# Patient Record
Sex: MALE | Race: WHITE | NOT HISPANIC OR LATINO | URBAN - METROPOLITAN AREA
[De-identification: names, ages, dates, MRNs, and addresses within clinical notes are randomized per-mention and may not be internally consistent; named-entity substitution may affect disease eponyms.]

---

## 2018-11-12 ENCOUNTER — OUTPATIENT (OUTPATIENT)
Dept: OUTPATIENT SERVICES | Facility: HOSPITAL | Age: 58
LOS: 1 days | Discharge: HOME | End: 2018-11-12

## 2018-11-12 DIAGNOSIS — I20.9 ANGINA PECTORIS, UNSPECIFIED: ICD-10-CM

## 2023-08-29 ENCOUNTER — EMERGENCY (EMERGENCY)
Facility: HOSPITAL | Age: 63
LOS: 0 days | Discharge: HOME | End: 2023-08-29
Attending: EMERGENCY MEDICINE
Payer: COMMERCIAL

## 2023-08-29 VITALS
RESPIRATION RATE: 20 BRPM | SYSTOLIC BLOOD PRESSURE: 120 MMHG | OXYGEN SATURATION: 99 % | TEMPERATURE: 99 F | HEART RATE: 79 BPM | DIASTOLIC BLOOD PRESSURE: 62 MMHG

## 2023-08-29 VITALS
TEMPERATURE: 98 F | RESPIRATION RATE: 20 BRPM | SYSTOLIC BLOOD PRESSURE: 124 MMHG | HEART RATE: 78 BPM | OXYGEN SATURATION: 99 % | DIASTOLIC BLOOD PRESSURE: 70 MMHG

## 2023-08-29 DIAGNOSIS — F17.210 NICOTINE DEPENDENCE, CIGARETTES, UNCOMPLICATED: ICD-10-CM

## 2023-08-29 DIAGNOSIS — R55 SYNCOPE AND COLLAPSE: ICD-10-CM

## 2023-08-29 DIAGNOSIS — I45.10 UNSPECIFIED RIGHT BUNDLE-BRANCH BLOCK: ICD-10-CM

## 2023-08-29 DIAGNOSIS — J44.9 CHRONIC OBSTRUCTIVE PULMONARY DISEASE, UNSPECIFIED: ICD-10-CM

## 2023-08-29 LAB
ALBUMIN SERPL ELPH-MCNC: 4.2 G/DL — SIGNIFICANT CHANGE UP (ref 3.5–5.2)
ALP SERPL-CCNC: 107 U/L — SIGNIFICANT CHANGE UP (ref 30–115)
ALT FLD-CCNC: 14 U/L — SIGNIFICANT CHANGE UP (ref 0–41)
ANION GAP SERPL CALC-SCNC: 14 MMOL/L — SIGNIFICANT CHANGE UP (ref 7–14)
APTT BLD: 33 SEC — SIGNIFICANT CHANGE UP (ref 27–39.2)
AST SERPL-CCNC: 29 U/L — SIGNIFICANT CHANGE UP (ref 0–41)
BASOPHILS # BLD AUTO: 0.04 K/UL — SIGNIFICANT CHANGE UP (ref 0–0.2)
BASOPHILS NFR BLD AUTO: 0.3 % — SIGNIFICANT CHANGE UP (ref 0–1)
BILIRUB SERPL-MCNC: 0.3 MG/DL — SIGNIFICANT CHANGE UP (ref 0.2–1.2)
BUN SERPL-MCNC: 22 MG/DL — HIGH (ref 10–20)
CALCIUM SERPL-MCNC: 9.8 MG/DL — SIGNIFICANT CHANGE UP (ref 8.4–10.5)
CHLORIDE SERPL-SCNC: 102 MMOL/L — SIGNIFICANT CHANGE UP (ref 98–110)
CO2 SERPL-SCNC: 22 MMOL/L — SIGNIFICANT CHANGE UP (ref 17–32)
CREAT SERPL-MCNC: 0.9 MG/DL — SIGNIFICANT CHANGE UP (ref 0.7–1.5)
EGFR: 96 ML/MIN/1.73M2 — SIGNIFICANT CHANGE UP
EOSINOPHIL # BLD AUTO: 0.07 K/UL — SIGNIFICANT CHANGE UP (ref 0–0.7)
EOSINOPHIL NFR BLD AUTO: 0.6 % — SIGNIFICANT CHANGE UP (ref 0–8)
GLUCOSE SERPL-MCNC: 89 MG/DL — SIGNIFICANT CHANGE UP (ref 70–99)
HCT VFR BLD CALC: 26.7 % — LOW (ref 42–52)
HGB BLD-MCNC: 8.4 G/DL — LOW (ref 14–18)
IMM GRANULOCYTES NFR BLD AUTO: 0.7 % — HIGH (ref 0.1–0.3)
INR BLD: 1.03 RATIO — SIGNIFICANT CHANGE UP (ref 0.65–1.3)
LYMPHOCYTES # BLD AUTO: 1 K/UL — LOW (ref 1.2–3.4)
LYMPHOCYTES # BLD AUTO: 8.1 % — LOW (ref 20.5–51.1)
MAGNESIUM SERPL-MCNC: 2 MG/DL — SIGNIFICANT CHANGE UP (ref 1.8–2.4)
MCHC RBC-ENTMCNC: 28.1 PG — SIGNIFICANT CHANGE UP (ref 27–31)
MCHC RBC-ENTMCNC: 31.5 G/DL — LOW (ref 32–37)
MCV RBC AUTO: 89.3 FL — SIGNIFICANT CHANGE UP (ref 80–94)
MONOCYTES # BLD AUTO: 0.58 K/UL — SIGNIFICANT CHANGE UP (ref 0.1–0.6)
MONOCYTES NFR BLD AUTO: 4.7 % — SIGNIFICANT CHANGE UP (ref 1.7–9.3)
NEUTROPHILS # BLD AUTO: 10.57 K/UL — HIGH (ref 1.4–6.5)
NEUTROPHILS NFR BLD AUTO: 85.6 % — HIGH (ref 42.2–75.2)
NRBC # BLD: 0 /100 WBCS — SIGNIFICANT CHANGE UP (ref 0–0)
PHOSPHATE SERPL-MCNC: 3 MG/DL — SIGNIFICANT CHANGE UP (ref 2.1–4.9)
PLATELET # BLD AUTO: 445 K/UL — HIGH (ref 130–400)
PMV BLD: 9.8 FL — SIGNIFICANT CHANGE UP (ref 7.4–10.4)
POTASSIUM SERPL-MCNC: 4.7 MMOL/L — SIGNIFICANT CHANGE UP (ref 3.5–5)
POTASSIUM SERPL-SCNC: 4.7 MMOL/L — SIGNIFICANT CHANGE UP (ref 3.5–5)
PROT SERPL-MCNC: 7.4 G/DL — SIGNIFICANT CHANGE UP (ref 6–8)
PROTHROM AB SERPL-ACNC: 11.7 SEC — SIGNIFICANT CHANGE UP (ref 9.95–12.87)
RBC # BLD: 2.99 M/UL — LOW (ref 4.7–6.1)
RBC # FLD: 14.9 % — HIGH (ref 11.5–14.5)
SODIUM SERPL-SCNC: 138 MMOL/L — SIGNIFICANT CHANGE UP (ref 135–146)
WBC # BLD: 12.35 K/UL — HIGH (ref 4.8–10.8)
WBC # FLD AUTO: 12.35 K/UL — HIGH (ref 4.8–10.8)

## 2023-08-29 PROCEDURE — 80053 COMPREHEN METABOLIC PANEL: CPT

## 2023-08-29 PROCEDURE — 93308 TTE F-UP OR LMTD: CPT

## 2023-08-29 PROCEDURE — 99285 EMERGENCY DEPT VISIT HI MDM: CPT | Mod: 25

## 2023-08-29 PROCEDURE — 93010 ELECTROCARDIOGRAM REPORT: CPT

## 2023-08-29 PROCEDURE — 36415 COLL VENOUS BLD VENIPUNCTURE: CPT

## 2023-08-29 PROCEDURE — 71046 X-RAY EXAM CHEST 2 VIEWS: CPT

## 2023-08-29 PROCEDURE — 99285 EMERGENCY DEPT VISIT HI MDM: CPT

## 2023-08-29 PROCEDURE — 93005 ELECTROCARDIOGRAM TRACING: CPT

## 2023-08-29 PROCEDURE — 85610 PROTHROMBIN TIME: CPT

## 2023-08-29 PROCEDURE — 71046 X-RAY EXAM CHEST 2 VIEWS: CPT | Mod: 26

## 2023-08-29 PROCEDURE — 85730 THROMBOPLASTIN TIME PARTIAL: CPT

## 2023-08-29 PROCEDURE — 93308 TTE F-UP OR LMTD: CPT | Mod: 26

## 2023-08-29 PROCEDURE — 83735 ASSAY OF MAGNESIUM: CPT

## 2023-08-29 PROCEDURE — 85025 COMPLETE CBC W/AUTO DIFF WBC: CPT

## 2023-08-29 PROCEDURE — 84100 ASSAY OF PHOSPHORUS: CPT

## 2023-08-29 RX ORDER — SODIUM CHLORIDE 9 MG/ML
1000 INJECTION INTRAMUSCULAR; INTRAVENOUS; SUBCUTANEOUS ONCE
Refills: 0 | Status: COMPLETED | OUTPATIENT
Start: 2023-08-29 | End: 2023-08-29

## 2023-08-29 RX ADMIN — SODIUM CHLORIDE 2000 MILLILITER(S): 9 INJECTION INTRAMUSCULAR; INTRAVENOUS; SUBCUTANEOUS at 15:08

## 2023-08-29 NOTE — ED PROVIDER NOTE - PATIENT PORTAL LINK FT
You can access the FollowMyHealth Patient Portal offered by [facility] by registering at the following website: [email or web address]. By joining [employer]’s FollowMyHealth portal, you will also be able to view your health information using other applications (apps) compatible with our system.

## 2023-08-29 NOTE — ED ADULT NURSE NOTE - NSFALLUNIVINTERV_ED_ALL_ED
Bed/Stretcher in lowest position, wheels locked, appropriate side rails in place/Call bell, personal items and telephone in reach/Instruct patient to call for assistance before getting out of bed/chair/stretcher/Non-slip footwear applied when patient is off stretcher/[place] to call system/Physically safe environment - no spills, clutter or unnecessary equipment/Purposeful proactive rounding/Room/bathroom lighting operational, light cord in reach

## 2023-08-29 NOTE — ED PROVIDER NOTE - NSFOLLOWUPINSTRUCTIONS_ED_ALL_ED_FT
Please follow-up with PCP in 1 to 3 days. Return precautions explained in full to patient/family.    Syncope    Syncope is when you temporarily lose consciousness, also called fainting or passing out. It is caused by a sudden decrease in blood flow to the brain. Even though most causes of syncope are not dangerous, syncope can possibly be a sign of a serious medical problem. Signs that you may be about to faint include feeling dizzy, lightheaded, nausea, visual changes, or cold/clammy skin. Do not drive, operate heavy machinery, or play sports until your health care provider says it is okay.    SEEK IMMEDIATE MEDICAL CARE IF YOU HAVE ANY OF THE FOLLOWING SYMPTOMS: severe headache, pain in your chest/abdomen/back, bleeding from your mouth or rectum, palpitations, shortness of breath, pain with breathing, seizure, confusion, or trouble walking. never used

## 2023-08-29 NOTE — ED PROVIDER NOTE - CLINICAL SUMMARY MEDICAL DECISION MAKING FREE TEXT BOX
Labs and imaging reassuring.  VSS.  Patient prefers to follow up as an outpatient.  Strict return instructions discussed.

## 2023-08-29 NOTE — ED PROVIDER NOTE - PHYSICAL EXAMINATION
CONSTITUTIONAL: NAD  SKIN: Warm dry  HEAD: NCAT  EYES: NL inspection, normal conjunctivae  ENT: MMM  NECK: Supple; non tender.  CARD: RRR  RESP: CTAB  ABD: S/NT no R/G  EXT: no pedal edema  NEURO: Grossly unremarkable  PSYCH: Cooperative, appropriate.

## 2023-08-29 NOTE — ED PROVIDER NOTE - OBJECTIVE STATEMENT
63-year-old male past medical history of 80-pack-year smoking and recently diagnosed throat cancer which is impinging on his left IJ, chronic iron anemia, COPD coming in with complaints of syncope.  Patient reports that he was at his iron infusion clinic and was about to receive an infusion when which he syncopized for couple minutes.  Describes some prodromal symptoms of cold nausea.  Follows with Dr. العلي at [facility] as ENT, and Dr. Aviles at his hematologist.

## 2023-08-31 ENCOUNTER — EMERGENCY (EMERGENCY)
Facility: HOSPITAL | Age: 63
LOS: 0 days | Discharge: HOME | End: 2023-08-31
Attending: STUDENT IN AN ORGANIZED HEALTH CARE EDUCATION/TRAINING PROGRAM
Payer: COMMERCIAL

## 2023-08-31 VITALS
DIASTOLIC BLOOD PRESSURE: 83 MMHG | RESPIRATION RATE: 16 BRPM | HEART RATE: 99 BPM | WEIGHT: 151.02 LBS | TEMPERATURE: 100 F | SYSTOLIC BLOOD PRESSURE: 183 MMHG | OXYGEN SATURATION: 99 %

## 2023-08-31 VITALS
DIASTOLIC BLOOD PRESSURE: 70 MMHG | RESPIRATION RATE: 18 BRPM | SYSTOLIC BLOOD PRESSURE: 147 MMHG | TEMPERATURE: 100 F | OXYGEN SATURATION: 100 % | HEART RATE: 82 BPM

## 2023-08-31 DIAGNOSIS — Z86.2 PERSONAL HISTORY OF DISEASES OF THE BLOOD AND BLOOD-FORMING ORGANS AND CERTAIN DISORDERS INVOLVING THE IMMUNE MECHANISM: ICD-10-CM

## 2023-08-31 DIAGNOSIS — R07.2 PRECORDIAL PAIN: ICD-10-CM

## 2023-08-31 DIAGNOSIS — Z85.850 PERSONAL HISTORY OF MALIGNANT NEOPLASM OF THYROID: ICD-10-CM

## 2023-08-31 DIAGNOSIS — F17.210 NICOTINE DEPENDENCE, CIGARETTES, UNCOMPLICATED: ICD-10-CM

## 2023-08-31 DIAGNOSIS — C14.0 MALIGNANT NEOPLASM OF PHARYNX, UNSPECIFIED: ICD-10-CM

## 2023-08-31 DIAGNOSIS — I10 ESSENTIAL (PRIMARY) HYPERTENSION: ICD-10-CM

## 2023-08-31 DIAGNOSIS — J44.9 CHRONIC OBSTRUCTIVE PULMONARY DISEASE, UNSPECIFIED: ICD-10-CM

## 2023-08-31 LAB
ALBUMIN SERPL ELPH-MCNC: 4.1 G/DL — SIGNIFICANT CHANGE UP (ref 3.5–5.2)
ALP SERPL-CCNC: 94 U/L — SIGNIFICANT CHANGE UP (ref 30–115)
ALT FLD-CCNC: 13 U/L — SIGNIFICANT CHANGE UP (ref 0–41)
ANION GAP SERPL CALC-SCNC: 13 MMOL/L — SIGNIFICANT CHANGE UP (ref 7–14)
AST SERPL-CCNC: 17 U/L — SIGNIFICANT CHANGE UP (ref 0–41)
BASOPHILS # BLD AUTO: 0.04 K/UL — SIGNIFICANT CHANGE UP (ref 0–0.2)
BASOPHILS NFR BLD AUTO: 0.3 % — SIGNIFICANT CHANGE UP (ref 0–1)
BILIRUB SERPL-MCNC: 0.2 MG/DL — SIGNIFICANT CHANGE UP (ref 0.2–1.2)
BUN SERPL-MCNC: 20 MG/DL — SIGNIFICANT CHANGE UP (ref 10–20)
CALCIUM SERPL-MCNC: 9.5 MG/DL — SIGNIFICANT CHANGE UP (ref 8.4–10.4)
CHLORIDE SERPL-SCNC: 102 MMOL/L — SIGNIFICANT CHANGE UP (ref 98–110)
CO2 SERPL-SCNC: 23 MMOL/L — SIGNIFICANT CHANGE UP (ref 17–32)
CREAT SERPL-MCNC: 1 MG/DL — SIGNIFICANT CHANGE UP (ref 0.7–1.5)
EGFR: 85 ML/MIN/1.73M2 — SIGNIFICANT CHANGE UP
EOSINOPHIL # BLD AUTO: 0.06 K/UL — SIGNIFICANT CHANGE UP (ref 0–0.7)
EOSINOPHIL NFR BLD AUTO: 0.5 % — SIGNIFICANT CHANGE UP (ref 0–8)
GLUCOSE SERPL-MCNC: 89 MG/DL — SIGNIFICANT CHANGE UP (ref 70–99)
HCT VFR BLD CALC: 25.4 % — LOW (ref 42–52)
HGB BLD-MCNC: 8 G/DL — LOW (ref 14–18)
IMM GRANULOCYTES NFR BLD AUTO: 0.7 % — HIGH (ref 0.1–0.3)
LIDOCAIN IGE QN: 34 U/L — SIGNIFICANT CHANGE UP (ref 7–60)
LYMPHOCYTES # BLD AUTO: 1.35 K/UL — SIGNIFICANT CHANGE UP (ref 1.2–3.4)
LYMPHOCYTES # BLD AUTO: 11.8 % — LOW (ref 20.5–51.1)
MCHC RBC-ENTMCNC: 28.5 PG — SIGNIFICANT CHANGE UP (ref 27–31)
MCHC RBC-ENTMCNC: 31.5 G/DL — LOW (ref 32–37)
MCV RBC AUTO: 90.4 FL — SIGNIFICANT CHANGE UP (ref 80–94)
MONOCYTES # BLD AUTO: 0.91 K/UL — HIGH (ref 0.1–0.6)
MONOCYTES NFR BLD AUTO: 7.9 % — SIGNIFICANT CHANGE UP (ref 1.7–9.3)
NEUTROPHILS # BLD AUTO: 9.03 K/UL — HIGH (ref 1.4–6.5)
NEUTROPHILS NFR BLD AUTO: 78.8 % — HIGH (ref 42.2–75.2)
NRBC # BLD: 0 /100 WBCS — SIGNIFICANT CHANGE UP (ref 0–0)
NT-PROBNP SERPL-SCNC: 702 PG/ML — HIGH (ref 0–300)
PLATELET # BLD AUTO: 450 K/UL — HIGH (ref 130–400)
PMV BLD: 9.8 FL — SIGNIFICANT CHANGE UP (ref 7.4–10.4)
POTASSIUM SERPL-MCNC: 3.9 MMOL/L — SIGNIFICANT CHANGE UP (ref 3.5–5)
POTASSIUM SERPL-SCNC: 3.9 MMOL/L — SIGNIFICANT CHANGE UP (ref 3.5–5)
PROT SERPL-MCNC: 7.2 G/DL — SIGNIFICANT CHANGE UP (ref 6–8)
RBC # BLD: 2.81 M/UL — LOW (ref 4.7–6.1)
RBC # FLD: 14.9 % — HIGH (ref 11.5–14.5)
SODIUM SERPL-SCNC: 138 MMOL/L — SIGNIFICANT CHANGE UP (ref 135–146)
TROPONIN T SERPL-MCNC: <0.01 NG/ML — SIGNIFICANT CHANGE UP
WBC # BLD: 11.47 K/UL — HIGH (ref 4.8–10.8)
WBC # FLD AUTO: 11.47 K/UL — HIGH (ref 4.8–10.8)

## 2023-08-31 PROCEDURE — 71275 CT ANGIOGRAPHY CHEST: CPT | Mod: 26,MA

## 2023-08-31 PROCEDURE — 93010 ELECTROCARDIOGRAM REPORT: CPT

## 2023-08-31 PROCEDURE — 83880 ASSAY OF NATRIURETIC PEPTIDE: CPT

## 2023-08-31 PROCEDURE — 99285 EMERGENCY DEPT VISIT HI MDM: CPT | Mod: 25

## 2023-08-31 PROCEDURE — 71045 X-RAY EXAM CHEST 1 VIEW: CPT

## 2023-08-31 PROCEDURE — 71275 CT ANGIOGRAPHY CHEST: CPT | Mod: MA

## 2023-08-31 PROCEDURE — 36415 COLL VENOUS BLD VENIPUNCTURE: CPT

## 2023-08-31 PROCEDURE — 83690 ASSAY OF LIPASE: CPT

## 2023-08-31 PROCEDURE — 80053 COMPREHEN METABOLIC PANEL: CPT

## 2023-08-31 PROCEDURE — 99285 EMERGENCY DEPT VISIT HI MDM: CPT

## 2023-08-31 PROCEDURE — 71045 X-RAY EXAM CHEST 1 VIEW: CPT | Mod: 26

## 2023-08-31 PROCEDURE — 85025 COMPLETE CBC W/AUTO DIFF WBC: CPT

## 2023-08-31 PROCEDURE — 84484 ASSAY OF TROPONIN QUANT: CPT

## 2023-08-31 PROCEDURE — 96374 THER/PROPH/DIAG INJ IV PUSH: CPT | Mod: XU

## 2023-08-31 PROCEDURE — 93005 ELECTROCARDIOGRAM TRACING: CPT

## 2023-08-31 RX ORDER — KETOROLAC TROMETHAMINE 30 MG/ML
15 SYRINGE (ML) INJECTION ONCE
Refills: 0 | Status: DISCONTINUED | OUTPATIENT
Start: 2023-08-31 | End: 2023-08-31

## 2023-08-31 RX ADMIN — Medication 15 MILLIGRAM(S): at 16:00

## 2023-08-31 NOTE — ED ADULT NURSE NOTE - NSFALLUNIVINTERV_ED_ALL_ED
Assistance with ambulation/Monitor for mental status changes and reorient to person, place, and time, as needed/Reinforce activity limits and safety measures with patient and family/Use of alarms - bed, stretcher, chair and/or video monitoring/Bed/Stretcher in lowest position, wheels locked, appropriate side rails in place/Call bell, personal items and telephone in reach/Instruct patient to call for assistance before getting out of bed/chair/stretcher/Non-slip footwear applied when patient is off stretcher/[place] to call system/Physically safe environment - no spills, clutter or unnecessary equipment/Purposeful proactive rounding/Room/bathroom lighting operational, light cord in reach

## 2023-08-31 NOTE — ED PROVIDER NOTE - PHYSICAL EXAMINATION
Initial vital signs reviewed.  General: NAD, nontoxic appearing.  HENT: AT/NC.  Eyes: non-injected conjunctivae b/l.  Neck: supple.  CV: RRR, no murmurs.  Pulm: nonlabored work of breathing, CTAB. No chest wall or rib tenderness to palpation.  Abd: soft, nondistended, nontender.  MSK: no joint deformity. No pedal edema b/l.  Skin: warm, dry, well-perfused.  Neuro: A&Ox4.  Psych: appropriate mood and affect. Initial vital signs reviewed.  General: NAD, nontoxic appearing.  HENT: AT/NC.  Eyes: non-injected conjunctivae b/l.  Neck: supple.  CV: RRR, no murmurs. 2+ radial and DP pulses b/l.  Pulm: nonlabored work of breathing, CTAB. No chest wall or rib tenderness to palpation.  Abd: soft, nondistended, nontender.  MSK: no joint deformity. No pedal edema b/l.  Skin: warm, dry, well-perfused.  Neuro: A&Ox4.  Psych: appropriate mood and affect.

## 2023-08-31 NOTE — ED PROVIDER NOTE - OBJECTIVE STATEMENT
64 yo male with PMHx of 80-pack-year smoking and recently diagnosed throat cancer impinging on his left IJ, chronic iron anemia, COPD, who was recently seen here 2 days ago for syncopal workup, returns after being referred by PCP for pleuritic chest pain and r/o PE. Pt explains since discharge 2 days ago, his pain has been increasing, sharp, and more prominent with shallower breaths. He locates his pain primarily to superior b/l chest and substernal. Denies any dyspnea, hemoptysis, leg swelling, fever, chills, dizziness, lightheadedness, N/V.  Smokes 2ppd. No recent travels, surgeries, or extended periods of immobilization.

## 2023-08-31 NOTE — ED PROVIDER NOTE - PATIENT PORTAL LINK FT
You can access the FollowMyHealth Patient Portal offered by [facility] by registering at the following website: [email or web address]. By joining [employer]’s FollowMyHealth portal, you will also be able to view your health information using other applications (apps) compatible with our system.

## 2023-08-31 NOTE — ED PROVIDER NOTE - NSFOLLOWUPINSTRUCTIONS_ED_ALL_ED_FT
Please follow up with your PCP in 3-4 days.    Chest Pain    You were seen and evaluated for chest pain. After a work up in the Emergency Department, it was determined that it is safe for you to be discharged with close follow up. Please monitor your symptoms and bring all of your results to follow up with your doctor. Please call for follow up with the heart doctor. As discussed, you may require further work up and testing for your chest pain.    WHAT YOU NEED TO KNOW:  Chest pain can be caused by a range of conditions, from not serious to life-threatening. Chest pain can be a symptom of a digestive problem, such as acid reflux or a stomach ulcer. An anxiety attack or a strong emotion, such as anger, can also cause chest pain. Infection, inflammation, or a fracture in the bones or cartilage in your chest can cause pain or discomfort. Sometimes chest pain or pressure is caused by poor blood flow to your heart (angina). Chest pain may also be caused by life-threatening conditions such as a heart attack or blood clot in your lungs.      DISCHARGE INSTRUCTIONS:  Call your local emergency number (911 in the [place]) or have someone call if:  You have any of the following signs of a heart attack:  Squeezing, pressure, or pain in your chest  You may also have any of the following:  Discomfort or pain in your back, neck, jaw, stomach, or arm  Shortness of breath  Nausea or vomiting  Lightheadedness or a sudden cold sweat  Return to the emergency department if:  You have chest discomfort that gets worse, even with medicine.  You cough or vomit blood.  Your bowel movements are black or bloody.  You cannot stop vomiting, or it hurts to swallow.  Call your doctor if:  You have questions or concerns about your condition or care.  Medicines:  Medicines may be given to treat the cause of your chest pain. Examples include pain medicine, anxiety medicine, or medicines to increase blood flow to your heart.  Do not take certain medicines without asking your healthcare provider first. These include NSAIDs, herbal or vitamin supplements, or hormones (estrogen or progestin).  Take your medicine as directed. Contact your healthcare provider if you think your medicine is not helping or if you have side effects. Tell him or her if you are allergic to any medicine. Keep a list of the medicines, vitamins, and herbs you take. Include the amounts, and when and why you take them. Bring the list or the pill bottles to follow-up visits. Carry your medicine list with you in case of an emergency.    Healthy living tips:  The following are general healthy guidelines. If the cause of your chest pain is known, your healthcare provider will give you specific guidelines to follow.    Do not smoke. Nicotine and other chemicals in cigarettes and cigars can cause lung and heart damage. Ask your healthcare provider for information if you currently smoke and need help to quit. E-cigarettes or smokeless tobacco still contain nicotine. Talk to your healthcare provider before you use these products.  Choose a variety of healthy foods as often as possible. Include fresh, frozen, or canned fruits and vegetables. Also include low-fat dairy products, fish, chicken (without skin), and lean meats. Your healthcare provider or a dietitian can help you create meal plans. You may need to avoid certain foods or drinks if your pain is caused by a digestion problem.  Healthy Foods  Lower your sodium (salt) intake. Limit foods that are high in sodium, such as canned foods, salty snacks, and cold cuts. If you add salt when you cook food, do not add more at the table. Choose low-sodium canned foods as much as possible.    Drink plenty of water every day. Water helps your body to control your temperature and blood pressure. Ask your healthcare provider how much water you should drink every day.  Ask about activity. Your healthcare provider will tell you which activities to limit or avoid. Ask when you can drive, return to work, and have sex. Ask about the best exercise plan for you.  Maintain a healthy weight. Ask your healthcare provider what a healthy weight is for you. Ask him or her to help you create a safe weight loss plan if you are overweight.    Follow up with your healthcare provider within 72 hours, or as directed:  You may need to return for more tests to find the cause of your chest pain. You may be referred to a specialist, such as a cardiologist or gastroenterologist. Write down your questions so you remember to ask them during your visits.

## 2023-08-31 NOTE — ED PROVIDER NOTE - CLINICAL SUMMARY MEDICAL DECISION MAKING FREE TEXT BOX
NSR rate 91, normal axis, no ST/T wave changes, similar to EKG 8/29/2023. 62 yo male, PMHx of throat cancer diagnosed 2 weeks ago, COPD, HTN, presenting with bilateral chest pain x2 days.   Labs and EKG were ordered and reviewed.  Imaging was ordered and reviewed by me.  Appropriate medications for patient's presenting complaints were ordered and effects were reassessed.  Patient has been clinically stable throughout ED visit. discussed all results with patient. Recommend outpatient follow up with oncologist. Patient agreeable to plan. Given strict return precautions.

## 2023-08-31 NOTE — ED PROVIDER NOTE - ATTENDING CONTRIBUTION TO CARE
62 yo male, PMHx of throat cancer diagnosed 2 weeks ago, COPD, HTN, presenting with bilateral chest pain x2 days, intermittent, worse with deep inspiration, no alleviating factors, no associated symptoms. Denies nausea, vomiting, abdominal pain, shortness of breath, leg swelling.

## 2024-05-24 ENCOUNTER — INPATIENT (INPATIENT)
Facility: HOSPITAL | Age: 64
LOS: 6 days | Discharge: HOME | DRG: 897 | End: 2024-05-31
Attending: STUDENT IN AN ORGANIZED HEALTH CARE EDUCATION/TRAINING PROGRAM | Admitting: FAMILY MEDICINE
Payer: COMMERCIAL

## 2024-05-24 VITALS
OXYGEN SATURATION: 99 % | HEART RATE: 73 BPM | SYSTOLIC BLOOD PRESSURE: 144 MMHG | WEIGHT: 134.92 LBS | TEMPERATURE: 98 F | HEIGHT: 68 IN | DIASTOLIC BLOOD PRESSURE: 76 MMHG | RESPIRATION RATE: 18 BRPM

## 2024-05-24 DIAGNOSIS — K29.70 GASTRITIS, UNSPECIFIED, WITHOUT BLEEDING: ICD-10-CM

## 2024-05-24 DIAGNOSIS — E87.1 HYPO-OSMOLALITY AND HYPONATREMIA: ICD-10-CM

## 2024-05-24 DIAGNOSIS — N17.9 ACUTE KIDNEY FAILURE, UNSPECIFIED: ICD-10-CM

## 2024-05-24 DIAGNOSIS — K76.0 FATTY (CHANGE OF) LIVER, NOT ELSEWHERE CLASSIFIED: ICD-10-CM

## 2024-05-24 DIAGNOSIS — D50.9 IRON DEFICIENCY ANEMIA, UNSPECIFIED: ICD-10-CM

## 2024-05-24 DIAGNOSIS — F10.939 ALCOHOL USE, UNSPECIFIED WITH WITHDRAWAL, UNSPECIFIED: ICD-10-CM

## 2024-05-24 DIAGNOSIS — E51.9 THIAMINE DEFICIENCY, UNSPECIFIED: ICD-10-CM

## 2024-05-24 DIAGNOSIS — E83.42 HYPOMAGNESEMIA: ICD-10-CM

## 2024-05-24 DIAGNOSIS — T40.2X5A ADVERSE EFFECT OF OTHER OPIOIDS, INITIAL ENCOUNTER: ICD-10-CM

## 2024-05-24 DIAGNOSIS — Z85.89 PERSONAL HISTORY OF MALIGNANT NEOPLASM OF OTHER ORGANS AND SYSTEMS: ICD-10-CM

## 2024-05-24 DIAGNOSIS — K57.30 DIVERTICULOSIS OF LARGE INTESTINE WITHOUT PERFORATION OR ABSCESS WITHOUT BLEEDING: ICD-10-CM

## 2024-05-24 DIAGNOSIS — E88.89 OTHER SPECIFIED METABOLIC DISORDERS: ICD-10-CM

## 2024-05-24 DIAGNOSIS — K92.1 MELENA: ICD-10-CM

## 2024-05-24 DIAGNOSIS — R74.01 ELEVATION OF LEVELS OF LIVER TRANSAMINASE LEVELS: ICD-10-CM

## 2024-05-24 DIAGNOSIS — K64.0 FIRST DEGREE HEMORRHOIDS: ICD-10-CM

## 2024-05-24 DIAGNOSIS — K59.03 DRUG INDUCED CONSTIPATION: ICD-10-CM

## 2024-05-24 DIAGNOSIS — J44.9 CHRONIC OBSTRUCTIVE PULMONARY DISEASE, UNSPECIFIED: ICD-10-CM

## 2024-05-24 DIAGNOSIS — R10.9 UNSPECIFIED ABDOMINAL PAIN: ICD-10-CM

## 2024-05-24 DIAGNOSIS — K55.20 ANGIODYSPLASIA OF COLON WITHOUT HEMORRHAGE: ICD-10-CM

## 2024-05-24 DIAGNOSIS — F10.929 ALCOHOL USE, UNSPECIFIED WITH INTOXICATION, UNSPECIFIED: ICD-10-CM

## 2024-05-24 DIAGNOSIS — F17.200 NICOTINE DEPENDENCE, UNSPECIFIED, UNCOMPLICATED: ICD-10-CM

## 2024-05-24 DIAGNOSIS — J38.4 EDEMA OF LARYNX: ICD-10-CM

## 2024-05-24 DIAGNOSIS — E87.29 OTHER ACIDOSIS: ICD-10-CM

## 2024-05-24 DIAGNOSIS — D52.9 FOLATE DEFICIENCY ANEMIA, UNSPECIFIED: ICD-10-CM

## 2024-05-24 PROBLEM — I10 ESSENTIAL (PRIMARY) HYPERTENSION: Chronic | Status: ACTIVE | Noted: 2023-08-31

## 2024-05-24 PROBLEM — C14.0 MALIGNANT NEOPLASM OF PHARYNX, UNSPECIFIED: Chronic | Status: ACTIVE | Noted: 2023-08-31

## 2024-05-24 LAB
ALBUMIN SERPL ELPH-MCNC: 4.6 G/DL — SIGNIFICANT CHANGE UP (ref 3.5–5.2)
ALP SERPL-CCNC: 115 U/L — SIGNIFICANT CHANGE UP (ref 30–115)
ALT FLD-CCNC: 21 U/L — SIGNIFICANT CHANGE UP (ref 0–41)
ANION GAP SERPL CALC-SCNC: 28 MMOL/L — HIGH (ref 7–14)
ANISOCYTOSIS BLD QL: SLIGHT — SIGNIFICANT CHANGE UP
AST SERPL-CCNC: 30 U/L — SIGNIFICANT CHANGE UP (ref 0–41)
BASE EXCESS BLDV CALC-SCNC: -7.3 MMOL/L — LOW (ref -2–3)
BASOPHILS # BLD AUTO: 0 K/UL — SIGNIFICANT CHANGE UP (ref 0–0.2)
BASOPHILS NFR BLD AUTO: 0 % — SIGNIFICANT CHANGE UP (ref 0–1)
BILIRUB SERPL-MCNC: 0.6 MG/DL — SIGNIFICANT CHANGE UP (ref 0.2–1.2)
BUN SERPL-MCNC: 49 MG/DL — HIGH (ref 10–20)
CA-I SERPL-SCNC: 1.24 MMOL/L — SIGNIFICANT CHANGE UP (ref 1.15–1.33)
CALCIUM SERPL-MCNC: 9.8 MG/DL — SIGNIFICANT CHANGE UP (ref 8.4–10.4)
CHLORIDE SERPL-SCNC: 88 MMOL/L — LOW (ref 98–110)
CO2 SERPL-SCNC: 16 MMOL/L — LOW (ref 17–32)
CREAT SERPL-MCNC: 1.5 MG/DL — SIGNIFICANT CHANGE UP (ref 0.7–1.5)
EGFR: 52 ML/MIN/1.73M2 — LOW
EOSINOPHIL # BLD AUTO: 0 K/UL — SIGNIFICANT CHANGE UP (ref 0–0.7)
EOSINOPHIL NFR BLD AUTO: 0 % — SIGNIFICANT CHANGE UP (ref 0–8)
GAS PNL BLDV: 130 MMOL/L — LOW (ref 136–145)
GAS PNL BLDV: SIGNIFICANT CHANGE UP
GAS PNL BLDV: SIGNIFICANT CHANGE UP
GLUCOSE SERPL-MCNC: 59 MG/DL — LOW (ref 70–99)
HCO3 BLDV-SCNC: 18 MMOL/L — LOW (ref 22–29)
HCT VFR BLD CALC: 28.4 % — LOW (ref 42–52)
HCT VFR BLDA CALC: 26 % — LOW (ref 39–51)
HGB BLD CALC-MCNC: 8.8 G/DL — LOW (ref 12.6–17.4)
HGB BLD-MCNC: 10.3 G/DL — LOW (ref 14–18)
HYPOCHROMIA BLD QL: SLIGHT — SIGNIFICANT CHANGE UP
LACTATE BLDV-MCNC: 3.1 MMOL/L — HIGH (ref 0.5–2)
LIDOCAIN IGE QN: 109 U/L — HIGH (ref 7–60)
LYMPHOCYTES # BLD AUTO: 0.17 K/UL — LOW (ref 1.2–3.4)
LYMPHOCYTES # BLD AUTO: 2.7 % — LOW (ref 20.5–51.1)
MACROCYTES BLD QL: SLIGHT — SIGNIFICANT CHANGE UP
MAGNESIUM SERPL-MCNC: 2.2 MG/DL — SIGNIFICANT CHANGE UP (ref 1.8–2.4)
MANUAL SMEAR VERIFICATION: SIGNIFICANT CHANGE UP
MCHC RBC-ENTMCNC: 36.3 G/DL — SIGNIFICANT CHANGE UP (ref 32–37)
MCHC RBC-ENTMCNC: 36.9 PG — HIGH (ref 27–31)
MCV RBC AUTO: 101.8 FL — HIGH (ref 80–94)
METAMYELOCYTES # FLD: 0.9 % — HIGH (ref 0–0)
MICROCYTES BLD QL: SLIGHT — SIGNIFICANT CHANGE UP
MONOCYTES # BLD AUTO: 0.22 K/UL — SIGNIFICANT CHANGE UP (ref 0.1–0.6)
MONOCYTES NFR BLD AUTO: 3.5 % — SIGNIFICANT CHANGE UP (ref 1.7–9.3)
NEUTROPHILS # BLD AUTO: 5.44 K/UL — SIGNIFICANT CHANGE UP (ref 1.4–6.5)
NEUTROPHILS NFR BLD AUTO: 87.6 % — HIGH (ref 42.2–75.2)
NEUTS BAND # BLD: 0.9 % — SIGNIFICANT CHANGE UP (ref 0–6)
OSMOLALITY SERPL: 327 MOS/KG — HIGH (ref 280–301)
OVALOCYTES BLD QL SMEAR: SLIGHT — SIGNIFICANT CHANGE UP
PCO2 BLDV: 34 MMHG — LOW (ref 42–55)
PH BLDV: 7.33 — SIGNIFICANT CHANGE UP (ref 7.32–7.43)
PLAT MORPH BLD: ABNORMAL
PLATELET # BLD AUTO: 212 K/UL — SIGNIFICANT CHANGE UP (ref 130–400)
PMV BLD: 9.3 FL — SIGNIFICANT CHANGE UP (ref 7.4–10.4)
PO2 BLDV: 43 MMHG — SIGNIFICANT CHANGE UP (ref 25–45)
POIKILOCYTOSIS BLD QL AUTO: SLIGHT — SIGNIFICANT CHANGE UP
POLYCHROMASIA BLD QL SMEAR: SLIGHT — SIGNIFICANT CHANGE UP
POTASSIUM BLDV-SCNC: 4.5 MMOL/L — SIGNIFICANT CHANGE UP (ref 3.5–5.1)
POTASSIUM SERPL-MCNC: 4.6 MMOL/L — SIGNIFICANT CHANGE UP (ref 3.5–5)
POTASSIUM SERPL-SCNC: 4.6 MMOL/L — SIGNIFICANT CHANGE UP (ref 3.5–5)
PROT SERPL-MCNC: 7 G/DL — SIGNIFICANT CHANGE UP (ref 6–8)
RBC # BLD: 2.79 M/UL — LOW (ref 4.7–6.1)
RBC # FLD: 14.3 % — SIGNIFICANT CHANGE UP (ref 11.5–14.5)
RBC BLD AUTO: ABNORMAL
SAO2 % BLDV: 69.7 % — SIGNIFICANT CHANGE UP (ref 67–88)
SODIUM SERPL-SCNC: 132 MMOL/L — LOW (ref 135–146)
STOMATOCYTES BLD QL SMEAR: SLIGHT — SIGNIFICANT CHANGE UP
VARIANT LYMPHS # BLD: 4.4 % — SIGNIFICANT CHANGE UP (ref 0–5)
WBC # BLD: 6.15 K/UL — SIGNIFICANT CHANGE UP (ref 4.8–10.8)
WBC # FLD AUTO: 6.15 K/UL — SIGNIFICANT CHANGE UP (ref 4.8–10.8)

## 2024-05-24 PROCEDURE — 82010 KETONE BODYS QUAN: CPT

## 2024-05-24 PROCEDURE — 86850 RBC ANTIBODY SCREEN: CPT

## 2024-05-24 PROCEDURE — 81003 URINALYSIS AUTO W/O SCOPE: CPT

## 2024-05-24 PROCEDURE — 80053 COMPREHEN METABOLIC PANEL: CPT

## 2024-05-24 PROCEDURE — 85610 PROTHROMBIN TIME: CPT

## 2024-05-24 PROCEDURE — 83550 IRON BINDING TEST: CPT

## 2024-05-24 PROCEDURE — 36430 TRANSFUSION BLD/BLD COMPNT: CPT

## 2024-05-24 PROCEDURE — 86901 BLOOD TYPING SEROLOGIC RH(D): CPT

## 2024-05-24 PROCEDURE — 74177 CT ABD & PELVIS W/CONTRAST: CPT | Mod: 26,MC

## 2024-05-24 PROCEDURE — 70491 CT SOFT TISSUE NECK W/DYE: CPT | Mod: MC

## 2024-05-24 PROCEDURE — 80074 ACUTE HEPATITIS PANEL: CPT

## 2024-05-24 PROCEDURE — 84540 ASSAY OF URINE/UREA-N: CPT

## 2024-05-24 PROCEDURE — 80307 DRUG TEST PRSMV CHEM ANLYZR: CPT

## 2024-05-24 PROCEDURE — 83735 ASSAY OF MAGNESIUM: CPT

## 2024-05-24 PROCEDURE — 36415 COLL VENOUS BLD VENIPUNCTURE: CPT

## 2024-05-24 PROCEDURE — 85027 COMPLETE CBC AUTOMATED: CPT

## 2024-05-24 PROCEDURE — P9040: CPT

## 2024-05-24 PROCEDURE — 84300 ASSAY OF URINE SODIUM: CPT

## 2024-05-24 PROCEDURE — 97116 GAIT TRAINING THERAPY: CPT | Mod: GP

## 2024-05-24 PROCEDURE — 85025 COMPLETE CBC W/AUTO DIFF WBC: CPT

## 2024-05-24 PROCEDURE — 99221 1ST HOSP IP/OBS SF/LOW 40: CPT | Mod: 25

## 2024-05-24 PROCEDURE — 83935 ASSAY OF URINE OSMOLALITY: CPT

## 2024-05-24 PROCEDURE — 84100 ASSAY OF PHOSPHORUS: CPT

## 2024-05-24 PROCEDURE — 88305 TISSUE EXAM BY PATHOLOGIST: CPT

## 2024-05-24 PROCEDURE — 85730 THROMBOPLASTIN TIME PARTIAL: CPT

## 2024-05-24 PROCEDURE — 97530 THERAPEUTIC ACTIVITIES: CPT | Mod: GP

## 2024-05-24 PROCEDURE — 82570 ASSAY OF URINE CREATININE: CPT

## 2024-05-24 PROCEDURE — 83540 ASSAY OF IRON: CPT

## 2024-05-24 PROCEDURE — 88312 SPECIAL STAINS GROUP 1: CPT

## 2024-05-24 PROCEDURE — 86900 BLOOD TYPING SEROLOGIC ABO: CPT

## 2024-05-24 PROCEDURE — 82746 ASSAY OF FOLIC ACID SERUM: CPT

## 2024-05-24 PROCEDURE — 71045 X-RAY EXAM CHEST 1 VIEW: CPT | Mod: 26

## 2024-05-24 PROCEDURE — 31575 DIAGNOSTIC LARYNGOSCOPY: CPT | Mod: 52

## 2024-05-24 PROCEDURE — 97162 PT EVAL MOD COMPLEX 30 MIN: CPT | Mod: GP

## 2024-05-24 PROCEDURE — 82607 VITAMIN B-12: CPT

## 2024-05-24 PROCEDURE — 99285 EMERGENCY DEPT VISIT HI MDM: CPT

## 2024-05-24 PROCEDURE — 86923 COMPATIBILITY TEST ELECTRIC: CPT

## 2024-05-24 PROCEDURE — 83605 ASSAY OF LACTIC ACID: CPT

## 2024-05-24 PROCEDURE — 82728 ASSAY OF FERRITIN: CPT

## 2024-05-24 PROCEDURE — C9113: CPT

## 2024-05-24 RX ORDER — ONDANSETRON 8 MG/1
4 TABLET, FILM COATED ORAL ONCE
Refills: 0 | Status: COMPLETED | OUTPATIENT
Start: 2024-05-24 | End: 2024-05-24

## 2024-05-24 RX ORDER — SODIUM CHLORIDE 9 MG/ML
1000 INJECTION, SOLUTION INTRAVENOUS
Refills: 0 | Status: DISCONTINUED | OUTPATIENT
Start: 2024-05-24 | End: 2024-05-28

## 2024-05-24 RX ORDER — THIAMINE MONONITRATE (VIT B1) 100 MG
100 TABLET ORAL DAILY
Refills: 0 | Status: DISCONTINUED | OUTPATIENT
Start: 2024-05-24 | End: 2024-05-31

## 2024-05-24 RX ORDER — SODIUM CHLORIDE 9 MG/ML
1000 INJECTION, SOLUTION INTRAVENOUS
Refills: 0 | Status: DISCONTINUED | OUTPATIENT
Start: 2024-05-24 | End: 2024-05-25

## 2024-05-24 RX ORDER — SODIUM CHLORIDE 9 MG/ML
1000 INJECTION, SOLUTION INTRAVENOUS ONCE
Refills: 0 | Status: COMPLETED | OUTPATIENT
Start: 2024-05-24 | End: 2024-05-24

## 2024-05-24 RX ORDER — THIAMINE MONONITRATE (VIT B1) 100 MG
500 TABLET ORAL ONCE
Refills: 0 | Status: COMPLETED | OUTPATIENT
Start: 2024-05-24 | End: 2024-05-24

## 2024-05-24 RX ORDER — FAMOTIDINE 10 MG/ML
20 INJECTION INTRAVENOUS ONCE
Refills: 0 | Status: COMPLETED | OUTPATIENT
Start: 2024-05-24 | End: 2024-05-24

## 2024-05-24 RX ORDER — FOLIC ACID 0.8 MG
1 TABLET ORAL DAILY
Refills: 0 | Status: DISCONTINUED | OUTPATIENT
Start: 2024-05-24 | End: 2024-05-31

## 2024-05-24 RX ADMIN — SODIUM CHLORIDE 1000 MILLILITER(S): 9 INJECTION, SOLUTION INTRAVENOUS at 17:52

## 2024-05-24 RX ADMIN — FAMOTIDINE 20 MILLIGRAM(S): 10 INJECTION INTRAVENOUS at 20:49

## 2024-05-24 RX ADMIN — SODIUM CHLORIDE 1000 MILLILITER(S): 9 INJECTION, SOLUTION INTRAVENOUS at 21:54

## 2024-05-24 RX ADMIN — ONDANSETRON 4 MILLIGRAM(S): 8 TABLET, FILM COATED ORAL at 17:51

## 2024-05-24 RX ADMIN — Medication 105 MILLIGRAM(S): at 17:52

## 2024-05-24 NOTE — ED PROVIDER NOTE - ATTENDING CONTRIBUTION TO CARE
64-year-old man with history of COPD, alcohol use disorder, throat CA in remission, in ER requesting alcohol detox.  Pt states he's been drinking heavily for the past 10 days after being sober for 8 yrs last drink was ~ 4 hours PTA.  + N.  denies V or D.  + mild upper abdominal pain.  + decreased PO intake. no urinary symptoms. no f/c.  no cp/sob. no HA/dizziness/syncope.  No hallucinations, no tactile disturbances.  No tremors.  PE - nad, nc/at, eomi, perrl, op - clear, mmm, neck supple, cta b/l, no w/r/r, rrr, abd- soft, nd, + mild upper abdominal tenderness,  no guarding/rebound, nabs, from x 4, no tremors noted, no LE swelling/tenderness, A&O x 3, cn 2-12 intact, no focal motor/sensory deficits    -ivf, check labs, ct abd, re-eval.

## 2024-05-24 NOTE — ED PROVIDER NOTE - CLINICAL SUMMARY MEDICAL DECISION MAKING FREE TEXT BOX
64-year-old man with PMHx as noted, in ER requesting alcohol detox, has been drinking heavily for the past 10 days.  + Mild upper abdominal pain. Labs reviewed: WBC 6, Hgb 10, CMP with BG 59, anion gap 28, bicarb 16.  Lipase 109.  Mag 2.2.  pH 7.33, lactate 3.1.  CT abdomen: No acute pathology, + hepatic steatosis.  Patient states he is only been drinking bourbon, denies any toxic alcohol ingestion.  Patient given IVF, thiamine.  Repeat FS 93.  Case discussed with ICU fellow, stable for floor admission at this time.

## 2024-05-24 NOTE — H&P ADULT - HISTORY OF PRESENT ILLNESS
64-year-old male with history of alcohol use disorder, throat cancer in remission, COPD not on home O2, smoker ( 80 pack years) presents with acute alcohol intoxication. Pt wanted to go to rehab today but was told to come to ED instead. He admits his last drink was 4 hours prior to ED arrival. He endorses feeling nauseas.  Patient admits he has not had anything to eat for the last 10 days.  Patient denies any trauma falls numbness tingling weakness vomiting. Denies history of alcohol withdrawal seizure.     In ED vitals were  T(F): 97.8  HR: 76  BP: 136/86  RR: 18  SpO2: 97%    Labs were significant for Hgb 10.3, .8, Na+ 132, FS 59, Cl 88, Bicab 16, AG 28, BUN 49, Cr 1.5 ( baseline 1.0), lipase 109, serum osm 327,     VBG: pH 7.33, lactate 3.1, pCO2 34,     CTAP: Hepatic steatosis     EKG: NSR     Pt received 2 L IVF, IV thiamine, zofran, famotidine in ED. Pt is being admitted to medicine for further management.  64-year-old male with history of alcohol use disorder, throat cancer in remission, COPD not on home O2, active smoker ( 80 pack years) presents with acute alcohol intoxication. Pt states he has been drinking whiskey around the clock ( 4-6 glasses/ day) for the past 2-3 weeks without drinking any water or having any food. He states he was weaned off the morphine ( which was prescibed to him for throat pain) 2-3 weeks ago and since then he has been drinking alcohol to relieve pain. He today realized he is reaching to the point of no return and  wanted to go to rehab but was told to come to ED instead. He admits his last drink was 1 PM on 5/24/24. He endorses feeling nauseous.  Patient denies any trauma falls numbness tingling weakness vomiting. Denies history of alcohol withdrawal seizure. Denies any other drug use.     In ED vitals were  T(F): 97.8  HR: 76  BP: 136/86  RR: 18  SpO2: 97%    Labs were significant for Hgb 10.3, .8, Na+ 132, FS 59, Cl 88, Bicab 16, AG 28, BUN 49, Cr 1.5 ( baseline 1.0), lipase 109, serum osm 327,     VBG: pH 7.33, lactate 3.1, pCO2 34,     CTAP: Hepatic steatosis     EKG: NSR     Pt received 2 L IVF, IV thiamine, zofran, famotidine in ED. Pt is being admitted to medicine for further management.

## 2024-05-24 NOTE — ED PROVIDER NOTE - OBJECTIVE STATEMENT
64-year-old male with history of alcohol use disorder, throat cancer in remission, COPD, smoker presents with alcohol use disorder nausea.  Patient admits has for the last 10 days without eating anything.  Admits to mild nausea.  Admits wanted to go to rehab today but was told to come to the emergency room instead.  Patient denies any trauma falls numbness tingling weakness vomiting.  Admits last drink was 4 hours prior to ED arrival  Denies history of alcohol withdrawal seizure

## 2024-05-24 NOTE — H&P ADULT - NSHPLABSRESULTS_GEN_ALL_CORE
10.3   6.15  )-----------( 212      ( 24 May 2024 18:00 )             28.4     05-24    132<L>  |  88<L>  |  49<H>  ----------------------------<  59<L>  4.6   |  16<L>  |  1.5    Ca    9.8      24 May 2024 18:00  Mg     2.2     05-24    TPro  7.0  /  Alb  4.6  /  TBili  0.6  /  DBili  x   /  AST  30  /  ALT  21  /  AlkPhos  115  05-24      LIVER FUNCTIONS - ( 24 May 2024 18:00 )  Alb: 4.6 g/dL / Pro: 7.0 g/dL / ALK PHOS: 115 U/L / ALT: 21 U/L / AST: 30 U/L / GGT: x           Urinalysis Basic - ( 24 May 2024 18:00 )    Color: x / Appearance: x / SG: x / pH: x  Gluc: 59 mg/dL / Ketone: x  / Bili: x / Urobili: x   Blood: x / Protein: x / Nitrite: x   Leuk Esterase: x / RBC: x / WBC x   Sq Epi: x / Non Sq Epi: x / Bacteria: x    RADIOLOGY & ADDITIONAL STUDIES:     < from: CT Abdomen and Pelvis w/ IV Cont (05.24.24 @ 19:54) >    No evidence of acute abdominal pathology.    Hepatic steatosis.    < end of copied text >

## 2024-05-24 NOTE — ED ADULT NURSE NOTE - NS_SISCREENINGSR_GEN_ALL_ED
[facility]    2024 1:25 PM    Assessment/Plan:    Patient presents today with therapeutic INR.    Assessment of patient findings and chart review: no significant findings     Recommendation for patient's warfarin regimen: Continue current maintenance dose    Recommend repeat INR in 1 week  _________________________________________________________________    Tim Richards (57 y.o.) is followed by the [facility].    Anticoagulation Summary  As of 2024      INR goal:  2.0-3.0   TTR:  70.4% (5.6 y)   INR used for dosin.3 (2024)   Warfarin maintenance plan:  7.5 mg (5 mg x 1.5) every Thu; 5 mg (5 mg x 1) all other days   Weekly warfarin total:  37.5 mg   Plan last modified:  Radha Eugene, PharmD (10/18/2023)   Next INR check:  2024   Target end date:      Indications    LVAD (left ventricular assist device) present (Resolved) [Z95.811]  Anticoagulation monitoring  INR range 2-3 (Resolved) [Z79.01]                 Anticoagulation Episode Summary       INR check location:  Clinic Lab    Preferred lab:      [facility] IN[facility] reminders [facility]:  [facility]    Comments:  LVAD/ [facility]/Lab [place] Results:[phone] [facility]# [id number] Phone: [phone] [phone]/dc1/ [facility] [phone],fax [phone]/ [place]:NO(h/o bleeds) see 3/12 encounter for meter process          Anticoagulation Care Providers       Provider Role Specialty Phone number    Antonio Hadley Jr., MD Responsible Cardiology [phone]                            
Negative

## 2024-05-24 NOTE — ED ADULT NURSE NOTE - CHIEF COMPLAINT QUOTE
Pt. presents to the ED for detox. Pt. was sober for 8 years and now drinking 1/2 bottle of [place] x 10 days. Pt. currently intoxated in triage; A&O x4. FS 72 by EMS.

## 2024-05-24 NOTE — ED PROVIDER NOTE - PHYSICAL EXAMINATION
CONSTITUTIONAL: NAD  SKIN: Warm dry  HEAD: NCAT  EYES: NL inspection  ENT: MMM  NECK: Supple; non tender.  CARD: RRR  RESP: CTAB  ABD: soft, +mild epigastric ttp, no R/G  EXT: no pedal edema  NEURO: Grossly unremarkable  PSYCH: Cooperative, appropriate.

## 2024-05-24 NOTE — H&P ADULT - ASSESSMENT
64-year-old male with history of alcohol use disorder, throat cancer in remission, COPD not on home O2, smoker ( 80 pack years) presents with acute alcohol intoxication. Pt wanted to go to rehab today but was told to come to ED instead. He admits his last drink was 4 hours prior to ED arrival. He endorses feeling nauseas.  Patient admits he has not had anything to eat for the last 10 days.  Patient denies any trauma falls numbness tingling weakness vomiting. Denies history of alcohol withdrawal seizure.     PLAN:     #Acute alcohol intoxication   #HAGMA likely elevated lactic acid   #HO alcohol use disorder   - last drink 4 hours ago   - c/w [facility] protocol  - CATCH team evaluation   - Fall aspiration  - Aspiration precautions     #Macrocytic anemia  #Suspected thiamine deficiency   #Suspected Folic acid deficiency    - likely 2/2 chronic alcohol use   - s/p IV thiamine 500 mg   - c/w thiamine  mg daily   - c/w Folic acid supplement  - c/w daily vitamins   - check Vitamin B12     #SERVANDO   #Hyponatremia   - likely prerenal   - check urine studies   - serum osm 327   - Renal US     #COPD not on home o2  #Not in exacerbation  #Active smoker ( 80 pack year smoking hx)  - c/w home inhalers     #DVT PPx: Lovenox  #GI Ppx: PPI  #Activity: IAT  #Diet: Regular  64-year-old male with history of alcohol use disorder, throat cancer in remission, COPD not on home O2, smoker ( 80 pack years) presents with acute alcohol intoxication. Pt wanted to go to rehab today but was told to come to ED instead. He admits his last drink was 4 hours prior to ED arrival. He endorses feeling nauseas.  Patient admits he has not had anything to eat for the last 10 days.  Patient denies any trauma falls numbness tingling weakness vomiting. Denies history of alcohol withdrawal seizure.     PLAN:     #Acute alcohol intoxication   #HAGMA likely elevated lactic acid   #HO alcohol use disorder   - last drink 4 hours ago   - c/w [facility] protocol  - CATCH team evaluation   - Zofran PRN   - Fall aspiration  - Aspiration precautions     #Macrocytic anemia  #Suspected thiamine deficiency   #Suspected Folic acid deficiency    - likely 2/2 chronic alcohol use   - s/p IV thiamine 500 mg   - c/w thiamine  mg daily   - c/w Folic acid supplement  - c/w daily vitamins   - check Vitamin B12     #SERVANDO   #Hyponatremia   - likely prerenal   - check urine studies   - serum osm 327   - Renal US     #COPD not on home o2  #Not in exacerbation  #Active smoker ( 80 pack year smoking hx)  - Not on any inhalers at home   - start albuterol PRN     #DVT PPx: Lovenox  #GI Ppx: PPI  #Activity: IAT  #Diet: Regular

## 2024-05-24 NOTE — ED ADULT NURSE NOTE - NSFALLUNIVINTERV_ED_ALL_ED
Bed/Stretcher in lowest position, wheels locked, appropriate side rails in place/Call bell, personal items and telephone in reach/Instruct patient to call for assistance before getting out of bed/chair/stretcher/Non-slip footwear applied when patient is off stretcher/[place] to call system/Physically safe environment - no spills, clutter or unnecessary equipment/Purposeful proactive rounding/Room/bathroom lighting operational, light cord in reach

## 2024-05-24 NOTE — SBIRT NOTE ADULT - NSSBIRTALCACTIVEREFTXDET_GEN_A_CORE
Patient provided with a referral to substance use treatment at [facility], [address] , [place] [address], [phone], [phone]

## 2024-05-24 NOTE — ED PROVIDER NOTE - SEVERE SEPSIS CRITERIA MET YN (MLM)
Writer attempted to contact patient to reschedule her appointment that was canceled due to writer's family emergency.    Sepsis Criteria were met:

## 2024-05-24 NOTE — ED PROVIDER NOTE - PROGRESS NOTE DETAILS
ss Patient to be admitted to an inpatient floor. Pt notified and agreeable to plan. Case discussed with and care endorsed to medical admitting resident.    Consulted ICU fellow regarding admission - stable for floor as per recommendation.  CIWA 0

## 2024-05-24 NOTE — SBIRT NOTE ADULT - NSSBIRTBRIEFINTDET_GEN_A_CORE
Screening results were reviewed with the patient. Patient was provided educational materials on low-risk guidelines and substance use and health. Motivation and goals were discussed.

## 2024-05-24 NOTE — ED ADULT TRIAGE NOTE - CHIEF COMPLAINT QUOTE
Pt. presents to the ED for detox. Pt. was sober for 8 years and now drinking 1/2 bottle of [place] x 10 days. Pt. currently intoxated in triage; A&O x4. FS 72 by EMS.

## 2024-05-24 NOTE — ED PROVIDER NOTE - CONSIDERATION OF ADMISSION OBSERVATION
Pt requires admission to hospital for abd pain, decreased po intake, elevated lipase, alcohol use disorder/impending withdrawal. Consideration of Admission/Observation

## 2024-05-24 NOTE — H&P ADULT - NSHPPHYSICALEXAM_GEN_ALL_CORE
T(C): 36.6 (05-24-24 @ 19:34), Max: 36.9 (05-24-24 @ 16:20)  HR: 76 (05-24-24 @ 19:34) (73 - 76)  BP: 136/86 (05-24-24 @ 19:34) (136/86 - 144/76)  RR: 18 (05-24-24 @ 19:34) (18 - 18)  SpO2: 97% (05-24-24 @ 19:34) (97% - 99%)< from: CT Abdomen and Pelvis w/ IV Cont (05.24.24 @ 19:54) >      IMPRESSION:    No evidence of acute abdominal pathology.    Hepatic steatosis.    --- End of Report ---        CONSTITUTIONAL: Well groomed, no apparent distress    EYES: PERRLA and symmetric, EOMI, No conjunctival or scleral injection, non-icteric    ENMT: Oral mucosa with moist membranes. No external nasal lesions; nasal mucosa not inflamed; no pharyngeal injection or exudates    NECK: Supple, symmetric and without tracheal deviation    RESPIRATORY: No respiratory distress, no use of accessory muscles; CTA b/l, no wheezes, rales or rhonchi    CARDIOVASCULAR: RRRR, +S1S2, no murmurs    GASTROINTESTINAL: Soft, non tender, non distended, no rebound, no guarding    MUSCULOSKELETAL: no digital clubbing or cyanosis; examination of the (head/neck, spine/ribs/pelvis, RUE, LUE, RLE, LLE) without misalignment    SKIN: No rashes or ulcers noted; no subcutaneous nodules or induration palpable    NEUROLOGIC: No neurological deficit noted     PSYCHIATRIC: Appropriate insight/judgment; A+O x 3, mood and affect appropriate, recent/remote memory intact CONSTITUTIONAL: Well groomed, no apparent distress    EYES: PERRLA and symmetric, EOMI, No conjunctival or scleral injection, non-icteric    ENMT: Oral mucosa with moist membranes. No external nasal lesions; nasal mucosa not inflamed; no pharyngeal injection or exudates    NECK: Supple, symmetric and without tracheal deviation    RESPIRATORY: No respiratory distress, no use of accessory muscles; CTA b/l, no wheezes, rales or rhonchi    CARDIOVASCULAR: RRRR, +S1S2, no murmurs    GASTROINTESTINAL: Soft, non-distended, mild diffuse tenderness present on palpation     MUSCULOSKELETAL: no digital clubbing or cyanosis; examination of the (head/neck, spine/ribs/pelvis, RUE, LUE, RLE, LLE) without misalignment    SKIN: No rashes or ulcers noted; no subcutaneous nodules or induration palpable    NEUROLOGIC: No neurological deficit noted     PSYCHIATRIC: Appropriate insight/judgment; A+O x 3, mood and affect appropriate, recent/remote memory intact

## 2024-05-24 NOTE — ED PROVIDER NOTE - CARE PLAN
1 Principal Discharge DX:	Alcoholic ketoacidosis  Secondary Diagnosis:	Abdominal pain  Secondary Diagnosis:	Severe alcohol use disorder

## 2024-05-25 LAB
ALBUMIN SERPL ELPH-MCNC: 3.9 G/DL — SIGNIFICANT CHANGE UP (ref 3.5–5.2)
ALBUMIN SERPL ELPH-MCNC: 4 G/DL — SIGNIFICANT CHANGE UP (ref 3.5–5.2)
ALP SERPL-CCNC: 88 U/L — SIGNIFICANT CHANGE UP (ref 30–115)
ALP SERPL-CCNC: 90 U/L — SIGNIFICANT CHANGE UP (ref 30–115)
ALT FLD-CCNC: 16 U/L — SIGNIFICANT CHANGE UP (ref 0–41)
ALT FLD-CCNC: 16 U/L — SIGNIFICANT CHANGE UP (ref 0–41)
ANION GAP SERPL CALC-SCNC: 15 MMOL/L — HIGH (ref 7–14)
ANION GAP SERPL CALC-SCNC: 18 MMOL/L — HIGH (ref 7–14)
AST SERPL-CCNC: 23 U/L — SIGNIFICANT CHANGE UP (ref 0–41)
AST SERPL-CCNC: 24 U/L — SIGNIFICANT CHANGE UP (ref 0–41)
BASOPHILS # BLD AUTO: 0.02 K/UL — SIGNIFICANT CHANGE UP (ref 0–0.2)
BASOPHILS NFR BLD AUTO: 0.4 % — SIGNIFICANT CHANGE UP (ref 0–1)
BILIRUB SERPL-MCNC: 0.6 MG/DL — SIGNIFICANT CHANGE UP (ref 0.2–1.2)
BILIRUB SERPL-MCNC: 0.8 MG/DL — SIGNIFICANT CHANGE UP (ref 0.2–1.2)
BUN SERPL-MCNC: 41 MG/DL — HIGH (ref 10–20)
BUN SERPL-MCNC: 44 MG/DL — HIGH (ref 10–20)
CALCIUM SERPL-MCNC: 9.1 MG/DL — SIGNIFICANT CHANGE UP (ref 8.4–10.5)
CALCIUM SERPL-MCNC: 9.3 MG/DL — SIGNIFICANT CHANGE UP (ref 8.4–10.4)
CHLORIDE SERPL-SCNC: 93 MMOL/L — LOW (ref 98–110)
CHLORIDE SERPL-SCNC: 94 MMOL/L — LOW (ref 98–110)
CO2 SERPL-SCNC: 22 MMOL/L — SIGNIFICANT CHANGE UP (ref 17–32)
CO2 SERPL-SCNC: 26 MMOL/L — SIGNIFICANT CHANGE UP (ref 17–32)
CREAT SERPL-MCNC: 1.4 MG/DL — SIGNIFICANT CHANGE UP (ref 0.7–1.5)
CREAT SERPL-MCNC: 1.5 MG/DL — SIGNIFICANT CHANGE UP (ref 0.7–1.5)
EGFR: 52 ML/MIN/1.73M2 — LOW
EGFR: 56 ML/MIN/1.73M2 — LOW
EOSINOPHIL # BLD AUTO: 0.02 K/UL — SIGNIFICANT CHANGE UP (ref 0–0.7)
EOSINOPHIL NFR BLD AUTO: 0.4 % — SIGNIFICANT CHANGE UP (ref 0–8)
ETHANOL SERPL-MCNC: <10 MG/DL — SIGNIFICANT CHANGE UP
FOLATE SERPL-MCNC: 3.4 NG/ML — LOW
GLUCOSE SERPL-MCNC: 106 MG/DL — HIGH (ref 70–99)
GLUCOSE SERPL-MCNC: 73 MG/DL — SIGNIFICANT CHANGE UP (ref 70–99)
HCT VFR BLD CALC: 21.5 % — LOW (ref 42–52)
HGB BLD-MCNC: 7.8 G/DL — LOW (ref 14–18)
IMM GRANULOCYTES NFR BLD AUTO: 2 % — HIGH (ref 0.1–0.3)
LACTATE SERPL-SCNC: 1.4 MMOL/L — SIGNIFICANT CHANGE UP (ref 0.7–2)
LACTATE SERPL-SCNC: 2 MMOL/L — SIGNIFICANT CHANGE UP (ref 0.7–2)
LYMPHOCYTES # BLD AUTO: 0.34 K/UL — LOW (ref 1.2–3.4)
LYMPHOCYTES # BLD AUTO: 6.2 % — LOW (ref 20.5–51.1)
MAGNESIUM SERPL-MCNC: 1.9 MG/DL — SIGNIFICANT CHANGE UP (ref 1.8–2.4)
MAGNESIUM SERPL-MCNC: 2.1 MG/DL — SIGNIFICANT CHANGE UP (ref 1.8–2.4)
MCHC RBC-ENTMCNC: 36.3 G/DL — SIGNIFICANT CHANGE UP (ref 32–37)
MCHC RBC-ENTMCNC: 37.7 PG — HIGH (ref 27–31)
MCV RBC AUTO: 103.9 FL — HIGH (ref 80–94)
MONOCYTES # BLD AUTO: 0.63 K/UL — HIGH (ref 0.1–0.6)
MONOCYTES NFR BLD AUTO: 11.5 % — HIGH (ref 1.7–9.3)
NEUTROPHILS # BLD AUTO: 4.37 K/UL — SIGNIFICANT CHANGE UP (ref 1.4–6.5)
NEUTROPHILS NFR BLD AUTO: 79.5 % — HIGH (ref 42.2–75.2)
NRBC # BLD: 0 /100 WBCS — SIGNIFICANT CHANGE UP (ref 0–0)
PHOSPHATE SERPL-MCNC: 2.7 MG/DL — SIGNIFICANT CHANGE UP (ref 2.1–4.9)
PHOSPHATE SERPL-MCNC: 3 MG/DL — SIGNIFICANT CHANGE UP (ref 2.1–4.9)
PLATELET # BLD AUTO: 156 K/UL — SIGNIFICANT CHANGE UP (ref 130–400)
PMV BLD: 9.7 FL — SIGNIFICANT CHANGE UP (ref 7.4–10.4)
POTASSIUM SERPL-MCNC: 4.7 MMOL/L — SIGNIFICANT CHANGE UP (ref 3.5–5)
POTASSIUM SERPL-MCNC: 4.7 MMOL/L — SIGNIFICANT CHANGE UP (ref 3.5–5)
POTASSIUM SERPL-SCNC: 4.7 MMOL/L — SIGNIFICANT CHANGE UP (ref 3.5–5)
POTASSIUM SERPL-SCNC: 4.7 MMOL/L — SIGNIFICANT CHANGE UP (ref 3.5–5)
PROT SERPL-MCNC: 5.6 G/DL — LOW (ref 6–8)
PROT SERPL-MCNC: 5.7 G/DL — LOW (ref 6–8)
RBC # BLD: 2.07 M/UL — LOW (ref 4.7–6.1)
RBC # FLD: 14.6 % — HIGH (ref 11.5–14.5)
SODIUM SERPL-SCNC: 133 MMOL/L — LOW (ref 135–146)
SODIUM SERPL-SCNC: 135 MMOL/L — SIGNIFICANT CHANGE UP (ref 135–146)
VIT B12 SERPL-MCNC: 747 PG/ML — SIGNIFICANT CHANGE UP (ref 232–1245)
VIT B12 SERPL-MCNC: 796 PG/ML — SIGNIFICANT CHANGE UP (ref 232–1245)
WBC # BLD: 5.49 K/UL — SIGNIFICANT CHANGE UP (ref 4.8–10.8)
WBC # FLD AUTO: 5.49 K/UL — SIGNIFICANT CHANGE UP (ref 4.8–10.8)

## 2024-05-25 PROCEDURE — 99233 SBSQ HOSP IP/OBS HIGH 50: CPT

## 2024-05-25 RX ORDER — PANTOPRAZOLE SODIUM 20 MG/1
40 TABLET, DELAYED RELEASE ORAL
Refills: 0 | Status: DISCONTINUED | OUTPATIENT
Start: 2024-05-25 | End: 2024-05-26

## 2024-05-25 RX ORDER — LANOLIN ALCOHOL/MO/W.PET/CERES
3 CREAM (GRAM) TOPICAL AT BEDTIME
Refills: 0 | Status: DISCONTINUED | OUTPATIENT
Start: 2024-05-25 | End: 2024-05-31

## 2024-05-25 RX ORDER — ONDANSETRON 8 MG/1
4 TABLET, FILM COATED ORAL EVERY 8 HOURS
Refills: 0 | Status: DISCONTINUED | OUTPATIENT
Start: 2024-05-25 | End: 2024-05-31

## 2024-05-25 RX ORDER — ACETAMINOPHEN 500 MG
650 TABLET ORAL EVERY 6 HOURS
Refills: 0 | Status: DISCONTINUED | OUTPATIENT
Start: 2024-05-25 | End: 2024-05-31

## 2024-05-25 RX ORDER — ENOXAPARIN SODIUM 100 MG/ML
40 INJECTION SUBCUTANEOUS EVERY 24 HOURS
Refills: 0 | Status: DISCONTINUED | OUTPATIENT
Start: 2024-05-25 | End: 2024-05-31

## 2024-05-25 RX ADMIN — SODIUM CHLORIDE 100 MILLILITER(S): 9 INJECTION, SOLUTION INTRAVENOUS at 14:25

## 2024-05-25 RX ADMIN — PANTOPRAZOLE SODIUM 40 MILLIGRAM(S): 20 TABLET, DELAYED RELEASE ORAL at 06:03

## 2024-05-25 RX ADMIN — ENOXAPARIN SODIUM 40 MILLIGRAM(S): 100 INJECTION SUBCUTANEOUS at 05:05

## 2024-05-25 RX ADMIN — Medication 100 MILLIGRAM(S): at 12:03

## 2024-05-25 RX ADMIN — Medication 1 MILLIGRAM(S): at 12:03

## 2024-05-25 RX ADMIN — Medication 2 MILLIGRAM(S): at 12:39

## 2024-05-25 RX ADMIN — Medication 1 TABLET(S): at 12:03

## 2024-05-25 RX ADMIN — SODIUM CHLORIDE 125 MILLILITER(S): 9 INJECTION, SOLUTION INTRAVENOUS at 00:00

## 2024-05-25 RX ADMIN — ONDANSETRON 4 MILLIGRAM(S): 8 TABLET, FILM COATED ORAL at 03:11

## 2024-05-25 RX ADMIN — SODIUM CHLORIDE 75 MILLILITER(S): 9 INJECTION, SOLUTION INTRAVENOUS at 05:05

## 2024-05-25 NOTE — PROGRESS NOTE ADULT - SUBJECTIVE AND OBJECTIVE BOX
KRAIG DENNIS  64y Male    CHIEF COMPLAINT:    Patient is a 64y old  Male who presents with a chief complaint of Alcohol intoxication (24 May 2024 23:56)      INTERVAL HPI/OVERNIGHT EVENTS:    Patient seen and examined.    ROS: All other systems are negative.    Vital Signs:    T(F): 98 (05-25-24 @ 05:37), Max: 98.4 (05-24-24 @ 16:20)  HR: 81 (05-25-24 @ 05:37) (73 - 87)  BP: 157/67 (05-25-24 @ 05:37) (136/86 - 181/61)  RR: 18 (05-25-24 @ 05:37) (18 - 18)  SpO2: 99% (05-25-24 @ 05:37) (95% - 99%)  I&O's Summary    Daily Height in cm: 172.72 (24 May 2024 16:20)    Daily   CAPILLARY BLOOD GLUCOSE      POCT Blood Glucose.: 93 mg/dL (24 May 2024 21:35)  POCT Blood Glucose.: 75 mg/dL (24 May 2024 16:26)      PHYSICAL EXAM:    GENERAL:  NAD  SKIN: No rashes or lesions  HENT: Atraumatic. Normocephalic. PERRL. Moist membranes.  NECK: Supple, No JVD. No lymphadenopathy.  PULMONARY: CTA B/L. No wheezing. No rales  CVS: Normal S1, S2. Rate and Rhythm are regular. No murmurs.  ABDOMEN/GI: Soft, Nontender, Nondistended; BS present  EXTREMITIES: Peripheral pulses intact. No edema B/L LE.  NEUROLOGIC:  No motor or sensory deficit.  PSYCH: Alert & oriented x 3    Consultant(s) Notes Reviewed:  [x ] YES  [ ] NO  Care Discussed with Consultants/Other Providers [ x] YES  [ ] NO    EKG reviewed  Telemetry reviewed    LABS:                        10.3   6.15  )-----------( 212      ( 24 May 2024 18:00 )             28.4     05-25    133<L>  |  93<L>  |  44<H>  ----------------------------<  73  4.7   |  22  |  1.4    Ca    9.1      25 May 2024 00:45  Phos  2.7     05-25  Mg     1.9     05-25    TPro  5.6<L>  /  Alb  4.0  /  TBili  0.6  /  DBili  x   /  AST  23  /  ALT  16  /  AlkPhos  90  05-25              RADIOLOGY & ADDITIONAL TESTS:    < from: CT Abdomen and Pelvis w/ IV Cont (05.24.24 @ 19:54) >    IMPRESSION:    No evidence of acute abdominal pathology.    Hepatic steatosis.      < end of copied text >  < from: Xray Chest 1 View- PORTABLE-Urgent (Xray Chest 1 View- PORTABLE-Urgent .) (05.24.24 @ 21:24) >  Impression:    No radiographic evidence of acute cardiopulmonary disease.. Hiatal hernia        < end of copied text >    Imaging or report Personally Reviewed:  [x ] YES  [ ] NO    Medications:  Standing  enoxaparin Injectable 40 milliGRAM(s) SubCutaneous every 24 hours  folic acid 1 milliGRAM(s) Oral daily  lactated ringers. 1000 milliLiter(s) IV Continuous <Continuous>  multivitamin 1 Tablet(s) Oral daily  pantoprazole    Tablet 40 milliGRAM(s) Oral before breakfast  thiamine 100 milliGRAM(s) Oral daily    PRN Meds  acetaminophen     Tablet .. 650 milliGRAM(s) Oral every 6 hours PRN  aluminum hydroxide/magnesium hydroxide/simethicone Suspension 30 milliLiter(s) Oral every 4 hours PRN  LORazepam   Injectable 2 milliGRAM(s) IV Push every 2 hours PRN  melatonin 3 milliGRAM(s) Oral at bedtime PRN  ondansetron Injectable 4 milliGRAM(s) IV Push every 8 hours PRN      Case discussed with resident    Care discussed with pt/family           KRAIG DENNIS  64y Male    CHIEF COMPLAINT:    Patient is a 64y old  Male who presents with a chief complaint of Alcohol intoxication (24 May 2024 23:56)      INTERVAL HPI/OVERNIGHT EVENTS:    Patient seen and examined. Pt states that he is shaking and having headache. No N/V. BP is running high    ROS: All other systems are negative.    Vital Signs:    T(F): 98 (05-25-24 @ 05:37), Max: 98.4 (05-24-24 @ 16:20)  HR: 81 (05-25-24 @ 05:37) (73 - 87)  BP: 157/67 (05-25-24 @ 05:37) (136/86 - 181/61)  RR: 18 (05-25-24 @ 05:37) (18 - 18)  SpO2: 99% (05-25-24 @ 05:37) (95% - 99%)  I&O's Summary    Daily Height in cm: 172.72 (24 May 2024 16:20)    Daily   CAPILLARY BLOOD GLUCOSE      POCT Blood Glucose.: 93 mg/dL (24 May 2024 21:35)  POCT Blood Glucose.: 75 mg/dL (24 May 2024 16:26)      PHYSICAL EXAM:    GENERAL:  NAD  SKIN: No rashes or lesions  HENT: Atraumatic. Normocephalic. PERRL. Moist membranes.  NECK: Supple, No JVD. No lymphadenopathy.  PULMONARY: CTA B/L. No wheezing. No rales  CVS: Normal S1, S2. Rate and Rhythm are regular. No murmurs.  ABDOMEN/GI: Soft, Nontender, Nondistended; BS present  EXTREMITIES: Peripheral pulses intact. No edema B/L LE.  NEUROLOGIC:  No motor or sensory deficit.  PSYCH: Alert & oriented x 3    Consultant(s) Notes Reviewed:  [x ] YES  [ ] NO  Care Discussed with Consultants/Other Providers [ x] YES  [ ] NO    EKG reviewed  Telemetry reviewed    LABS:                        10.3   6.15  )-----------( 212      ( 24 May 2024 18:00 )             28.4     05-25    133<L>  |  93<L>  |  44<H>  ----------------------------<  73  4.7   |  22  |  1.4    Ca    9.1      25 May 2024 00:45  Phos  2.7     05-25  Mg     1.9     05-25    TPro  5.6<L>  /  Alb  4.0  /  TBili  0.6  /  DBili  x   /  AST  23  /  ALT  16  /  AlkPhos  90  05-25              RADIOLOGY & ADDITIONAL TESTS:    < from: CT Abdomen and Pelvis w/ IV Cont (05.24.24 @ 19:54) >    IMPRESSION:    No evidence of acute abdominal pathology.    Hepatic steatosis.      < end of copied text >  < from: Xray Chest 1 View- PORTABLE-Urgent (Xray Chest 1 View- PORTABLE-Urgent .) (05.24.24 @ 21:24) >  Impression:    No radiographic evidence of acute cardiopulmonary disease.. Hiatal hernia        < end of copied text >    Imaging or report Personally Reviewed:  [x ] YES  [ ] NO    Medications:  Standing  enoxaparin Injectable 40 milliGRAM(s) SubCutaneous every 24 hours  folic acid 1 milliGRAM(s) Oral daily  lactated ringers. 1000 milliLiter(s) IV Continuous <Continuous>  multivitamin 1 Tablet(s) Oral daily  pantoprazole    Tablet 40 milliGRAM(s) Oral before breakfast  thiamine 100 milliGRAM(s) Oral daily    PRN Meds  acetaminophen     Tablet .. 650 milliGRAM(s) Oral every 6 hours PRN  aluminum hydroxide/magnesium hydroxide/simethicone Suspension 30 milliLiter(s) Oral every 4 hours PRN  LORazepam   Injectable 2 milliGRAM(s) IV Push every 2 hours PRN  melatonin 3 milliGRAM(s) Oral at bedtime PRN  ondansetron Injectable 4 milliGRAM(s) IV Push every 8 hours PRN      Case discussed with resident    Care discussed with pt/family

## 2024-05-25 NOTE — PATIENT PROFILE ADULT - FALL HARM RISK - RISK INTERVENTIONS

## 2024-05-25 NOTE — PROGRESS NOTE ADULT - ASSESSMENT
64-year-old male with history of alcohol use disorder, throat cancer in remission, COPD not on home O2, active smoker ( 80 pack years) presents with acute alcohol intoxication. Pt states he has been drinking whiskey around the clock ( 4-6 glasses/ day) for the past 2-3 weeks without drinking any water or having any food. He states he was weaned off the morphine ( which was prescibed to him for throat pain) 2-3 weeks ago and since then he has been drinking alcohol to relieve pain.    Alcohol intoxication  Alcohol use disorder  Starvation ketosis  COPD  Tobacco use  H/O throat Ca in remission              PLAN: 64-year-old male with history of alcohol use disorder, throat cancer in remission, COPD not on home O2, active smoker ( 80 pack years) presents with acute alcohol intoxication. Pt states he has been drinking whiskey around the clock ( 4-6 glasses/ day) for the past 2-3 weeks without drinking any water or having any food. He states he was weaned off the morphine ( which was prescibed to him for throat pain) 2-3 weeks ago and since then he has been drinking alcohol to relieve pain.    Alcohol intoxication  Alcohol use disorder  Starvation ketosis  SERVANDO  Hyponatremia  COPD  Tobacco use  H/O throat Ca in remission              PLAN: 64-year-old male with history of alcohol use disorder, throat cancer in remission, COPD not on home O2, active smoker ( 80 pack years) presents with acute alcohol intoxication. Pt states he has been drinking whiskey around the clock ( 4-6 glasses/ day) for the past 2-3 weeks without drinking any water or having any food. He states he was weaned off the morphine ( which was prescibed to him for throat pain) 2-3 weeks ago and since then he has been drinking alcohol to relieve pain.    Alcohol withdrawal   Alcohol use disorder  Starvation ketosis  SERVANDO  Hyponatremia, resolved  COPD  Tobacco use  H/O throat Ca in remission              PLAN:    ·	Tele reviewed. No events  ·	EKG on admission: NSR 90/min (Interpreted by me)  ·	Alcohol level is <10  ·	CIWA score is 7  ·	Ativan 2 mg ivp q 2h prn for withdrawal.   ·	Cont  cc/hr  ·	Check beta hydroxy butyrate.   ·	Cont MVI, Thiamine and Folic acid.   ·	Monitor electrolytes  ·	CATCH team consult  ·	SERVANDO, monitor renal function. Likely prerenal.   ·	PT eval    Progress Note Handoff    Pending (specify):  Consults__CATCH team_______, Tests________, Test Results_______, Other_Alcohol withdrawal. ________  Family discussion:  Disposition: Home___/SNF___/Other________/Unknown at this time________    Blake Franco MD  Spectra: [phone]

## 2024-05-26 LAB
ALBUMIN SERPL ELPH-MCNC: 3.6 G/DL — SIGNIFICANT CHANGE UP (ref 3.5–5.2)
ALP SERPL-CCNC: 82 U/L — SIGNIFICANT CHANGE UP (ref 30–115)
ALT FLD-CCNC: 18 U/L — SIGNIFICANT CHANGE UP (ref 0–41)
ANION GAP SERPL CALC-SCNC: 8 MMOL/L — SIGNIFICANT CHANGE UP (ref 7–14)
AST SERPL-CCNC: 30 U/L — SIGNIFICANT CHANGE UP (ref 0–41)
B-OH-BUTYR SERPL-SCNC: <0.2 MMOL/L — SIGNIFICANT CHANGE UP
B-OH-BUTYR SERPL-SCNC: <0.2 MMOL/L — SIGNIFICANT CHANGE UP
BASOPHILS # BLD AUTO: 0.04 K/UL — SIGNIFICANT CHANGE UP (ref 0–0.2)
BASOPHILS NFR BLD AUTO: 0.7 % — SIGNIFICANT CHANGE UP (ref 0–1)
BILIRUB SERPL-MCNC: 0.5 MG/DL — SIGNIFICANT CHANGE UP (ref 0.2–1.2)
BUN SERPL-MCNC: 29 MG/DL — HIGH (ref 10–20)
CALCIUM SERPL-MCNC: 9.2 MG/DL — SIGNIFICANT CHANGE UP (ref 8.4–10.4)
CHLORIDE SERPL-SCNC: 97 MMOL/L — LOW (ref 98–110)
CO2 SERPL-SCNC: 30 MMOL/L — SIGNIFICANT CHANGE UP (ref 17–32)
CREAT SERPL-MCNC: 1.4 MG/DL — SIGNIFICANT CHANGE UP (ref 0.7–1.5)
EGFR: 56 ML/MIN/1.73M2 — LOW
EOSINOPHIL # BLD AUTO: 0.04 K/UL — SIGNIFICANT CHANGE UP (ref 0–0.7)
EOSINOPHIL NFR BLD AUTO: 0.7 % — SIGNIFICANT CHANGE UP (ref 0–8)
GLUCOSE SERPL-MCNC: 109 MG/DL — HIGH (ref 70–99)
HCT VFR BLD CALC: 21.6 % — LOW (ref 42–52)
HGB BLD-MCNC: 7.6 G/DL — LOW (ref 14–18)
IMM GRANULOCYTES NFR BLD AUTO: 3.3 % — HIGH (ref 0.1–0.3)
IRON SATN MFR SERPL: 34 % — SIGNIFICANT CHANGE UP (ref 15–50)
IRON SATN MFR SERPL: 63 UG/DL — SIGNIFICANT CHANGE UP (ref 35–150)
LYMPHOCYTES # BLD AUTO: 0.4 K/UL — LOW (ref 1.2–3.4)
LYMPHOCYTES # BLD AUTO: 6.9 % — LOW (ref 20.5–51.1)
MAGNESIUM SERPL-MCNC: 1.5 MG/DL — LOW (ref 1.8–2.4)
MCHC RBC-ENTMCNC: 35.2 G/DL — SIGNIFICANT CHANGE UP (ref 32–37)
MCHC RBC-ENTMCNC: 37.1 PG — HIGH (ref 27–31)
MCV RBC AUTO: 105.4 FL — HIGH (ref 80–94)
MONOCYTES # BLD AUTO: 0.61 K/UL — HIGH (ref 0.1–0.6)
MONOCYTES NFR BLD AUTO: 10.5 % — HIGH (ref 1.7–9.3)
NEUTROPHILS # BLD AUTO: 4.53 K/UL — SIGNIFICANT CHANGE UP (ref 1.4–6.5)
NEUTROPHILS NFR BLD AUTO: 77.9 % — HIGH (ref 42.2–75.2)
NRBC # BLD: 0 /100 WBCS — SIGNIFICANT CHANGE UP (ref 0–0)
PLATELET # BLD AUTO: 142 K/UL — SIGNIFICANT CHANGE UP (ref 130–400)
PMV BLD: 10 FL — SIGNIFICANT CHANGE UP (ref 7.4–10.4)
POTASSIUM SERPL-MCNC: 4 MMOL/L — SIGNIFICANT CHANGE UP (ref 3.5–5)
POTASSIUM SERPL-SCNC: 4 MMOL/L — SIGNIFICANT CHANGE UP (ref 3.5–5)
PROT SERPL-MCNC: 5.3 G/DL — LOW (ref 6–8)
RBC # BLD: 2.05 M/UL — LOW (ref 4.7–6.1)
RBC # FLD: 14.5 % — SIGNIFICANT CHANGE UP (ref 11.5–14.5)
SODIUM SERPL-SCNC: 135 MMOL/L — SIGNIFICANT CHANGE UP (ref 135–146)
TIBC SERPL-MCNC: 185 UG/DL — LOW (ref 220–430)
UIBC SERPL-MCNC: 122 UG/DL — SIGNIFICANT CHANGE UP (ref 110–370)
WBC # BLD: 5.81 K/UL — SIGNIFICANT CHANGE UP (ref 4.8–10.8)
WBC # FLD AUTO: 5.81 K/UL — SIGNIFICANT CHANGE UP (ref 4.8–10.8)

## 2024-05-26 PROCEDURE — 99233 SBSQ HOSP IP/OBS HIGH 50: CPT

## 2024-05-26 PROCEDURE — 99223 1ST HOSP IP/OBS HIGH 75: CPT

## 2024-05-26 RX ORDER — BENZOCAINE 10 %
1 GEL (GRAM) MUCOUS MEMBRANE ONCE
Refills: 0 | Status: COMPLETED | OUTPATIENT
Start: 2024-05-26 | End: 2024-05-26

## 2024-05-26 RX ORDER — PANTOPRAZOLE SODIUM 20 MG/1
40 TABLET, DELAYED RELEASE ORAL
Refills: 0 | Status: DISCONTINUED | OUTPATIENT
Start: 2024-05-26 | End: 2024-05-30

## 2024-05-26 RX ORDER — NICOTINE POLACRILEX 2 MG
1 GUM BUCCAL DAILY
Refills: 0 | Status: DISCONTINUED | OUTPATIENT
Start: 2024-05-26 | End: 2024-05-31

## 2024-05-26 RX ORDER — MAGNESIUM SULFATE 500 MG/ML
2 VIAL (ML) INJECTION ONCE
Refills: 0 | Status: COMPLETED | OUTPATIENT
Start: 2024-05-26 | End: 2024-05-26

## 2024-05-26 RX ADMIN — SODIUM CHLORIDE 100 MILLILITER(S): 9 INJECTION, SOLUTION INTRAVENOUS at 21:03

## 2024-05-26 RX ADMIN — Medication 1 TABLET(S): at 11:14

## 2024-05-26 RX ADMIN — SODIUM CHLORIDE 100 MILLILITER(S): 9 INJECTION, SOLUTION INTRAVENOUS at 10:46

## 2024-05-26 RX ADMIN — Medication 1 PATCH: at 11:14

## 2024-05-26 RX ADMIN — Medication 25 GRAM(S): at 09:10

## 2024-05-26 RX ADMIN — Medication 650 MILLIGRAM(S): at 17:17

## 2024-05-26 RX ADMIN — PANTOPRAZOLE SODIUM 40 MILLIGRAM(S): 20 TABLET, DELAYED RELEASE ORAL at 17:15

## 2024-05-26 RX ADMIN — Medication 1 SPRAY(S): at 21:04

## 2024-05-26 RX ADMIN — Medication 1 MILLIGRAM(S): at 11:13

## 2024-05-26 RX ADMIN — Medication 100 MILLIGRAM(S): at 11:13

## 2024-05-26 RX ADMIN — PANTOPRAZOLE SODIUM 40 MILLIGRAM(S): 20 TABLET, DELAYED RELEASE ORAL at 09:10

## 2024-05-26 RX ADMIN — PANTOPRAZOLE SODIUM 40 MILLIGRAM(S): 20 TABLET, DELAYED RELEASE ORAL at 05:18

## 2024-05-26 RX ADMIN — ENOXAPARIN SODIUM 40 MILLIGRAM(S): 100 INJECTION SUBCUTANEOUS at 05:18

## 2024-05-26 RX ADMIN — SODIUM CHLORIDE 100 MILLILITER(S): 9 INJECTION, SOLUTION INTRAVENOUS at 00:42

## 2024-05-26 NOTE — CONSULT NOTE ADULT - ASSESSMENT
64-year-old male with history of heavy alcohol use disorder, throat cancer s/p chemo/RT (follows ), chronic PATSY on IV iron/blood transfusions COPD not on home O2, active smoker ( 80 pack years) presents with acute alcohol intoxication. Pt states he has been drinking whiskey around the clock ( 1bottle / day) for the past 2-3 weeks without drinking any water or having any food. He states he was weaned off the morphine/fentanyl for throat pain 2-3 weeks ago and since then he has been drinking alcohol to relieve pain. He admits his last drink was 1 PM on 5/24/24. He endorses feeling nauseous and has symptoms of withdrawal.  Patient denies any abdominal pain. Reports dark black stool this am but no blood.  He reports odynophagia since radiation but tolerating diet currently. Reports taking advil everyday. GI consulted for reported black stool, and chronic anemia.    #Chronic macrocytic anemia with PATSY  #Reported dark stool r/o upper GI source  #Chronic odynophagia with hx of throat cancer  - Hemodynamically stable & no active bleeding  - Baseline hemoglobin - 8  - Hemoglobin on admission - 10.3>7.8>7.6  - reviewed Iron studies, receives iron transfusions per patient , follows   - No AC  - NSAID+, used morphine/fentanyl    #Rec  - Recommend ENT  - Maintain active Type and screen  - Trend H&H BID  - Please place x2 18G IVs  - Please start IV fluids (SBP >90)   - Please start pantoprazole 40 IV BID  - Please give IV erythromycin 250mg 30 minutes before procedure  - will plan for EGD on  - Please correct electrolytes (Target Na 135-145, Mg 1.7-2.2, K 3.5-5)  - Please correct INR to <1.5  - Please target Hb  >7 or >9   - Please avoid any NSAIDs  - NPO after midnight for procedure  - If any unstable bleed, please call GI stat     64-year-old male with history of heavy alcohol use disorder, throat cancer s/p chemo/RT (follows ), chronic PATSY on IV iron/blood transfusions COPD not on home O2, active smoker ( 80 pack years) presents with acute alcohol intoxication. Pt states he has been drinking whiskey around the clock ( 1bottle / day) for the past 2-3 weeks without drinking any water or having any food. He states he was weaned off the morphine/fentanyl for throat pain 2-3 weeks ago and since then he has been drinking alcohol to relieve pain. He admits his last drink was 1 PM on 5/24/24. He endorses feeling nauseous and has symptoms of withdrawal.  Patient denies any abdominal pain. Reports dark black stool this am but no blood.  He reports odynophagia since radiation but tolerating diet currently. Reports taking advil everyday. GI consulted for reported black stool, and chronic anemia.    #Chronic macrocytic anemia with PATSY  #Reported dark stool r/o upper GI source, likely throat cancer related  #Chronic odynophagia with hx of throat cancer  - Hemodynamically stable & no active bleeding  - Baseline hemoglobin - 8  - Hemoglobin on admission - 10.3>7.8>7.6  - reviewed Iron studies, receives iron transfusions per patient , follows   - No AC  - NSAID+, used morphine/fentanyl    #Rec  - Recommend ENT, onc eval  - offered EGD/Colonoscopy once optimized, patient prefers   - Maintain active Type and screen  - Trend H&H BID  - Please place x2 18G IVs  - Please start pantoprazole 40 IV BID  - Please target Hb  >7  - Please avoid any NSAIDs  - If any unstable bleed, please call GI stat  - CIWA protocol, Mg,thiamine,folate supplementation  - alcohol/smoking cessation advised, CATCH consult

## 2024-05-26 NOTE — CONSULT NOTE ADULT - TIME BILLING
75 minutes spent on total encounter; more than 50% of the visit was spent counseling and / or coordinating care by the attending physician.  The necessity of the time spent during the encounter on this date of service was due to: Coordination of care. No

## 2024-05-26 NOTE — CONSULT NOTE ADULT - ATTENDING COMMENTS
Throat Ca, anemia, melena. Likely bleeding from his cancer. EGD and colonoscopy were offered after he comes out of withdrawals. PPI for now

## 2024-05-26 NOTE — PHYSICAL THERAPY INITIAL EVALUATION ADULT - GENERAL OBSERVATIONS, REHAB EVAL
11:09-11:49 40 min  pt received in bed in NAD, pt agreeable to PT, no c/o pain, RN disconnected IV for amb

## 2024-05-26 NOTE — PROGRESS NOTE ADULT - SUBJECTIVE AND OBJECTIVE BOX
KRAIG DENNIS  64y Male    CHIEF COMPLAINT:    Patient is a 64y old  Male who presents with a chief complaint of Alcohol intoxication (25 May 2024 07:09)      INTERVAL HPI/OVERNIGHT EVENTS:    Patient seen and examined.    ROS: All other systems are negative.    Vital Signs:    T(F): 98.4 (24 @ 05:18), Max: 98.7 (24 @ 12:20)  HR: 75 (24 @ 05:18) (75 - 85)  BP: 130/73 (24 @ 05:18) (130/73 - 169/73)  RR: 18 (24 @ 05:18) (18 - 18)  SpO2: --  I&O's Summary    25 May 2024 07:01  -  26 May 2024 07:00  --------------------------------------------------------  IN: 1650 mL / OUT: 1400 mL / NET: 250 mL      Daily     Daily Weight in k.6 (26 May 2024 05:18)  CAPILLARY BLOOD GLUCOSE          PHYSICAL EXAM:    GENERAL:  NAD  SKIN: No rashes or lesions  HENT: Atraumatic. Normocephalic. PERRL. Moist membranes.  NECK: Supple, No JVD. No lymphadenopathy.  PULMONARY: CTA B/L. No wheezing. No rales  CVS: Normal S1, S2. Rate and Rhythm are regular. No murmurs.  ABDOMEN/GI: Soft, Nontender, Nondistended; BS present  EXTREMITIES: Peripheral pulses intact. No edema B/L LE.  NEUROLOGIC:  No motor or sensory deficit.  PSYCH: Alert & oriented x 3    Consultant(s) Notes Reviewed:  [x ] YES  [ ] NO  Care Discussed with Consultants/Other Providers [ x] YES  [ ] NO    EKG reviewed  Telemetry reviewed    LABS:                        7.6    5.81  )-----------( 142      ( 26 May 2024 06:55 )             21.6         135  |  97<L>  |  29<H>  ----------------------------<  109<H>  4.0   |  30  |  1.4    Ca    9.2      26 May 2024 06:55  Phos  3.0     05-  Mg     1.5         TPro  5.3<L>  /  Alb  3.6  /  TBili  0.5  /  DBili  x   /  AST  30  /  ALT  18  /  AlkPhos  82                RADIOLOGY & ADDITIONAL TESTS:      Imaging or report Personally Reviewed:  [ ] YES  [ ] NO    Medications:  Standing  enoxaparin Injectable 40 milliGRAM(s) SubCutaneous every 24 hours  folic acid 1 milliGRAM(s) Oral daily  lactated ringers. 1000 milliLiter(s) IV Continuous <Continuous>  multivitamin 1 Tablet(s) Oral daily  pantoprazole    Tablet 40 milliGRAM(s) Oral before breakfast  thiamine 100 milliGRAM(s) Oral daily    PRN Meds  acetaminophen     Tablet .. 650 milliGRAM(s) Oral every 6 hours PRN  aluminum hydroxide/magnesium hydroxide/simethicone Suspension 30 milliLiter(s) Oral every 4 hours PRN  LORazepam   Injectable 2 milliGRAM(s) IV Push every 2 hours PRN  melatonin 3 milliGRAM(s) Oral at bedtime PRN  ondansetron Injectable 4 milliGRAM(s) IV Push every 8 hours PRN      Case discussed with resident    Care discussed with pt/family           KRAIG DENNIS  64y Male    CHIEF COMPLAINT:    Patient is a 64y old  Male who presents with a chief complaint of Alcohol intoxication (25 May 2024 07:09)      INTERVAL HPI/OVERNIGHT EVENTS:    Patient seen and examined. C/O feeling shaky. Also c/o having black colored stools. No N/V. No diarrhea.     ROS: All other systems are negative.    Vital Signs:    T(F): 98.4 (24 @ 05:18), Max: 98.7 (24 @ 12:20)  HR: 75 (24 @ 05:18) (75 - 85)  BP: 130/73 (24 @ 05:18) (130/73 - 169/73)  RR: 18 (24 @ 05:18) (18 - 18)  SpO2: --  I&O's Summary    25 May 2024 07:01  -  26 May 2024 07:00  --------------------------------------------------------  IN: 1650 mL / OUT: 1400 mL / NET: 250 mL      Daily     Daily Weight in k.6 (26 May 2024 05:18)  CAPILLARY BLOOD GLUCOSE          PHYSICAL EXAM:    GENERAL:  NAD  SKIN: No rashes or lesions  HENT: Atraumatic. Normocephalic. PERRL. Moist membranes.  NECK: Supple, No JVD. No lymphadenopathy.  PULMONARY: CTA B/L. No wheezing. No rales  CVS: Normal S1, S2. Rate and Rhythm are regular. No murmurs.  ABDOMEN/GI: Soft, Nontender, Nondistended; BS present  EXTREMITIES: Peripheral pulses intact. No edema B/L LE.  NEUROLOGIC:  No motor or sensory deficit.  PSYCH: Alert & oriented x 3    Consultant(s) Notes Reviewed:  [x ] YES  [ ] NO  Care Discussed with Consultants/Other Providers [ x] YES  [ ] NO    EKG reviewed  Telemetry reviewed    LABS:                        7.6    5.81  )-----------( 142      ( 26 May 2024 06:55 )             21.6   Hemoglobin: 7.6 g/dL ( @ 06:55)  Hemoglobin: 7.8 g/dL ( @ 07:04)  Hemoglobin: 10.3 g/dL ( @ 18:00)        135  |  97<L>  |  29<H>  ----------------------------<  109<H>  4.0   |  30  |  1.4    Ca    9.2      26 May 2024 06:55  Phos  3.0       Mg     1.5         TPro  5.3<L>  /  Alb  3.6  /  TBili  0.5  /  DBili  x   /  AST  30  /  ALT  18  /  AlkPhos  82                RADIOLOGY & ADDITIONAL TESTS:      Imaging or report Personally Reviewed:  [ ] YES  [ ] NO    Medications:  Standing  enoxaparin Injectable 40 milliGRAM(s) SubCutaneous every 24 hours  folic acid 1 milliGRAM(s) Oral daily  lactated ringers. 1000 milliLiter(s) IV Continuous <Continuous>  multivitamin 1 Tablet(s) Oral daily  pantoprazole    Tablet 40 milliGRAM(s) Oral before breakfast  thiamine 100 milliGRAM(s) Oral daily    PRN Meds  acetaminophen     Tablet .. 650 milliGRAM(s) Oral every 6 hours PRN  aluminum hydroxide/magnesium hydroxide/simethicone Suspension 30 milliLiter(s) Oral every 4 hours PRN  LORazepam   Injectable 2 milliGRAM(s) IV Push every 2 hours PRN  melatonin 3 milliGRAM(s) Oral at bedtime PRN  ondansetron Injectable 4 milliGRAM(s) IV Push every 8 hours PRN      Case discussed with resident    Care discussed with pt/family

## 2024-05-26 NOTE — CONSULT NOTE ADULT - SUBJECTIVE AND OBJECTIVE BOX
Gastroenterology Consultation:    Patient is a 64y old  Male who presents with a chief complaint of Alcohol intoxication (26 May 2024 08:17)        Admitted on: 05-24-24      HPI:  64-year-old male with history of alcohol use disorder, throat cancer in remission, COPD not on home O2, active smoker ( 80 pack years) presents with acute alcohol intoxication. Pt states he has been drinking whiskey around the clock ( 4-6 glasses/ day) for the past 2-3 weeks without drinking any water or having any food. He states he was weaned off the morphine ( which was prescibed to him for throat pain) 2-3 weeks ago and since then he has been drinking alcohol to relieve pain. He today realized he is reaching to the point of no return and  wanted to go to rehab but was told to come to ED instead. He admits his last drink was 1 PM on 5/24/24. He endorses feeling nauseous.  Patient denies any trauma falls numbness tingling weakness vomiting. Denies history of alcohol withdrawal seizure. Denies any other drug use.     In ED vitals were  T(F): 97.8  HR: 76  BP: 136/86  RR: 18  SpO2: 97%    Labs were significant for Hgb 10.3, .8, Na+ 132, FS 59, Cl 88, Bicab 16, AG 28, BUN 49, Cr 1.5 ( baseline 1.0), lipase 109, serum osm 327,     VBG: pH 7.33, lactate 3.1, pCO2 34,     CTAP: Hepatic steatosis     EKG: NSR     Pt received 2 L IVF, IV thiamine, zofran, famotidine in ED. Pt is being admitted to medicine for further management.  (24 May 2024 23:56)        Prior EGD:    Prior Colonoscopy:      PAST MEDICAL & SURGICAL HISTORY:  Throat cancer      HTN (hypertension)            FAMILY HISTORY:      Social History:  Tobacco:  Alcohol:  Drugs:    Home Medications:  omeprazole 40 mg oral delayed release capsule: 1 cap(s) orally once a day (25 May 2024 01:20)        MEDICATIONS  (STANDING):  enoxaparin Injectable 40 milliGRAM(s) SubCutaneous every 24 hours  folic acid 1 milliGRAM(s) Oral daily  lactated ringers. 1000 milliLiter(s) (100 mL/Hr) IV Continuous <Continuous>  multivitamin 1 Tablet(s) Oral daily  nicotine - 21 mG/24Hr(s) Patch 1 Patch Transdermal daily  pantoprazole  Injectable 40 milliGRAM(s) IV Push two times a day  thiamine 100 milliGRAM(s) Oral daily    MEDICATIONS  (PRN):  acetaminophen     Tablet .. 650 milliGRAM(s) Oral every 6 hours PRN Temp greater or equal to 38C (100.4F), Mild Pain (1 - 3)  aluminum hydroxide/magnesium hydroxide/simethicone Suspension 30 milliLiter(s) Oral every 4 hours PRN Dyspepsia  LORazepam   Injectable 2 milliGRAM(s) IV Push every 2 hours PRN CIWA-Ar score increase by 2 points and a total score of 7 or less  melatonin 3 milliGRAM(s) Oral at bedtime PRN Insomnia  ondansetron Injectable 4 milliGRAM(s) IV Push every 8 hours PRN Nausea and/or Vomiting      Allergies  No Known Allergies      Review of Systems:   Constitutional:  No Fever, No Chills  ENT/Mouth:  No Hearing Changes,  No Difficulty Swallowing  Eyes:  No Eye Pain, No Vision Changes  Cardiovascular:  No Chest Pain, No Palpitations  Respiratory:  No Cough, No Dyspnea  Gastrointestinal:  As described in HPI          Physical Examination:  T(C): 36.3 (05-26-24 @ 11:51), Max: 36.9 (05-26-24 @ 05:18)  HR: 84 (05-26-24 @ 11:51) (75 - 85)  BP: 165/67 (05-26-24 @ 11:51) (130/73 - 165/67)  RR: 18 (05-26-24 @ 11:51) (18 - 18)  SpO2: --      05-25-24 @ 07:01  -  05-26-24 @ 07:00  --------------------------------------------------------  IN: 1650 mL / OUT: 1400 mL / NET: 250 mL          GENERAL: AAOx3, no acute distress.  HEAD:  Atraumatic, Normocephalic  EYES: conjunctiva and sclera clear  CHEST/LUNG: Clear to auscultation bilaterally; No wheeze, rhonchi, or rales  HEART: Regular rate and rhythm; normal S1, S2, No murmurs.  ABDOMEN: Soft, nontender, nondistended; Bowel sounds present  SKIN: Intact, no jaundice        Data:                        7.6    5.81  )-----------( 142      ( 26 May 2024 06:55 )             21.6     Hgb Trend:  7.6  05-26-24 @ 06:55  7.8  05-25-24 @ 07:04  10.3  05-24-24 @ 18:00        05-26    135  |  97<L>  |  29<H>  ----------------------------<  109<H>  4.0   |  30  |  1.4    Ca    9.2      26 May 2024 06:55  Phos  3.0     05-25  Mg     1.5     05-26    TPro  5.3<L>  /  Alb  3.6  /  TBili  0.5  /  DBili  x   /  AST  30  /  ALT  18  /  AlkPhos  82  05-26    Liver panel trend:  TBili 0.5   /   AST 30   /   ALT 18   /   AlkP 82   /   Tptn 5.3   /   Alb 3.6    /   DBili --      05-26  TBili 0.8   /   AST 24   /   ALT 16   /   AlkP 88   /   Tptn 5.7   /   Alb 3.9    /   DBili --      05-25  TBili 0.6   /   AST 23   /   ALT 16   /   AlkP 90   /   Tptn 5.6   /   Alb 4.0    /   DBili --      05-25  TBili 0.6   /   AST 30   /   ALT 21   /   AlkP 115   /   Tptn 7.0   /   Alb 4.6    /   DBili --      05-24              Radiology:       Gastroenterology Consultation:    Patient is a 64y old  Male who presents with a chief complaint of Alcohol intoxication (26 May 2024 08:17)        Admitted on: 05-24-24      HPI:  64-year-old male with history of heavy alcohol use disorder, throat cancer s/p chemo/RT (follows ), chronic PATSY on IV iron/blood transfusions COPD not on home O2, active smoker ( 80 pack years) presents with acute alcohol intoxication. Pt states he has been drinking whiskey around the clock ( 1bottle / day) for the past 2-3 weeks without drinking any water or having any food. He states he was weaned off the morphine/fentanyl for throat pain 2-3 weeks ago and since then he has been drinking alcohol to relieve pain. He admits his last drink was 1 PM on 5/24/24. He endorses feeling nauseous and has symptoms of withdrawal.  Patient denies any abdominal pain. Reports dark black stool this am but no blood.  He reports odynophagia since radiation but tolerating diet currently. GI consulted for reported black stool, and chronic anemia.      Prior EGD: 5 years ago with     Prior Colonoscopy: 5 years ago with       PAST MEDICAL & SURGICAL HISTORY:  Throat cancer      HTN (hypertension)    FAMILY HISTORY:      Social History:  Tobacco:active smoker  Alcohol:active alcohol use  Drugs: denies    Home Medications:  omeprazole 40 mg oral delayed release capsule: 1 cap(s) orally once a day (25 May 2024 01:20)        MEDICATIONS  (STANDING):  enoxaparin Injectable 40 milliGRAM(s) SubCutaneous every 24 hours  folic acid 1 milliGRAM(s) Oral daily  lactated ringers. 1000 milliLiter(s) (100 mL/Hr) IV Continuous <Continuous>  multivitamin 1 Tablet(s) Oral daily  nicotine - 21 mG/24Hr(s) Patch 1 Patch Transdermal daily  pantoprazole  Injectable 40 milliGRAM(s) IV Push two times a day  thiamine 100 milliGRAM(s) Oral daily    MEDICATIONS  (PRN):  acetaminophen     Tablet .. 650 milliGRAM(s) Oral every 6 hours PRN Temp greater or equal to 38C (100.4F), Mild Pain (1 - 3)  aluminum hydroxide/magnesium hydroxide/simethicone Suspension 30 milliLiter(s) Oral every 4 hours PRN Dyspepsia  LORazepam   Injectable 2 milliGRAM(s) IV Push every 2 hours PRN CIWA-Ar score increase by 2 points and a total score of 7 or less  melatonin 3 milliGRAM(s) Oral at bedtime PRN Insomnia  ondansetron Injectable 4 milliGRAM(s) IV Push every 8 hours PRN Nausea and/or Vomiting      Allergies  No Known Allergies      Review of Systems:   Constitutional:  No Fever, No Chills  ENT/Mouth:  No Hearing Changes,  No Difficulty Swallowing  Eyes:  No Eye Pain, No Vision Changes  Cardiovascular:  No Chest Pain, No Palpitations  Respiratory:  No Cough, No Dyspnea  Gastrointestinal:  As described in HPI          Physical Examination:  T(C): 36.3 (05-26-24 @ 11:51), Max: 36.9 (05-26-24 @ 05:18)  HR: 84 (05-26-24 @ 11:51) (75 - 85)  BP: 165/67 (05-26-24 @ 11:51) (130/73 - 165/67)  RR: 18 (05-26-24 @ 11:51) (18 - 18)      05-25-24 @ 07:01  -  05-26-24 @ 07:00  --------------------------------------------------------  IN: 1650 mL / OUT: 1400 mL / NET: 250 mL          GENERAL: AAOx3, no acute distress.  HEAD:  Atraumatic, Normocephalic  EYES: conjunctiva and sclera clear  CHEST/LUNG: Clear to auscultation bilaterally; No wheeze, rhonchi, or rales  HEART: Regular rate and rhythm; normal S1, S2, No murmurs.  ABDOMEN: Soft, nontender, nondistended; Bowel sounds present  SKIN: Intact, no jaundice        Data:                        7.6    5.81  )-----------( 142      ( 26 May 2024 06:55 )             21.6     Hgb Trend:  7.6  05-26-24 @ 06:55  7.8  05-25-24 @ 07:04  10.3  05-24-24 @ 18:00        05-26    135  |  97<L>  |  29<H>  ----------------------------<  109<H>  4.0   |  30  |  1.4    Ca    9.2      26 May 2024 06:55  Phos  3.0     05-25  Mg     1.5     05-26    TPro  5.3<L>  /  Alb  3.6  /  TBili  0.5  /  DBili  x   /  AST  30  /  ALT  18  /  AlkPhos  82  05-26    Liver panel trend:  TBili 0.5   /   AST 30   /   ALT 18   /   AlkP 82   /   Tptn 5.3   /   Alb 3.6    /   DBili --      05-26  TBili 0.8   /   AST 24   /   ALT 16   /   AlkP 88   /   Tptn 5.7   /   Alb 3.9    /   DBili --      05-25  TBili 0.6   /   AST 23   /   ALT 16   /   AlkP 90   /   Tptn 5.6   /   Alb 4.0    /   DBili --      05-25  TBili 0.6   /   AST 30   /   ALT 21   /   AlkP 115   /   Tptn 7.0   /   Alb 4.6    /   DBili --      05-24

## 2024-05-26 NOTE — PROGRESS NOTE ADULT - ASSESSMENT
64-year-old male with history of alcohol use disorder, throat cancer in remission, COPD not on home O2, active smoker ( 80 pack years) presents with acute alcohol intoxication. Pt states he has been drinking whiskey around the clock ( 4-6 glasses/ day) for the past 2-3 weeks without drinking any water or having any food. He states he was weaned off the morphine ( which was prescibed to him for throat pain) 2-3 weeks ago and since then he has been drinking alcohol to relieve pain.    Alcohol withdrawal   Alcohol use disorder  Starvation ketosis  SERVANDO  Hyponatremia, resolved  COPD  Tobacco use  H/O throat Ca in remission              PLAN:    ·	Tele reviewed. No events  ·	EKG on admission: NSR 90/min (Interpreted by me)  ·	Alcohol level is <10  ·	CIWA score is 7  ·	Ativan 2 mg ivp q 2h prn for withdrawal.   ·	Cont  cc/hr  ·	Drop in Hb  ·	Check beta hydroxy butyrate. Iron studies.   ·	Cont MVI, Thiamine and Folic acid.   ·	Monitor electrolytes  ·	CATCH team consult  ·	SERVANDO, monitor renal function. Likely prerenal.   ·	PT eval    Progress Note Handoff    Pending (specify):  Consults__CATCH team_______, Tests________, Test Results_______, Other_Alcohol withdrawal. ________  Family discussion:  Disposition: Home___/SNF___/Other________/Unknown at this time________    Blake Franco MD  Spectra: [phone]         64-year-old male with history of alcohol use disorder, throat cancer in remission, COPD not on home O2, active smoker ( 80 pack years) presents with acute alcohol intoxication. Pt states he has been drinking whiskey around the clock ( 4-6 glasses/ day) for the past 2-3 weeks without drinking any water or having any food. He states he was weaned off the morphine ( which was prescibed to him for throat pain) 2-3 weeks ago and since then he has been drinking alcohol to relieve pain.    Alcohol withdrawal   Alcohol use disorder  Starvation ketosis  SERVANDO  Hyponatremia, resolved  COPD  Tobacco use  H/O throat Ca in remission  Anemia              PLAN:    ·	Tele reviewed. No events  ·	EKG on admission: NSR 90/min (Interpreted by me)  ·	Alcohol level is <10  ·	CIWA score is 8 today  ·	Ativan 2 mg ivp q 2h prn for withdrawal.   ·	Cont  cc/hr  ·	Drop in Hb and having melena  ·	Start him on Protonix IV 40 mg q 12h  ·	GI consult  ·	Iron studies. Stool guaiac.   ·	Check beta hydroxy butyrate.   ·	Cont MVI, Thiamine and Folic acid.   ·	Monitor electrolytes. Replete Mg  ·	CATCH team consult  ·	SERVANDO, monitor renal function. Likely prerenal.   ·	Tobacco use. Started him on Nicotine patch  ·	PT eval    Progress Note Handoff    Pending (specify):  Consults__CATCH team, GI_______, Tests________, Test Results_______, Other_Alcohol withdrawal. ________  Family discussion:  Disposition: Home___/SNF___/Other________/Unknown at this time________    Blake Franco MD  Spectra: [phone]

## 2024-05-26 NOTE — PHYSICAL THERAPY INITIAL EVALUATION ADULT - GAIT DISTANCE, PT EVAL
pt amb 60 ft x 1 and then c/o feeling lightheaded, pt returned to chair in room, BP was 163/70, RN informed

## 2024-05-27 LAB
ABO RH CONFIRMATION: SIGNIFICANT CHANGE UP
ALBUMIN SERPL ELPH-MCNC: 3.2 G/DL — LOW (ref 3.5–5.2)
ALP SERPL-CCNC: 70 U/L — SIGNIFICANT CHANGE UP (ref 30–115)
ALT FLD-CCNC: 38 U/L — SIGNIFICANT CHANGE UP (ref 0–41)
ANION GAP SERPL CALC-SCNC: 8 MMOL/L — SIGNIFICANT CHANGE UP (ref 7–14)
AST SERPL-CCNC: 60 U/L — HIGH (ref 0–41)
BASOPHILS # BLD AUTO: 0.01 K/UL — SIGNIFICANT CHANGE UP (ref 0–0.2)
BASOPHILS NFR BLD AUTO: 0.2 % — SIGNIFICANT CHANGE UP (ref 0–1)
BILIRUB SERPL-MCNC: 0.3 MG/DL — SIGNIFICANT CHANGE UP (ref 0.2–1.2)
BLD GP AB SCN SERPL QL: SIGNIFICANT CHANGE UP
BUN SERPL-MCNC: 21 MG/DL — HIGH (ref 10–20)
CALCIUM SERPL-MCNC: 8.9 MG/DL — SIGNIFICANT CHANGE UP (ref 8.4–10.5)
CHLORIDE SERPL-SCNC: 101 MMOL/L — SIGNIFICANT CHANGE UP (ref 98–110)
CO2 SERPL-SCNC: 30 MMOL/L — SIGNIFICANT CHANGE UP (ref 17–32)
CREAT SERPL-MCNC: 1.3 MG/DL — SIGNIFICANT CHANGE UP (ref 0.7–1.5)
EGFR: 61 ML/MIN/1.73M2 — SIGNIFICANT CHANGE UP
EOSINOPHIL # BLD AUTO: 0.03 K/UL — SIGNIFICANT CHANGE UP (ref 0–0.7)
EOSINOPHIL NFR BLD AUTO: 0.7 % — SIGNIFICANT CHANGE UP (ref 0–8)
FERRITIN SERPL-MCNC: 1846 NG/ML — HIGH (ref 30–400)
GLUCOSE SERPL-MCNC: 101 MG/DL — HIGH (ref 70–99)
HCT VFR BLD CALC: 18.4 % — LOW (ref 42–52)
HCT VFR BLD CALC: 22.8 % — LOW (ref 42–52)
HGB BLD-MCNC: 6.4 G/DL — CRITICAL LOW (ref 14–18)
HGB BLD-MCNC: 8 G/DL — LOW (ref 14–18)
IMM GRANULOCYTES NFR BLD AUTO: 3.9 % — HIGH (ref 0.1–0.3)
LYMPHOCYTES # BLD AUTO: 0.22 K/UL — LOW (ref 1.2–3.4)
LYMPHOCYTES # BLD AUTO: 5.1 % — LOW (ref 20.5–51.1)
MAGNESIUM SERPL-MCNC: 1.5 MG/DL — LOW (ref 1.8–2.4)
MCHC RBC-ENTMCNC: 34.8 G/DL — SIGNIFICANT CHANGE UP (ref 32–37)
MCHC RBC-ENTMCNC: 35.1 G/DL — SIGNIFICANT CHANGE UP (ref 32–37)
MCHC RBC-ENTMCNC: 35.9 PG — HIGH (ref 27–31)
MCHC RBC-ENTMCNC: 37.4 PG — HIGH (ref 27–31)
MCV RBC AUTO: 102.2 FL — HIGH (ref 80–94)
MCV RBC AUTO: 107.6 FL — HIGH (ref 80–94)
MONOCYTES # BLD AUTO: 0.44 K/UL — SIGNIFICANT CHANGE UP (ref 0.1–0.6)
MONOCYTES NFR BLD AUTO: 10.1 % — HIGH (ref 1.7–9.3)
NEUTROPHILS # BLD AUTO: 3.48 K/UL — SIGNIFICANT CHANGE UP (ref 1.4–6.5)
NEUTROPHILS NFR BLD AUTO: 80 % — HIGH (ref 42.2–75.2)
NRBC # BLD: 0 /100 WBCS — SIGNIFICANT CHANGE UP (ref 0–0)
NRBC # BLD: 0 /100 WBCS — SIGNIFICANT CHANGE UP (ref 0–0)
PLATELET # BLD AUTO: 109 K/UL — LOW (ref 130–400)
PLATELET # BLD AUTO: 110 K/UL — LOW (ref 130–400)
PMV BLD: 10.1 FL — SIGNIFICANT CHANGE UP (ref 7.4–10.4)
PMV BLD: 9.6 FL — SIGNIFICANT CHANGE UP (ref 7.4–10.4)
POTASSIUM SERPL-MCNC: 4 MMOL/L — SIGNIFICANT CHANGE UP (ref 3.5–5)
POTASSIUM SERPL-SCNC: 4 MMOL/L — SIGNIFICANT CHANGE UP (ref 3.5–5)
PROT SERPL-MCNC: 4.8 G/DL — LOW (ref 6–8)
RBC # BLD: 1.71 M/UL — LOW (ref 4.7–6.1)
RBC # BLD: 2.23 M/UL — LOW (ref 4.7–6.1)
RBC # FLD: 14.6 % — HIGH (ref 11.5–14.5)
RBC # FLD: 16.8 % — HIGH (ref 11.5–14.5)
SODIUM SERPL-SCNC: 139 MMOL/L — SIGNIFICANT CHANGE UP (ref 135–146)
WBC # BLD: 4.27 K/UL — LOW (ref 4.8–10.8)
WBC # BLD: 4.35 K/UL — LOW (ref 4.8–10.8)
WBC # FLD AUTO: 4.27 K/UL — LOW (ref 4.8–10.8)
WBC # FLD AUTO: 4.35 K/UL — LOW (ref 4.8–10.8)

## 2024-05-27 PROCEDURE — 99232 SBSQ HOSP IP/OBS MODERATE 35: CPT

## 2024-05-27 PROCEDURE — 99233 SBSQ HOSP IP/OBS HIGH 50: CPT

## 2024-05-27 RX ORDER — MAGNESIUM SULFATE 500 MG/ML
2 VIAL (ML) INJECTION
Refills: 0 | Status: COMPLETED | OUTPATIENT
Start: 2024-05-27 | End: 2024-05-27

## 2024-05-27 RX ORDER — MORPHINE SULFATE 50 MG/1
15 CAPSULE, EXTENDED RELEASE ORAL
Refills: 0 | Status: DISCONTINUED | OUTPATIENT
Start: 2024-05-27 | End: 2024-05-27

## 2024-05-27 RX ORDER — MAGNESIUM SULFATE 500 MG/ML
2 VIAL (ML) INJECTION
Refills: 0 | Status: DISCONTINUED | OUTPATIENT
Start: 2024-05-27 | End: 2024-05-27

## 2024-05-27 RX ORDER — BENZOCAINE 10 %
1 GEL (GRAM) MUCOUS MEMBRANE THREE TIMES A DAY
Refills: 0 | Status: DISCONTINUED | OUTPATIENT
Start: 2024-05-27 | End: 2024-05-31

## 2024-05-27 RX ADMIN — MORPHINE SULFATE 15 MILLIGRAM(S): 50 CAPSULE, EXTENDED RELEASE ORAL at 22:11

## 2024-05-27 RX ADMIN — SODIUM CHLORIDE 100 MILLILITER(S): 9 INJECTION, SOLUTION INTRAVENOUS at 10:27

## 2024-05-27 RX ADMIN — Medication 1 MILLIGRAM(S): at 12:07

## 2024-05-27 RX ADMIN — ONDANSETRON 4 MILLIGRAM(S): 8 TABLET, FILM COATED ORAL at 01:58

## 2024-05-27 RX ADMIN — PANTOPRAZOLE SODIUM 40 MILLIGRAM(S): 20 TABLET, DELAYED RELEASE ORAL at 05:37

## 2024-05-27 RX ADMIN — Medication 1 SPRAY(S): at 13:41

## 2024-05-27 RX ADMIN — Medication 2 MILLIGRAM(S): at 06:16

## 2024-05-27 RX ADMIN — Medication 100 MILLIGRAM(S): at 12:07

## 2024-05-27 RX ADMIN — Medication 1 PATCH: at 12:07

## 2024-05-27 RX ADMIN — Medication 1 PATCH: at 07:39

## 2024-05-27 RX ADMIN — PANTOPRAZOLE SODIUM 40 MILLIGRAM(S): 20 TABLET, DELAYED RELEASE ORAL at 17:09

## 2024-05-27 RX ADMIN — MORPHINE SULFATE 15 MILLIGRAM(S): 50 CAPSULE, EXTENDED RELEASE ORAL at 16:26

## 2024-05-27 RX ADMIN — ENOXAPARIN SODIUM 40 MILLIGRAM(S): 100 INJECTION SUBCUTANEOUS at 05:37

## 2024-05-27 RX ADMIN — Medication 25 GRAM(S): at 11:41

## 2024-05-27 RX ADMIN — Medication 25 GRAM(S): at 10:27

## 2024-05-27 RX ADMIN — Medication 1 TABLET(S): at 12:07

## 2024-05-27 RX ADMIN — Medication 650 MILLIGRAM(S): at 01:58

## 2024-05-27 RX ADMIN — Medication 1 PATCH: at 12:04

## 2024-05-27 RX ADMIN — MORPHINE SULFATE 15 MILLIGRAM(S): 50 CAPSULE, EXTENDED RELEASE ORAL at 16:59

## 2024-05-27 NOTE — PROGRESS NOTE ADULT - ASSESSMENT
64-year-old male with history of alcohol use disorder, throat cancer in remission, COPD not on home O2, active smoker ( 80 pack years) presents with acute alcohol intoxication. Pt states he has been drinking whiskey around the clock ( 4-6 glasses/ day) for the past 2-3 weeks without drinking any water or having any food. He states he was weaned off the morphine ( which was prescibed to him for throat pain) 2-3 weeks ago and since then he has been drinking alcohol to relieve pain.    Alcohol withdrawal   Alcohol use disorder  Starvation ketosis  SERVANDO  Hyponatremia, resolved  COPD  Tobacco use  H/O throat Ca   Anemia              PLAN:    ·	Tele reviewed. No events  ·	EKG on admission: NSR 90/min (Interpreted by me)  ·	Alcohol level is <10  ·	CIWA score is 6 today  ·	Ativan 2 mg ivp q 2h prn for withdrawal.   ·	Cont  cc/hr  ·	Drop in Hb and having melena. Hb is 6.4. Transfuse one unit of PRBC's  ·	Monitor H/H and keep Hb >7  ·	Cont Protonix IV 40 mg q 12h  ·	GI consult noted. Recommended ENT and Hem/Onc eval for throat cancer  ·	Stool guaiac. Serum iron is 63 and percent sat is 34  ·	Cont MVI, Thiamine and Folic acid.   ·	Monitor electrolytes. Replete Mg  ·	CATCH team consult  ·	SERVANDO, monitor renal function. Likely prerenal.   ·	Tobacco use. Started him on Nicotine patch  ·	PT eval    Progress Note Handoff    Pending (specify):  Consults__CATCH team, GI_______, Tests________, Test Results_______, Other_Alcohol withdrawal. ________  Family discussion:  Disposition: Home___/SNF___/Other________/Unknown at this time________    Blake Franco MD  Spectra: [phone]

## 2024-05-27 NOTE — PROGRESS NOTE ADULT - SUBJECTIVE AND OBJECTIVE BOX
SUBJECTIVE:    Patient is a 64y old Male who presents with a chief complaint of Alcohol intoxication (27 May 2024 10:29)    Currently admitted to medicine with the primary diagnosis of:    Today is hospital day 3d.     Overnight Events:     reports stool no longer as dark     PAST MEDICAL & SURGICAL HISTORY  Throat cancer    HTN (hypertension)        ALLERGIES:  No Known Allergies    MEDICATIONS:  STANDING MEDICATIONS  enoxaparin Injectable 40 milliGRAM(s) SubCutaneous every 24 hours  folic acid 1 milliGRAM(s) Oral daily  lactated ringers. 1000 milliLiter(s) IV Continuous <Continuous>  multivitamin 1 Tablet(s) Oral daily  nicotine - 21 mG/24Hr(s) Patch 1 Patch Transdermal daily  pantoprazole  Injectable 40 milliGRAM(s) IV Push two times a day  thiamine 100 milliGRAM(s) Oral daily    PRN MEDICATIONS  acetaminophen     Tablet .. 650 milliGRAM(s) Oral every 6 hours PRN  aluminum hydroxide/magnesium hydroxide/simethicone Suspension 30 milliLiter(s) Oral every 4 hours PRN  LORazepam   Injectable 2 milliGRAM(s) IV Push every 2 hours PRN  melatonin 3 milliGRAM(s) Oral at bedtime PRN  ondansetron Injectable 4 milliGRAM(s) IV Push every 8 hours PRN    VITALS:   ICU Vital Signs Last 24 Hrs  T(C): 36.8 (27 May 2024 05:14), Max: 37.6 (26 May 2024 19:49)  T(F): 98.3 (27 May 2024 05:14), Max: 99.6 (26 May 2024 19:49)  HR: 83 (27 May 2024 05:14) (83 - 85)  BP: 145/72 (27 May 2024 05:14) (128/70 - 145/72)  BP(mean): --  ABP: --  ABP(mean): --  RR: 18 (27 May 2024 08:29) (18 - 18)  SpO2: 99% (27 May 2024 08:29) (99% - 99%)    O2 Parameters below as of 27 May 2024 08:29  Patient On (Oxygen Delivery Method): room air            LABS:                        6.4    4.35  )-----------( 110      ( 27 May 2024 06:40 )             18.4     05-27    139  |  101  |  21<H>  ----------------------------<  101<H>  4.0   |  30  |  1.3    Ca    8.9      27 May 2024 06:40  Mg     1.5     05-27    TPro  4.8<L>  /  Alb  3.2<L>  /  TBili  0.3  /  DBili  x   /  AST  60<H>  /  ALT  38  /  AlkPhos  70  05-27      Urinalysis Basic - ( 27 May 2024 06:40 )    Color: x / Appearance: x / SG: x / pH: x  Gluc: 101 mg/dL / Ketone: x  / Bili: x / Urobili: x   Blood: x / Protein: x / Nitrite: x   Leuk Esterase: x / RBC: x / WBC x   Sq Epi: x / Non Sq Epi: x / Bacteria: x                  RADIOLOGY:  < from: Xray Chest 1 View- PORTABLE-Urgent (Xray Chest 1 View- PORTABLE-Urgent .) (05.24.24 @ 21:24) >  No radiographic evidence of acute cardiopulmonary disease.. Hiatal hernia    < end of copied text >    PHYSICAL EXAM:  GEN: eating breakfast   LUNGS: clear   HEART: s1 s2  ABD: nontender   EXT: no lower extremity edema   NEURO: ao 3

## 2024-05-27 NOTE — PROGRESS NOTE ADULT - ASSESSMENT
64-year-old male with history of alcohol use disorder, throat cancer in remission, COPD not on home O2, smoker ( 80 pack years) presents with acute alcohol intoxication. Pt wanted to go to rehab today but was told to come to ED instead. He admits his last drink was 4 hours prior to ED arrival. He endorses feeling nauseas.  Patient admits he has not had anything to eat for the last 10 days.  Patient denies any trauma falls numbness tingling weakness vomiting. Denies history of alcohol withdrawal seizure.     #H/O throat Ca in remission  #melena  #anemia  - order prbc  - active t/s  - protonix bid   - gi consulted  - consult ent and onc    #alcohol withdrawal   #alcohol use disorder   #starvation ketosis   - c/w [facility] protocol  - CATCH team evaluation   - Zofran PRN   Cont  cc/hr  beta hydroxy butyrate neg   Cont MVI, Thiamine and Folic acid.     #Macrocytic anemia  #Suspected thiamine deficiency   #Suspected Folic acid deficiency    - likely 2/2 chronic alcohol use   - s/p IV thiamine 500 mg   - c/w thiamine  mg daily   - c/w Folic acid supplement  - c/w daily vitamins   - check Vitamin B12     #SERVANDO   #Hyponatremia   - likely prerenal   - check urine studies   - serum osm 327     #COPD not on home o2  #Not in exacerbation  #Active smoker ( 80 pack year smoking hx)  - Not on any inhalers at home   - start albuterol PRN     #DVT PPx: Lovenox  #GI Ppx: PPI  #Activity: IAT  #Diet: Regular

## 2024-05-27 NOTE — PROGRESS NOTE ADULT - SUBJECTIVE AND OBJECTIVE BOX
Gastroenterology progress note:     Patient is a 64y old  Male who presents with a chief complaint of Alcohol intoxication (27 May 2024 10:29)       Admitted on: 05-24-24    We are following the patient for: anemia       Interval History:hb dropping        PAST MEDICAL & SURGICAL HISTORY:  Throat cancer      HTN (hypertension)          MEDICATIONS  (STANDING):  enoxaparin Injectable 40 milliGRAM(s) SubCutaneous every 24 hours  folic acid 1 milliGRAM(s) Oral daily  lactated ringers. 1000 milliLiter(s) (100 mL/Hr) IV Continuous <Continuous>  multivitamin 1 Tablet(s) Oral daily  nicotine - 21 mG/24Hr(s) Patch 1 Patch Transdermal daily  pantoprazole  Injectable 40 milliGRAM(s) IV Push two times a day  thiamine 100 milliGRAM(s) Oral daily    MEDICATIONS  (PRN):  acetaminophen     Tablet .. 650 milliGRAM(s) Oral every 6 hours PRN Temp greater or equal to 38C (100.4F), Mild Pain (1 - 3)  aluminum hydroxide/magnesium hydroxide/simethicone Suspension 30 milliLiter(s) Oral every 4 hours PRN Dyspepsia  LORazepam   Injectable 2 milliGRAM(s) IV Push every 2 hours PRN CIWA-Ar score increase by 2 points and a total score of 7 or less  melatonin 3 milliGRAM(s) Oral at bedtime PRN Insomnia  morphine  IR 15 milliGRAM(s) Oral two times a day PRN Severe Pain (7 - 10)  ondansetron Injectable 4 milliGRAM(s) IV Push every 8 hours PRN Nausea and/or Vomiting      Allergies  No Known Allergies      Review of Systems:   Cardiovascular:  No Chest Pain, No Palpitations  Respiratory:  No Cough, No Dyspnea  Gastrointestinal:  As described in HPI  Skin:  No Skin Lesions, No Jaundice  Neuro:  No Syncope, No Dizziness    Physical Examination:  T(C): 36.8 (05-27-24 @ 05:14), Max: 37.6 (05-26-24 @ 19:49)  HR: 83 (05-27-24 @ 05:14) (83 - 85)  BP: 145/72 (05-27-24 @ 05:14) (128/70 - 145/72)  RR: 18 (05-27-24 @ 08:29) (18 - 18)  SpO2: 99% (05-27-24 @ 08:29) (99% - 99%)      05-26-24 @ 07:01  -  05-27-24 @ 07:00  --------------------------------------------------------  IN: 1100 mL / OUT: 2200 mL / NET: -1100 mL        GENERAL: AAOx3, no acute distress.  HEAD:  Atraumatic, Normocephalic  EYES: conjunctiva and sclera clear  NECK: Supple, no JVD or thyromegaly  CHEST/LUNG: Clear to auscultation bilaterally; No wheeze, rhonchi, or rales  HEART: Regular rate and rhythm; normal S1, S2, No murmurs.  ABDOMEN: Soft,, nondistended; Bowel sounds present  NEUROLOGY: No asterixis or tremor.   SKIN:  no jaundice     Data:                        6.4    4.35  )-----------( 110      ( 27 May 2024 06:40 )             18.4     Hgb trend:  6.4  05-27-24 @ 06:40  7.6  05-26-24 @ 06:55  7.8  05-25-24 @ 07:04  10.3  05-24-24 @ 18:00        05-27    139  |  101  |  21<H>  ----------------------------<  101<H>  4.0   |  30  |  1.3    Ca    8.9      27 May 2024 06:40  Mg     1.5     05-27    TPro  4.8<L>  /  Alb  3.2<L>  /  TBili  0.3  /  DBili  x   /  AST  60<H>  /  ALT  38  /  AlkPhos  70  05-27    Liver panel trend:  TBili 0.3   /   AST 60   /   ALT 38   /   AlkP 70   /   Tptn 4.8   /   Alb 3.2    /   DBili --      05-27  TBili 0.5   /   AST 30   /   ALT 18   /   AlkP 82   /   Tptn 5.3   /   Alb 3.6    /   DBili --      05-26  TBili 0.8   /   AST 24   /   ALT 16   /   AlkP 88   /   Tptn 5.7   /   Alb 3.9    /   DBili --      05-25  TBili 0.6   /   AST 23   /   ALT 16   /   AlkP 90   /   Tptn 5.6   /   Alb 4.0    /   DBili -- 05-25  TBili 0.6   /   AST 30   /   ALT 21   /   AlkP 115   /   Tptn 7.0   /   Alb 4.6    /   DBili -- 05-24     Gastroenterology progress note:     Patient is a 64y old  Male who presents with a chief complaint of Alcohol intoxication (27 May 2024 10:29)       Admitted on: 05-24-24    We are following the patient for: anemia       Interval History:hb dropping        PAST MEDICAL & SURGICAL HISTORY:  Throat cancer      HTN (hypertension)          MEDICATIONS  (STANDING):  enoxaparin Injectable 40 milliGRAM(s) SubCutaneous every 24 hours  folic acid 1 milliGRAM(s) Oral daily  lactated ringers. 1000 milliLiter(s) (100 mL/Hr) IV Continuous <Continuous>  multivitamin 1 Tablet(s) Oral daily  nicotine - 21 mG/24Hr(s) Patch 1 Patch Transdermal daily  pantoprazole  Injectable 40 milliGRAM(s) IV Push two times a day  thiamine 100 milliGRAM(s) Oral daily    MEDICATIONS  (PRN):  acetaminophen     Tablet .. 650 milliGRAM(s) Oral every 6 hours PRN Temp greater or equal to 38C (100.4F), Mild Pain (1 - 3)  aluminum hydroxide/magnesium hydroxide/simethicone Suspension 30 milliLiter(s) Oral every 4 hours PRN Dyspepsia  LORazepam   Injectable 2 milliGRAM(s) IV Push every 2 hours PRN CIWA-Ar score increase by 2 points and a total score of 7 or less  melatonin 3 milliGRAM(s) Oral at bedtime PRN Insomnia  morphine  IR 15 milliGRAM(s) Oral two times a day PRN Severe Pain (7 - 10)  ondansetron Injectable 4 milliGRAM(s) IV Push every 8 hours PRN Nausea and/or Vomiting      Allergies  No Known Allergies      Review of Systems:   Cardiovascular:  No Chest Pain, No Palpitations  Respiratory:  No Cough, No Dyspnea  Gastrointestinal:  soft abdomen  Skin:  No Skin Lesions, No Jaundice  Neuro:  No Syncope, No Dizziness    Physical Examination:  T(C): 36.8 (05-27-24 @ 05:14), Max: 37.6 (05-26-24 @ 19:49)  HR: 83 (05-27-24 @ 05:14) (83 - 85)  BP: 145/72 (05-27-24 @ 05:14) (128/70 - 145/72)  RR: 18 (05-27-24 @ 08:29) (18 - 18)  SpO2: 99% (05-27-24 @ 08:29) (99% - 99%)      05-26-24 @ 07:01  -  05-27-24 @ 07:00  --------------------------------------------------------  IN: 1100 mL / OUT: 2200 mL / NET: -1100 mL        GENERAL: AAOx3, no acute distress.  HEAD:  Atraumatic, Normocephalic  EYES: conjunctiva and sclera clear  NECK: Supple, no JVD or thyromegaly  CHEST/LUNG: Clear to auscultation bilaterally; No wheeze, rhonchi, or rales  HEART: Regular rate and rhythm; normal S1, S2, No murmurs.  ABDOMEN: Soft,, nondistended; Bowel sounds present  NEUROLOGY: No asterixis or tremor.   SKIN:  no jaundice     Data:                        6.4    4.35  )-----------( 110      ( 27 May 2024 06:40 )             18.4     Hgb trend:  6.4  05-27-24 @ 06:40  7.6  05-26-24 @ 06:55  7.8  05-25-24 @ 07:04  10.3  05-24-24 @ 18:00        05-27    139  |  101  |  21<H>  ----------------------------<  101<H>  4.0   |  30  |  1.3    Ca    8.9      27 May 2024 06:40  Mg     1.5     05-27    TPro  4.8<L>  /  Alb  3.2<L>  /  TBili  0.3  /  DBili  x   /  AST  60<H>  /  ALT  38  /  AlkPhos  70  05-27    Liver panel trend:  TBili 0.3   /   AST 60   /   ALT 38   /   AlkP 70   /   Tptn 4.8   /   Alb 3.2    /   DBili --      05-27  TBili 0.5   /   AST 30   /   ALT 18   /   AlkP 82   /   Tptn 5.3   /   Alb 3.6    /   DBili --      05-26  TBili 0.8   /   AST 24   /   ALT 16   /   AlkP 88   /   Tptn 5.7   /   Alb 3.9    /   DBili --      05-25  TBili 0.6   /   AST 23   /   ALT 16   /   AlkP 90   /   Tptn 5.6   /   Alb 4.0    /   DBili -- 05-25  TBili 0.6   /   AST 30   /   ALT 21   /   AlkP 115   /   Tptn 7.0   /   Alb 4.6    /   DBili -- 05-24

## 2024-05-27 NOTE — CHART NOTE - NSCHARTNOTEFT_GEN_A_CORE
Consult received and chart reviewed; patient with history of throat cancer presenting with odynophagia. Discussed with primary team, palliative care will formally evaluate patient Tuesday AM, please call [phone] in the interim for any acute needs. Thank you.   ______________  Richie Grove MD  Palliative Medicine  [facility]   [facility] Pall[facility]  [phone]

## 2024-05-27 NOTE — CONSULT NOTE ADULT - NS ATTEND AMEND GEN_ALL_CORE FT
Patient seen and examined at bedside.    I reviewed and interpreted videoendoscopy images showing no source of bleeding. Post XRT changes.    Recommend CT neck with contrast

## 2024-05-27 NOTE — PROGRESS NOTE ADULT - ASSESSMENT
64-year-old male with history of heavy alcohol use disorder, throat cancer s/p chemo/RT (follows ), chronic PATSY on IV iron/blood transfusions COPD not on home O2, active smoker ( 80 pack years) presents with acute alcohol intoxication. Pt states he has been drinking whiskey around the clock ( 1bottle / day) for the past 2-3 weeks without drinking any water or having any food. He states he was weaned off the morphine/fentanyl for throat pain 2-3 weeks ago and since then he has been drinking alcohol to relieve pain. He admits his last drink was 1 PM on 5/24/24. He endorses feeling nauseous and has symptoms of withdrawal.  Patient denies any abdominal pain. Reports dark black stool this am but no blood.  He reports odynophagia since radiation but tolerating diet currently. Reports taking advil everyday. GI consulted for reported black stool, and chronic anemia.    #Alcohol withdrawal  #Acute on Chronic macrocytic anemia with PATSY  #Reported dark stool r/o upper GI source, likely throat cancer related  #Chronic odynophagia with hx of throat cancer  - Hemodynamically stable & no active bleeding  - Baseline hemoglobin - 8  - Hemoglobin on admission - 10.3>7.8>7.6>6.9  - reviewed Iron studies, receives iron transfusions per patient , follows   - No AC  - NSAID+, used morphine/fentanyl  - CIWA 6 today    #Rec  -  c/w pantoprazole 40 IV BID, transfuse prbc, trend cbc  - Recommend ENT, onc eval  - offered EGD/Colonoscopy once optimized, patient prefers  for anemia work up  - Please give bowel regimen while on opioids  - Maintain active Type and screen  - Trend H&H BID  - Please place x2 18G IVs  - Please target Hb  >7  - Please avoid any NSAIDs  - If any unstable bleed, please call GI stat  - CIWA protocol, Mg,thiamine,folate supplementation  - alcohol/smoking cessation advised, CATCH consult   64-year-old male with history of heavy alcohol use disorder, throat cancer s/p chemo/RT (follows ), chronic PATSY on IV iron/blood transfusions COPD not on home O2, active smoker ( 80 pack years) presents with acute alcohol intoxication. Pt states he has been drinking whiskey around the clock ( 1bottle / day) for the past 2-3 weeks without drinking any water or having any food. He states he was weaned off the morphine/fentanyl for throat pain 2-3 weeks ago and since then he has been drinking alcohol to relieve pain. He admits his last drink was 1 PM on 5/24/24. He endorses feeling nauseous and has symptoms of withdrawal.  Patient denies any abdominal pain. Reports dark black stool this am but no blood.  He reports odynophagia since radiation but tolerating diet currently. Reports taking advil everyday. GI consulted for reported black stool, and chronic anemia.    #Alcohol withdrawal  #Acute on Chronic macrocytic anemia with PATSY  #Reported dark stool r/o upper GI source, likely throat cancer related  #Chronic odynophagia with hx of throat cancer  - Hemodynamically stable & no active bleeding  - Baseline hemoglobin - 8  - Hemoglobin on admission - 10.3>7.8>7.6>6.9  - reviewed Iron studies, receives iron transfusions per patient , follows   - No AC  - NSAID+, used morphine/fentanyl  - CIWA 6 today    #Rec  -  c/w pantoprazole 40 IV BID, transfuse prbc, trend cbc  - Recommend ENT, onc eval  - offered EGD/Colonoscopy once optimized,but refusing as he would like to follow with /José Miguel  - Please give bowel regimen while on opioids  - Maintain active Type and screen  - Trend H&H BID  - Please place x2 18G IVs  - Please target Hb  >7  - Please avoid any NSAIDs  - If any unstable bleed, please call GI stat  - CIWA protocol, Mg,thiamine,folate supplementation  - alcohol/smoking cessation advised, CATCH consult  - recall us PRN

## 2024-05-27 NOTE — CONSULT NOTE ADULT - ASSESSMENT
Pt is a 63yo Male who presents with alcohol intoxication/withdrawal, melena - called to assess due to h/o throat cancer. Pt s/p CCRT for "throat" cancer November 2023 - new throat pain, no other new symptoms. No change in voice or wt loss. FFL at bedside shows + post radiation edema noted to the epiglottis and arytenoids b/l. + edema/fullness noted to the posterior cricoid space, no overt masses noted. No bleeding or old blood noted in larynx.    ·	consider trialing Decadron x 1 dose for pain given edema noted on FFL  ·	PPI  ·	palliative consult for pain mgmt longterm  ·	will discuss role for CT neck with contrast with attng  ·	discussed smoking cessation with patient  ·	GI following - melena not likely from a head & neck source.  ·	pt no longer wishes to follow with Dr Kauffman or Dr Daly - pt interested in establishing care here, can f/u with Dr Collado upon D/C home  ·	w/d with attng

## 2024-05-27 NOTE — CONSULT NOTE ADULT - SUBJECTIVE AND OBJECTIVE BOX
Pt is a 65yo Male who presents with alcohol intoxication/withdrawal, melena - called to assess due to h/o throat cancer. PT seen and examined at bedside, states he had "throat" cancer with enlarged lymph nodes on both sides last year - does not recall where in his throat. PT followed with Dr Kauffman (ENT) and Dr Aviles (onc) at the time. Pt completed full course of CCRT in November 2023. Pt went for a second opinion with Dr Daly (ENT, [facility]) - no longer follows with an ENT. Pt still follows with Dr Aviles, states he had a PET scan done early this year that was negative. Pt was on pain medication for post radiation symptoms, stopped taking medication and began drinking. Pt states prior to this, he has been able to take PO solid and liquid, no change in diet or weight loss. Pt states he has new pain in his throat and feels like he constantly has dripping/PND, throat clearing. Denies any acute change to his voice. No SOB.     PAST MEDICAL & SURGICAL HISTORY:  Throat cancer  HTN (hypertension)    MEDICATIONS  (STANDING):  enoxaparin Injectable 40 milliGRAM(s) SubCutaneous every 24 hours  folic acid 1 milliGRAM(s) Oral daily  lactated ringers. 1000 milliLiter(s) (100 mL/Hr) IV Continuous <Continuous>  multivitamin 1 Tablet(s) Oral daily  nicotine - 21 mG/24Hr(s) Patch 1 Patch Transdermal daily  pantoprazole  Injectable 40 milliGRAM(s) IV Push two times a day  thiamine 100 milliGRAM(s) Oral daily    MEDICATIONS  (PRN):  acetaminophen     Tablet .. 650 milliGRAM(s) Oral every 6 hours PRN Temp greater or equal to 38C (100.4F), Mild Pain (1 - 3)  aluminum hydroxide/magnesium hydroxide/simethicone Suspension 30 milliLiter(s) Oral every 4 hours PRN Dyspepsia  benzocaine 20% Spray 1 Spray(s) Topical three times a day PRN per patient request  LORazepam   Injectable 2 milliGRAM(s) IV Push every 2 hours PRN CIWA-Ar score increase by 2 points and a total score of 7 or less  melatonin 3 milliGRAM(s) Oral at bedtime PRN Insomnia  morphine  IR 15 milliGRAM(s) Oral two times a day PRN Severe Pain (7 - 10)  ondansetron Injectable 4 milliGRAM(s) IV Push every 8 hours PRN Nausea and/or Vomiting    Allergies    No Known Allergies      SOCIAL HISTORY:  + smoking, 80 pack yr hx  + ETOH abuse      REVIEW OF SYSTEMS   [x] A ten-point review of systems was otherwise negative except as noted.    Vital Signs Last 24 Hrs  T(C): 37.2 (27 May 2024 12:37), Max: 37.6 (26 May 2024 19:49)  T(F): 99 (27 May 2024 12:37), Max: 99.6 (26 May 2024 19:49)  HR: 85 (27 May 2024 12:37) (83 - 85)  BP: 144/55 (27 May 2024 12:37) (128/70 - 145/72)  RR: 18 (27 May 2024 12:37) (18 - 18)  SpO2: 99% (27 May 2024 12:37) (99% - 99%)    Parameters below as of 27 May 2024 12:37  Patient On (Oxygen Delivery Method): room air      GEN: NAD, awake and alert. No drooling or pooling of secretions. No stridor or stertor. +hoarseness.   SKIN: Good color, non diaphoretic.  HEENT: Oral mucosa pink and moist. No erythema or edema noted to buccal mucosa, tongue, FOM, uvula or posterior oropharynx. Uvula midline.   NECK: Trachea midline, Neck supple, no TTP to B/L lateral neck, no cervical LAD. post radiated neck skin changes, mild.   RESP: No dyspnea, non-labored breathing. No use of accessory muscles.   CARDIO: +S1/S2  ABDO: Soft, NT.  EXT: GONZALEZ x 4    Fiberoptic Laryngoscopy: No masses or lesions noted to NP/OP/HP. Laryngeal structures intact, + post radiation edema noted to the epiglottis and arytenoids b/l. + edema/fullness noted to the posterior cricoid space. TVC/FVC mobile and intact, no glottic gap noted. No bleeding or old blood noted in larynx.    LABS:                        6.4    4.35  )-----------( 110      ( 27 May 2024 06:40 )             18.4     05-27    139  |  101  |  21<H>  ----------------------------<  101<H>  4.0   |  30  |  1.3    Ca    8.9      27 May 2024 06:40  Mg     1.5     05-27    TPro  4.8<L>  /  Alb  3.2<L>  /  TBili  0.3  /  DBili  x   /  AST  60<H>  /  ALT  38  /  AlkPhos  70  05-27      RADIOLOGY & ADDITIONAL STUDIES:  No pertinent imaging.

## 2024-05-27 NOTE — PROGRESS NOTE ADULT - SUBJECTIVE AND OBJECTIVE BOX
KRAIG DENNIS  64y Male    CHIEF COMPLAINT:    Patient is a 64y old  Male who presents with a chief complaint of Alcohol intoxication (26 May 2024 14:53)      INTERVAL HPI/OVERNIGHT EVENTS:    Patient seen and examined. Still having melena and drop in Hb. Still shaking.     ROS: All other systems are negative.    Vital Signs:    T(F): 98.3 (24 @ 05:14), Max: 99.6 (24 @ 19:49)  HR: 83 (24 @ 05:14) (83 - 85)  BP: 145/72 (24 @ 05:14) (128/70 - 165/67)  RR: 18 (24 @ 08:29) (18 - 18)  SpO2: 99% (24 @ 08:29) (99% - 99%)  I&O's Summary    26 May 2024 07:01  -  27 May 2024 07:00  --------------------------------------------------------  IN: 1100 mL / OUT: 2200 mL / NET: -1100 mL      Daily     Daily Weight in k.9 (27 May 2024 05:14)  CAPILLARY BLOOD GLUCOSE          PHYSICAL EXAM:    GENERAL:  NAD  SKIN: No rashes or lesions  HENT: Atraumatic. Normocephalic. PERRL. Moist membranes.  NECK: Supple, No JVD. No lymphadenopathy.  PULMONARY: CTA B/L. No wheezing. No rales  CVS: Normal S1, S2. Rate and Rhythm are regular. No murmurs.  ABDOMEN/GI: Soft, Nontender, Nondistended; BS present  EXTREMITIES: Peripheral pulses intact. No edema B/L LE.  NEUROLOGIC:  No motor or sensory deficit.  PSYCH: Alert & oriented x 3    Consultant(s) Notes Reviewed:  [x ] YES  [ ] NO  Care Discussed with Consultants/Other Providers [ x] YES  [ ] NO    EKG reviewed  Telemetry reviewed    LABS:                        6.4    4.35  )-----------( 110      ( 27 May 2024 06:40 )             18.4   Hemoglobin: 6.4 g/dL ( @ 06:40)  Hemoglobin: 7.6 g/dL ( @ 06:55)  Hemoglobin: 7.8 g/dL ( @ 07:04)  Hemoglobin: 10.3 g/dL ( @ 18:00)        139  |  101  |  21<H>  ----------------------------<  101<H>  4.0   |  30  |  1.3    Ca    8.9      27 May 2024 06:40  Mg     1.5         TPro  4.8<L>  /  Alb  3.2<L>  /  TBili  0.3  /  DBili  x   /  AST  60<H>  /  ALT  38  /  AlkPhos  70                RADIOLOGY & ADDITIONAL TESTS:      Imaging or report Personally Reviewed:  [ ] YES  [ ] NO    Medications:  Standing  enoxaparin Injectable 40 milliGRAM(s) SubCutaneous every 24 hours  folic acid 1 milliGRAM(s) Oral daily  lactated ringers. 1000 milliLiter(s) IV Continuous <Continuous>  magnesium sulfate  IVPB 2 Gram(s) IV Intermittent every 2 hours  multivitamin 1 Tablet(s) Oral daily  nicotine - 21 mG/24Hr(s) Patch 1 Patch Transdermal daily  pantoprazole  Injectable 40 milliGRAM(s) IV Push two times a day  thiamine 100 milliGRAM(s) Oral daily    PRN Meds  acetaminophen     Tablet .. 650 milliGRAM(s) Oral every 6 hours PRN  aluminum hydroxide/magnesium hydroxide/simethicone Suspension 30 milliLiter(s) Oral every 4 hours PRN  LORazepam   Injectable 2 milliGRAM(s) IV Push every 2 hours PRN  melatonin 3 milliGRAM(s) Oral at bedtime PRN  ondansetron Injectable 4 milliGRAM(s) IV Push every 8 hours PRN      Case discussed with resident    Care discussed with pt/family

## 2024-05-28 DIAGNOSIS — C14.0 MALIGNANT NEOPLASM OF PHARYNX, UNSPECIFIED: ICD-10-CM

## 2024-05-28 DIAGNOSIS — F10.939 ALCOHOL USE, UNSPECIFIED WITH WITHDRAWAL, UNSPECIFIED: ICD-10-CM

## 2024-05-28 DIAGNOSIS — R07.0 PAIN IN THROAT: ICD-10-CM

## 2024-05-28 DIAGNOSIS — Z51.5 ENCOUNTER FOR PALLIATIVE CARE: ICD-10-CM

## 2024-05-28 LAB
ALBUMIN SERPL ELPH-MCNC: 3.4 G/DL — LOW (ref 3.5–5.2)
ALP SERPL-CCNC: 74 U/L — SIGNIFICANT CHANGE UP (ref 30–115)
ALT FLD-CCNC: 84 U/L — HIGH (ref 0–41)
ANION GAP SERPL CALC-SCNC: 8 MMOL/L — SIGNIFICANT CHANGE UP (ref 7–14)
APPEARANCE UR: CLEAR — SIGNIFICANT CHANGE UP
AST SERPL-CCNC: 109 U/L — HIGH (ref 0–41)
BASOPHILS # BLD AUTO: 0.03 K/UL — SIGNIFICANT CHANGE UP (ref 0–0.2)
BASOPHILS NFR BLD AUTO: 0.6 % — SIGNIFICANT CHANGE UP (ref 0–1)
BILIRUB SERPL-MCNC: 0.4 MG/DL — SIGNIFICANT CHANGE UP (ref 0.2–1.2)
BILIRUB UR-MCNC: NEGATIVE — SIGNIFICANT CHANGE UP
BUN SERPL-MCNC: 18 MG/DL — SIGNIFICANT CHANGE UP (ref 10–20)
CALCIUM SERPL-MCNC: 8.9 MG/DL — SIGNIFICANT CHANGE UP (ref 8.4–10.5)
CHLORIDE SERPL-SCNC: 100 MMOL/L — SIGNIFICANT CHANGE UP (ref 98–110)
CO2 SERPL-SCNC: 30 MMOL/L — SIGNIFICANT CHANGE UP (ref 17–32)
COLOR SPEC: YELLOW — SIGNIFICANT CHANGE UP
CREAT ?TM UR-MCNC: 51 MG/DL — SIGNIFICANT CHANGE UP
CREAT SERPL-MCNC: 1.4 MG/DL — SIGNIFICANT CHANGE UP (ref 0.7–1.5)
DIFF PNL FLD: NEGATIVE — SIGNIFICANT CHANGE UP
EGFR: 56 ML/MIN/1.73M2 — LOW
EOSINOPHIL # BLD AUTO: 0.07 K/UL — SIGNIFICANT CHANGE UP (ref 0–0.7)
EOSINOPHIL NFR BLD AUTO: 1.5 % — SIGNIFICANT CHANGE UP (ref 0–8)
GLUCOSE SERPL-MCNC: 97 MG/DL — SIGNIFICANT CHANGE UP (ref 70–99)
GLUCOSE UR QL: NEGATIVE MG/DL — SIGNIFICANT CHANGE UP
HCT VFR BLD CALC: 23.8 % — LOW (ref 42–52)
HGB BLD-MCNC: 8.3 G/DL — LOW (ref 14–18)
IMM GRANULOCYTES NFR BLD AUTO: 4.5 % — HIGH (ref 0.1–0.3)
KETONES UR-MCNC: NEGATIVE MG/DL — SIGNIFICANT CHANGE UP
LEUKOCYTE ESTERASE UR-ACNC: NEGATIVE — SIGNIFICANT CHANGE UP
LYMPHOCYTES # BLD AUTO: 0.34 K/UL — LOW (ref 1.2–3.4)
LYMPHOCYTES # BLD AUTO: 7.3 % — LOW (ref 20.5–51.1)
MAGNESIUM SERPL-MCNC: 3 MG/DL — HIGH (ref 1.8–2.4)
MCHC RBC-ENTMCNC: 34.9 G/DL — SIGNIFICANT CHANGE UP (ref 32–37)
MCHC RBC-ENTMCNC: 35.8 PG — HIGH (ref 27–31)
MCV RBC AUTO: 102.6 FL — HIGH (ref 80–94)
MONOCYTES # BLD AUTO: 0.62 K/UL — HIGH (ref 0.1–0.6)
MONOCYTES NFR BLD AUTO: 13.3 % — HIGH (ref 1.7–9.3)
NEUTROPHILS # BLD AUTO: 3.38 K/UL — SIGNIFICANT CHANGE UP (ref 1.4–6.5)
NEUTROPHILS NFR BLD AUTO: 72.8 % — SIGNIFICANT CHANGE UP (ref 42.2–75.2)
NITRITE UR-MCNC: NEGATIVE — SIGNIFICANT CHANGE UP
NRBC # BLD: 0 /100 WBCS — SIGNIFICANT CHANGE UP (ref 0–0)
OSMOLALITY UR: 337 MOS/KG — SIGNIFICANT CHANGE UP (ref 50–1200)
PH UR: 7 — SIGNIFICANT CHANGE UP (ref 5–8)
PLATELET # BLD AUTO: 120 K/UL — LOW (ref 130–400)
PMV BLD: 9.4 FL — SIGNIFICANT CHANGE UP (ref 7.4–10.4)
POTASSIUM SERPL-MCNC: 3.9 MMOL/L — SIGNIFICANT CHANGE UP (ref 3.5–5)
POTASSIUM SERPL-SCNC: 3.9 MMOL/L — SIGNIFICANT CHANGE UP (ref 3.5–5)
PROT SERPL-MCNC: 4.9 G/DL — LOW (ref 6–8)
PROT UR-MCNC: NEGATIVE MG/DL — SIGNIFICANT CHANGE UP
RBC # BLD: 2.32 M/UL — LOW (ref 4.7–6.1)
RBC # FLD: 17.7 % — HIGH (ref 11.5–14.5)
SODIUM SERPL-SCNC: 138 MMOL/L — SIGNIFICANT CHANGE UP (ref 135–146)
SODIUM UR-SCNC: 87 MMOL/L — SIGNIFICANT CHANGE UP
SP GR SPEC: 1.02 — SIGNIFICANT CHANGE UP (ref 1–1.03)
UROBILINOGEN FLD QL: 1 MG/DL — SIGNIFICANT CHANGE UP (ref 0.2–1)
WBC # BLD: 4.65 K/UL — LOW (ref 4.8–10.8)
WBC # FLD AUTO: 4.65 K/UL — LOW (ref 4.8–10.8)

## 2024-05-28 PROCEDURE — 99223 1ST HOSP IP/OBS HIGH 75: CPT

## 2024-05-28 PROCEDURE — 70491 CT SOFT TISSUE NECK W/DYE: CPT | Mod: 26

## 2024-05-28 PROCEDURE — 99233 SBSQ HOSP IP/OBS HIGH 50: CPT

## 2024-05-28 RX ORDER — ONDANSETRON 8 MG/1
4 TABLET, FILM COATED ORAL THREE TIMES A DAY
Refills: 0 | Status: DISCONTINUED | OUTPATIENT
Start: 2024-05-28 | End: 2024-05-28

## 2024-05-28 RX ORDER — MORPHINE SULFATE 50 MG/1
10 CAPSULE, EXTENDED RELEASE ORAL EVERY 4 HOURS
Refills: 0 | Status: DISCONTINUED | OUTPATIENT
Start: 2024-05-28 | End: 2024-05-31

## 2024-05-28 RX ORDER — SENNA PLUS 8.6 MG/1
2 TABLET ORAL AT BEDTIME
Refills: 0 | Status: DISCONTINUED | OUTPATIENT
Start: 2024-05-28 | End: 2024-05-31

## 2024-05-28 RX ORDER — POLYETHYLENE GLYCOL 3350 17 G/17G
17 POWDER, FOR SOLUTION ORAL DAILY
Refills: 0 | Status: DISCONTINUED | OUTPATIENT
Start: 2024-05-28 | End: 2024-05-29

## 2024-05-28 RX ORDER — ONDANSETRON 8 MG/1
8 TABLET, FILM COATED ORAL ONCE
Refills: 0 | Status: COMPLETED | OUTPATIENT
Start: 2024-05-28 | End: 2024-05-28

## 2024-05-28 RX ORDER — DEXAMETHASONE 0.5 MG/5ML
6 ELIXIR ORAL ONCE
Refills: 0 | Status: COMPLETED | OUTPATIENT
Start: 2024-05-28 | End: 2024-05-28

## 2024-05-28 RX ADMIN — MORPHINE SULFATE 10 MILLIGRAM(S): 50 CAPSULE, EXTENDED RELEASE ORAL at 22:53

## 2024-05-28 RX ADMIN — Medication 6 MILLIGRAM(S): at 09:45

## 2024-05-28 RX ADMIN — ONDANSETRON 8 MILLIGRAM(S): 8 TABLET, FILM COATED ORAL at 09:44

## 2024-05-28 RX ADMIN — SODIUM CHLORIDE 100 MILLILITER(S): 9 INJECTION, SOLUTION INTRAVENOUS at 05:29

## 2024-05-28 RX ADMIN — MORPHINE SULFATE 10 MILLIGRAM(S): 50 CAPSULE, EXTENDED RELEASE ORAL at 23:23

## 2024-05-28 RX ADMIN — Medication 1 PATCH: at 19:00

## 2024-05-28 RX ADMIN — Medication 1 MILLIGRAM(S): at 11:21

## 2024-05-28 RX ADMIN — Medication 1 PATCH: at 11:20

## 2024-05-28 RX ADMIN — PANTOPRAZOLE SODIUM 40 MILLIGRAM(S): 20 TABLET, DELAYED RELEASE ORAL at 17:17

## 2024-05-28 RX ADMIN — Medication 100 MILLIGRAM(S): at 11:20

## 2024-05-28 RX ADMIN — Medication 2 MILLIGRAM(S): at 02:51

## 2024-05-28 RX ADMIN — Medication 1 TABLET(S): at 11:21

## 2024-05-28 RX ADMIN — Medication 2 MILLIGRAM(S): at 09:44

## 2024-05-28 RX ADMIN — ENOXAPARIN SODIUM 40 MILLIGRAM(S): 100 INJECTION SUBCUTANEOUS at 05:15

## 2024-05-28 RX ADMIN — Medication 1 PATCH: at 11:24

## 2024-05-28 RX ADMIN — PANTOPRAZOLE SODIUM 40 MILLIGRAM(S): 20 TABLET, DELAYED RELEASE ORAL at 05:15

## 2024-05-28 NOTE — PROGRESS NOTE ADULT - ASSESSMENT
64-year-old male with history of alcohol use disorder, throat cancer in remission, COPD not on home O2, active smoker ( 80 pack years) presents with acute alcohol intoxication. Pt states he has been drinking whiskey around the clock ( 4-6 glasses/ day) for the past 2-3 weeks without drinking any water or having any food. He states he was weaned off the morphine ( which was prescibed to him for throat pain) 2-3 weeks ago and since then he has been drinking alcohol to relieve pain.    Alcohol withdrawal   Alcohol use disorder  Starvation ketosis  SERVANDO  Hyponatremia, resolved  COPD  Tobacco use  H/O throat Ca   Anemia              PLAN:    ·	Tele reviewed. No events  ·	EKG on admission: NSR 90/min (Interpreted by me)  ·	Alcohol level is <10  ·	CIWA score is 6 today  ·	Ativan 2 mg ivp q 2h prn for withdrawal.   ·	Cont  cc/hr  ·	Drop in Hb and having melena. Hb is 6.4. Transfuse one unit of PRBC's  ·	Monitor H/H and keep Hb >7  ·	Cont Protonix IV 40 mg q 12h  ·	GI consult noted. Recommended ENT and Hem/Onc eval for throat cancer  ·	ENT eval noted. FFL at bedside shows + post radiation edema noted to the epiglottis and arytenoids b/l. + edema/fullness noted to the posterior cricoid space, no overt masses noted. No bleeding or old blood noted in larynx  ·	Stool guaiac. Serum iron is 63 and percent sat is 34  ·	Cont MVI, Thiamine and Folic acid.   ·	Monitor electrolytes. Replete Mg  ·	CATCH team consult  ·	SERVANDO, monitor renal function. Likely prerenal.   ·	Tobacco use. Started him on Nicotine patch  ·	PT eval    Progress Note Handoff    Pending (specify):  Consults__CATCH team______, Tests________, Test Results_______, Other_Alcohol withdrawal. ________  Family discussion:  Disposition: Home___/SNF___/Other________/Unknown at this time________    Blake Franco MD  Spectra: [id number]         64-year-old male with history of alcohol use disorder, throat cancer in remission, COPD not on home O2, active smoker ( 80 pack years) presents with acute alcohol intoxication. Pt states he has been drinking whiskey around the clock ( 4-6 glasses/ day) for the past 2-3 weeks without drinking any water or having any food. He states he was weaned off the morphine ( which was prescibed to him for throat pain) 2-3 weeks ago and since then he has been drinking alcohol to relieve pain.    Alcohol withdrawal   Alcohol use disorder  Starvation ketosis  SERVANDO  Hyponatremia, resolved  COPD  Tobacco use  H/O throat Ca   Anemia              PLAN:    ·	Tele reviewed. No events  ·	EKG on admission: NSR 90/min (Interpreted by me)  ·	Alcohol level is <10  ·	CIWA score zero today  ·	Ativan 2 mg ivp q 2h prn for withdrawal.   ·	S/P one unit of PRBC's. H/H is stable  ·	Monitor H/H and keep Hb >7  ·	Cont Protonix IV 40 mg q 12h  ·	GI f/u. Pt agrees with in patient EGD/Colonoscopy  ·	ENT eval noted. FFL at bedside shows + post radiation edema noted to the epiglottis and arytenoids b/l. + edema/fullness noted to the posterior cricoid space, no overt masses noted. No bleeding or old blood noted in larynx  ·	Recommended CT neck with IV contrast for further eval  ·	Stool guaiac. Serum iron is 63 and percent sat is 34  ·	Cont MVI, Thiamine and Folic acid.   ·	Monitor electrolytes.   ·	CATCH team consult  ·	Labs noted. Cr is stable  ·	Tobacco use. Started him on Nicotine patch  ·	PT eval    Progress Note Handoff    Pending (specify):  Consults__CATCH team, GI f/u______, Tests________, Test Results_______, Other GI bleed ________  Family discussion:  Disposition: Home___/SNF___/Other________/Unknown at this time________    Blake Franco MD  Spectra: [phone]

## 2024-05-28 NOTE — PROGRESS NOTE ADULT - SUBJECTIVE AND OBJECTIVE BOX
Gastroenterology progress note:     Patient is a 64y old  Male who presents with a chief complaint of Alcohol intoxication (28 May 2024 16:04)       Admitted on: 05-24-24    We are following the patient for: anemia       Interval History: s/p ENT eval    PAST MEDICAL & SURGICAL HISTORY:  Throat cancer      HTN (hypertension)          MEDICATIONS  (STANDING):  enoxaparin Injectable 40 milliGRAM(s) SubCutaneous every 24 hours  folic acid 1 milliGRAM(s) Oral daily  multivitamin 1 Tablet(s) Oral daily  nicotine - 21 mG/24Hr(s) Patch 1 Patch Transdermal daily  pantoprazole  Injectable 40 milliGRAM(s) IV Push two times a day  polyethylene glycol 3350 17 Gram(s) Oral daily  senna 2 Tablet(s) Oral at bedtime  thiamine 100 milliGRAM(s) Oral daily    MEDICATIONS  (PRN):  acetaminophen     Tablet .. 650 milliGRAM(s) Oral every 6 hours PRN Temp greater or equal to 38C (100.4F), Mild Pain (1 - 3)  aluminum hydroxide/magnesium hydroxide/simethicone Suspension 30 milliLiter(s) Oral every 4 hours PRN Dyspepsia  benzocaine 20% Spray 1 Spray(s) Topical three times a day PRN per patient request  LORazepam   Injectable 2 milliGRAM(s) IV Push every 2 hours PRN CIWA-Ar score increase by 2 points and a total score of 7 or less  melatonin 3 milliGRAM(s) Oral at bedtime PRN Insomnia  morphine Concentrate 10 milliGRAM(s) Oral every 4 hours PRN Moderate Pain (4 - 6)  ondansetron Injectable 4 milliGRAM(s) IV Push every 8 hours PRN Nausea and/or Vomiting      Allergies  No Known Allergies      Review of Systems:   Cardiovascular:  No Chest Pain, No Palpitations  Respiratory:  No Cough, No Dyspnea  Gastrointestinal:  As described in HPI  Skin:  No Skin Lesions, No Jaundice  Neuro:  No Syncope, No Dizziness    Physical Examination:  T(C): 36.5 (05-28-24 @ 12:34), Max: 36.9 (05-27-24 @ 19:00)  HR: 75 (05-28-24 @ 12:34) (75 - 80)  BP: 134/76 (05-28-24 @ 12:34) (134/76 - 166/72)  RR: 18 (05-28-24 @ 12:34) (17 - 18)  SpO2: 99% (05-28-24 @ 04:45) (99% - 99%)      05-27-24 @ 07:01  -  05-28-24 @ 07:00  --------------------------------------------------------  IN: 2710 mL / OUT: 2050 mL / NET: 660 mL    05-28-24 @ 07:01  -  05-28-24 @ 17:42  --------------------------------------------------------  IN: 0 mL / OUT: 400 mL / NET: -400 mL        GENERAL: AAOx3, no acute distress.  HEAD:  Atraumatic, Normocephalic  EYES: conjunctiva and sclera clear  NECK: Supple, no JVD or thyromegaly  CHEST/LUNG: Clear to auscultation bilaterally; No wheeze, rhonchi, or rales  HEART: Regular rate and rhythm; normal S1, S2, No murmurs.  ABDOMEN: Soft, nontender, nondistended; Bowel sounds present  NEUROLOGY: No asterixis or tremor.   SKIN: Intact, no jaundice     Data:                        8.3    4.65  )-----------( 120      ( 28 May 2024 06:04 )             23.8     Hgb trend:  8.3  05-28-24 @ 06:04  8.0  05-27-24 @ 21:58  6.4  05-27-24 @ 06:40  7.6  05-26-24 @ 06:55      05-27-24 @ 07:01  -  05-28-24 @ 07:00  --------------------------------------------------------  IN: 310 mL      05-28    138  |  100  |  18  ----------------------------<  97  3.9   |  30  |  1.4    Ca    8.9      28 May 2024 06:04  Mg     3.0     05-28    TPro  4.9<L>  /  Alb  3.4<L>  /  TBili  0.4  /  DBili  x   /  AST  109<H>  /  ALT  84<H>  /  AlkPhos  74  05-28    Liver panel trend:  TBili 0.4   /      /   ALT 84   /   AlkP 74   /   Tptn 4.9   /   Alb 3.4    /   DBili --      05-28  TBili 0.3   /   AST 60   /   ALT 38   /   AlkP 70   /   Tptn 4.8   /   Alb 3.2    /   DBili --      05-27  TBili 0.5   /   AST 30   /   ALT 18   /   AlkP 82   /   Tptn 5.3   /   Alb 3.6    /   DBili --      05-26  TBili 0.8   /   AST 24   /   ALT 16   /   AlkP 88   /   Tptn 5.7   /   Alb 3.9    /   DBili --      05-25  TBili 0.6   /   AST 23   /   ALT 16   /   AlkP 90   /   Tptn 5.6   /   Alb 4.0    /   DBili --      05-25  TBili 0.6   /   AST 30   /   ALT 21   /   AlkP 115   /   Tptn 7.0   /   Alb 4.6    /   DBili --      05-24

## 2024-05-28 NOTE — PROGRESS NOTE ADULT - ASSESSMENT
64-year-old male with history of alcohol use disorder, throat cancer in remission, COPD not on home O2, smoker ( 80 pack years) presents with acute alcohol intoxication. Pt wanted to go to rehab today but was told to come to ED instead. He admits his last drink was 4 hours prior to ED arrival. He endorses feeling nauseas.  Patient admits he has not had anything to eat for the last 10 days.  Patient denies any trauma falls numbness tingling weakness vomiting. Denies history of alcohol withdrawal seizure.     #H/O throat Ca in remission  #melena  #anemia  - s/p 1 prbc 5/27  - active t/s  - protonix bid   - gi consulted, patient 5/28 changed his mind and now agreeable to in-patient egd / colonoscopy  - ENT consulted. Video FFL- post-radiation changed & edema. CT neck w/ contrast for further evaluation ordered  - onc consulted  - palliative consulted     #alcohol withdrawal   #alcohol use disorder   #starvation ketosis   - c/w CIWA protocol  - CATCH team evaluation   - Zofran PRN   - beta hydroxy butyrate neg   - Cont MVI, Thiamine and Folic acid.     #Macrocytic anemia  #Suspected thiamine deficiency   #Folic acid deficiency    - likely 2/2 chronic alcohol use   - s/p IV thiamine 500 mg   - c/w thiamine  mg daily   - c/w Folic acid supplement  - c/w daily vitamins   - Vitamin B12 wnl  - b9 low at 3.4    #SERVANDO   #Hyponatremia - resolved   - likely prerenal   - check urine studies   - serum osm 327    #transaminitis  - monitor for now  - consider ruq us    #COPD not on home o2  #Not in exacerbation  #Active smoker ( 80 pack year smoking hx)  - Not on any inhalers at home   - start albuterol PRN     #DVT PPx: Lovenox  #GI Ppx: PPI  #Activity: IAT  #Diet: Regular

## 2024-05-28 NOTE — PROGRESS NOTE ADULT - ASSESSMENT
Pt is a 64y Male with PMH including throat ca s/p CCRT p/w melena a/w melena & ETOH withdrawal - ENT following for throat pain.    PLAN:  -Pt seen with Dr. Olson  -Video FFL reviewed - post-radiation changed & edema  -Recommend CT neck w/ contrast for further evaluation  -Decadron x 1  -PPI BID  -Remainder of care per primary team

## 2024-05-28 NOTE — CONSULT NOTE ADULT - ASSESSMENT
64-year-old male with history of alcohol use disorder, throat cancer in remission, COPD not on home O2, active smoker ( 80 pack years) presents with acute alcohol intoxication. Pt states he has been drinking whiskey around the clock ( 4-6 glasses/ day) for the past 2-3 weeks without drinking any water or having any food. He states he was weaned off the morphine ( which was prescibed to him for throat pain) 2-3 weeks ago and since then he has been drinking alcohol to relieve pain. He today realized he is reaching to the point of no return and  wanted to go to rehab but was told to come to ED instead. He admits his last drink was 1 PM on 5/24/24. He endorses feeling nauseous.  Patient denies any trauma falls numbness tingling weakness vomiting. Denies history of alcohol withdrawal seizure. Denies any other drug use.     Patient seen by ENT, FFL showing post-radiation changes and edema. Patient states that morphine is helpful and most recent prescription prior to admission was for PO morphine solution 10mg    Plan:  - start roxanol solution 10mg q4h PRN for pain  - agree with senna and miralax  - agree with CT neck    Discussed with primary team.     Palliative care will continue to follow.   Please call [phone] with questions or concerns 24/7.   _____________  Richie Grove MD  Palliative Medicine  [facility]  [phone]

## 2024-05-28 NOTE — PROGRESS NOTE ADULT - SUBJECTIVE AND OBJECTIVE BOX
ENT DAILY PROGRESS NOTE    Pt is a 64y Male with PMH including throat ca s/p CCRT p/w melena a/w melena & ETOH withdrawal - ENT following for throat pain. Pt seen this AM with Dr. Olson, reports throat pain to be improved with pain regimen.     REVIEW OF SYSTEMS   [x] A ten-point review of systems was otherwise negative except as noted.    Allergies  No Known Allergies    MEDICATIONS:  acetaminophen     Tablet .. 650 milliGRAM(s) Oral every 6 hours PRN  aluminum hydroxide/magnesium hydroxide/simethicone Suspension 30 milliLiter(s) Oral every 4 hours PRN  benzocaine 20% Spray 1 Spray(s) Topical three times a day PRN  dexAMETHasone  Injectable 6 milliGRAM(s) IV Push once  enoxaparin Injectable 40 milliGRAM(s) SubCutaneous every 24 hours  folic acid 1 milliGRAM(s) Oral daily  lactated ringers. 1000 milliLiter(s) IV Continuous <Continuous>  LORazepam   Injectable 2 milliGRAM(s) IV Push every 2 hours PRN  melatonin 3 milliGRAM(s) Oral at bedtime PRN  morphine  IR 15 milliGRAM(s) Oral two times a day PRN  multivitamin 1 Tablet(s) Oral daily  nicotine - 21 mG/24Hr(s) Patch 1 Patch Transdermal daily  ondansetron   Disintegrating Tablet 8 milliGRAM(s) Oral once  ondansetron   Disintegrating Tablet 4 milliGRAM(s) Oral three times a day PRN  ondansetron Injectable 4 milliGRAM(s) IV Push every 8 hours PRN  pantoprazole  Injectable 40 milliGRAM(s) IV Push two times a day  thiamine 100 milliGRAM(s) Oral daily    Vital Signs Last 24 Hrs  T(C): 36.4 (28 May 2024 04:45), Max: 37.2 (27 May 2024 12:37)  T(F): 97.6 (28 May 2024 04:45), Max: 99 (27 May 2024 12:37)  HR: 80 (28 May 2024 04:45) (69 - 85)  BP: 136/72 (28 May 2024 04:45) (136/72 - 166/72)  BP(mean): 103 (27 May 2024 18:32) (103 - 103)  RR: 18 (28 May 2024 04:45) (17 - 18)  SpO2: 99% (28 May 2024 04:45) (99% - 99%)    Parameters below as of 27 May 2024 12:37  Patient On (Oxygen Delivery Method): room air    05-27 @ 07:01  -  05-28 @ 07:00  --------------------------------------------------------  IN:    IV PiggyBack: 100 mL    Lactated Ringers: 2300 mL    PRBCs (Packed Red Blood Cells): 310 mL  Total IN: 2710 mL    OUT:    Voided (mL): 2050 mL  Total OUT: 2050 mL    Total NET: 660 mL    PHYSICAL EXAM:  GEN: NAD, awake and alert. No drooling or pooling of secretions. No stridor or stertor. Good vocal quality, slight hoarseness.   SKIN: Non diaphoretic.  HEENT: Oral mucosa pink and moist. No erythema or edema noted to buccal mucosa, tongue, FOM, uvula or posterior oropharynx. Uvula midline  NECK: Trachea midline. Neck supple, no TTP to B/L lateral neck.  RESP: Non-labored breathing. No use of accessory muscles.  CARDIO: +S1/S2  ABDO: Soft, NT.  EXT: GONZALEZ x 4    LABS:  CBC-             8.3    4.65  )-----------( 120      ( 28 May 2024 06:04 )             23.8     BMP/CMP-28 May 2024 06:04  138    |  100    |  18     ----------------------------<  97     3.9     |  30     |  1.4      Ca    8.9        28 May 2024 06:04  Mg     3.0       28 May 2024 06:04    TPro  4.9    /  Alb  3.4    /  TBili  0.4    /  DBili  x      /  AST  109    /  ALT  84     /  AlkPhos  74     28 May 2024 06:04

## 2024-05-28 NOTE — PROGRESS NOTE ADULT - SUBJECTIVE AND OBJECTIVE BOX
KRAIG DENNIS  64y Male    CHIEF COMPLAINT:    Patient is a 64y old  Male who presents with a chief complaint of Alcohol intoxication (27 May 2024 14:52)      INTERVAL HPI/OVERNIGHT EVENTS:    Patient seen and examined.    ROS: All other systems are negative.    Vital Signs:    T(F): 97.6 (05-28-24 @ 04:45), Max: 99 (05-27-24 @ 12:37)  HR: 80 (05-28-24 @ 04:45) (69 - 85)  BP: 136/72 (05-28-24 @ 04:45) (136/72 - 166/72)  RR: 18 (05-28-24 @ 04:45) (17 - 18)  SpO2: 99% (05-28-24 @ 04:45) (99% - 99%)  I&O's Summary    27 May 2024 07:01  -  28 May 2024 07:00  --------------------------------------------------------  IN: 2710 mL / OUT: 2050 mL / NET: 660 mL      Daily     Daily   CAPILLARY BLOOD GLUCOSE          PHYSICAL EXAM:    GENERAL:  NAD  SKIN: No rashes or lesions  HENT: Atraumatic. Normocephalic. PERRL. Moist membranes.  NECK: Supple, No JVD. No lymphadenopathy.  PULMONARY: CTA B/L. No wheezing. No rales  CVS: Normal S1, S2. Rate and Rhythm are regular. No murmurs.  ABDOMEN/GI: Soft, Nontender, Nondistended; BS present  EXTREMITIES: Peripheral pulses intact. No edema B/L LE.  NEUROLOGIC:  No motor or sensory deficit.  PSYCH: Alert & oriented x 3    Consultant(s) Notes Reviewed:  [x ] YES  [ ] NO  Care Discussed with Consultants/Other Providers [ x] YES  [ ] NO    EKG reviewed  Telemetry reviewed    LABS:                        8.3    4.65  )-----------( 120      ( 28 May 2024 06:04 )             23.8     05-28    138  |  100  |  18  ----------------------------<  97  3.9   |  30  |  1.4    Ca    8.9      28 May 2024 06:04  Mg     3.0     05-28    TPro  4.9<L>  /  Alb  3.4<L>  /  TBili  0.4  /  DBili  x   /  AST  109<H>  /  ALT  84<H>  /  AlkPhos  74  05-28              RADIOLOGY & ADDITIONAL TESTS:      Imaging or report Personally Reviewed:  [ ] YES  [ ] NO    Medications:  Standing  enoxaparin Injectable 40 milliGRAM(s) SubCutaneous every 24 hours  folic acid 1 milliGRAM(s) Oral daily  lactated ringers. 1000 milliLiter(s) IV Continuous <Continuous>  multivitamin 1 Tablet(s) Oral daily  nicotine - 21 mG/24Hr(s) Patch 1 Patch Transdermal daily  pantoprazole  Injectable 40 milliGRAM(s) IV Push two times a day  thiamine 100 milliGRAM(s) Oral daily    PRN Meds  acetaminophen     Tablet .. 650 milliGRAM(s) Oral every 6 hours PRN  aluminum hydroxide/magnesium hydroxide/simethicone Suspension 30 milliLiter(s) Oral every 4 hours PRN  benzocaine 20% Spray 1 Spray(s) Topical three times a day PRN  LORazepam   Injectable 2 milliGRAM(s) IV Push every 2 hours PRN  melatonin 3 milliGRAM(s) Oral at bedtime PRN  morphine  IR 15 milliGRAM(s) Oral two times a day PRN  ondansetron Injectable 4 milliGRAM(s) IV Push every 8 hours PRN      Case discussed with resident    Care discussed with pt/family           KRAIG DENNIS  64y Male    CHIEF COMPLAINT:    Patient is a 64y old  Male who presents with a chief complaint of Alcohol intoxication (27 May 2024 14:52)      INTERVAL HPI/OVERNIGHT EVENTS:    Patient seen and examined. No withdrawal symptoms today. No melena today. Having nausea and vomited once.     ROS: All other systems are negative.    Vital Signs:    T(F): 97.6 (05-28-24 @ 04:45), Max: 99 (05-27-24 @ 12:37)  HR: 80 (05-28-24 @ 04:45) (69 - 85)  BP: 136/72 (05-28-24 @ 04:45) (136/72 - 166/72)  RR: 18 (05-28-24 @ 04:45) (17 - 18)  SpO2: 99% (05-28-24 @ 04:45) (99% - 99%)  I&O's Summary    27 May 2024 07:01  -  28 May 2024 07:00  --------------------------------------------------------  IN: 2710 mL / OUT: 2050 mL / NET: 660 mL      Daily     Daily   CAPILLARY BLOOD GLUCOSE          PHYSICAL EXAM:    GENERAL:  NAD  SKIN: No rashes or lesions  HENT: Atraumatic. Normocephalic. PERRL. Moist membranes.  NECK: Supple, No JVD. No lymphadenopathy.  PULMONARY: CTA B/L. No wheezing. No rales  CVS: Normal S1, S2. Rate and Rhythm are regular. No murmurs.  ABDOMEN/GI: Soft, Nontender, Nondistended; BS present  EXTREMITIES: Peripheral pulses intact. No edema B/L LE.  NEUROLOGIC:  No motor or sensory deficit.  PSYCH: Alert & oriented x 3    Consultant(s) Notes Reviewed:  [x ] YES  [ ] NO  Care Discussed with Consultants/Other Providers [ x] YES  [ ] NO    EKG reviewed  Telemetry reviewed    LABS:                        8.3    4.65  )-----------( 120      ( 28 May 2024 06:04 )             23.8   Hemoglobin: 8.3 g/dL (05-28 @ 06:04)  Hemoglobin: 8.0 g/dL (05-27 @ 21:58)  Hemoglobin: 6.4 g/dL (05-27 @ 06:40)  Hemoglobin: 7.6 g/dL (05-26 @ 06:55)  Hemoglobin: 7.8 g/dL (05-25 @ 07:04)  Hemoglobin: 10.3 g/dL (05-24 @ 18:00)    05-28    138  |  100  |  18  ----------------------------<  97   Creatinine Trend: 1.4<--, 1.3<--, 1.4<--, 1.5<--, 1.4<--, 1.5<--  3.9   |  30  |  1.4    Ca    8.9      28 May 2024 06:04  Mg     3.0     05-28    TPro  4.9<L>  /  Alb  3.4<L>  /  TBili  0.4  /  DBili  x   /  AST  109<H>  /  ALT  84<H>  /  AlkPhos  74  05-28              RADIOLOGY & ADDITIONAL TESTS:      Imaging or report Personally Reviewed:  [ ] YES  [ ] NO    Medications:  Standing  enoxaparin Injectable 40 milliGRAM(s) SubCutaneous every 24 hours  folic acid 1 milliGRAM(s) Oral daily  lactated ringers. 1000 milliLiter(s) IV Continuous <Continuous>  multivitamin 1 Tablet(s) Oral daily  nicotine - 21 mG/24Hr(s) Patch 1 Patch Transdermal daily  pantoprazole  Injectable 40 milliGRAM(s) IV Push two times a day  thiamine 100 milliGRAM(s) Oral daily    PRN Meds  acetaminophen     Tablet .. 650 milliGRAM(s) Oral every 6 hours PRN  aluminum hydroxide/magnesium hydroxide/simethicone Suspension 30 milliLiter(s) Oral every 4 hours PRN  benzocaine 20% Spray 1 Spray(s) Topical three times a day PRN  LORazepam   Injectable 2 milliGRAM(s) IV Push every 2 hours PRN  melatonin 3 milliGRAM(s) Oral at bedtime PRN  morphine  IR 15 milliGRAM(s) Oral two times a day PRN  ondansetron Injectable 4 milliGRAM(s) IV Push every 8 hours PRN      Case discussed with resident    Care discussed with pt/family

## 2024-05-28 NOTE — CONSULT NOTE ADULT - SUBJECTIVE AND OBJECTIVE BOX
HPI:  64-year-old male with history of alcohol use disorder, throat cancer in remission, COPD not on home O2, active smoker ( 80 pack years) presents with acute alcohol intoxication. Pt states he has been drinking whiskey around the clock ( 4-6 glasses/ day) for the past 2-3 weeks without drinking any water or having any food. He states he was weaned off the morphine ( which was prescibed to him for throat pain) 2-3 weeks ago and since then he has been drinking alcohol to relieve pain. He today realized he is reaching to the point of no return and  wanted to go to rehab but was told to come to ED instead. He admits his last drink was 1 PM on 5/24/24. He endorses feeling nauseous.  Patient denies any trauma falls numbness tingling weakness vomiting. Denies history of alcohol withdrawal seizure. Denies any other drug use.     In ED vitals were  T(F): 97.8  HR: 76  BP: 136/86  RR: 18  SpO2: 97%    Labs were significant for Hgb 10.3, .8, Na+ 132, FS 59, Cl 88, Bicab 16, AG 28, BUN 49, Cr 1.5 ( baseline 1.0), lipase 109, serum osm 327,     VBG: pH 7.33, lactate 3.1, pCO2 34,     CTAP: Hepatic steatosis     EKG: NSR     Pt received 2 L IVF, IV thiamine, zofran, famotidine in ED. Pt is being admitted to medicine for further management.  (24 May 2024 23:56)    Patient reports that he was seeing palliative care via telehealth prior to admission and used to be on fentanyl patch and morphine. After shared decision-making, patient and doctor decided to wean off opioids. Patient states that throat pain resumed a few days after his opioids were weaned off.     PERTINENT PM/SXH:   Throat cancer    HTN (hypertension)        FAMILY HISTORY:    ITEMS NOT CHECKED ARE NOT PRESENT    SOCIAL HISTORY:   Significant other/partner[x ]  Children[ ]  Jain/Spirituality:  Substance hx:  [ ]   Tobacco hx:  [ ]   Alcohol hx: [ ]   Living Situation: [ ]Home  [ ]Long term care  [ ]Rehab [ ]Other  Home Services: [ ] HHA [ ] Visting RN [ ] Hospice  Occupation:  Home Opioid hx:  [ ] Y [ ] N [ ] I-Stop Reference No: [id number]    A	N		05/09/2024	05/09/2024	curaleaf (1:1) 2.5mgthc and 2.5 mgcbd/0.5 ml tct unflavored	1	5	Hardoon, Humberto	[id number]	Cash	[employer] [facility]  A	N	O	04/08/2024	04/09/2024	morphine sulf 10 mg/5 ml soln	105ml	21	Hardoon, Humberto	[id number]	Insurance	[facility]  A	N	O	03/13/2024	03/14/2024	morphine sulf 10 mg/5 ml soln	200ml	20	Hardoon, Humberto	[id number]	Insurance	[facility]  A	N	O	03/13/2024	03/14/2024	fentanyl 25 mcg/hr patch	10	30	Hardoon, Humberto	[id number]	[facility]  A	N	O	02/13/2024	02/16/2024	fentanyl 25 mcg/hr patch	10	30	Hardoon, Humberto	[id number]	[facility]  A	N	O	02/13/2024	02/14/2024	morphine sulf 10 mg/5 ml soln	200ml	20	Hardoon, Humberto	[id number]	[facility]    ADVANCE DIRECTIVES:    [ ] Full Code [ ] DNR  MOLST  [ ]  Living Will  [ ]   DECISION MAKER(s):  [ ] Health Care Proxy(s)  [ ] Surrogate(s)  [ ] Guardian           Name(s): Phone Number(s):    BASELINE (I)ADL(s) (prior to admission):  [place]: [ ]Total  [ ] Moderate [ ]Dependent  Palliative Performance Status Version 2:         %    http://[facility].org/files/news/palliative_performance_scale_[email or web address].pdf    Allergies    No Known Allergies    Intolerances    MEDICATIONS  (STANDING):  enoxaparin Injectable 40 milliGRAM(s) SubCutaneous every 24 hours  folic acid 1 milliGRAM(s) Oral daily  multivitamin 1 Tablet(s) Oral daily  nicotine - 21 mG/24Hr(s) Patch 1 Patch Transdermal daily  pantoprazole  Injectable 40 milliGRAM(s) IV Push two times a day  polyethylene glycol 3350 17 Gram(s) Oral daily  senna 2 Tablet(s) Oral at bedtime  thiamine 100 milliGRAM(s) Oral daily    MEDICATIONS  (PRN):  acetaminophen     Tablet .. 650 milliGRAM(s) Oral every 6 hours PRN Temp greater or equal to 38C (100.4F), Mild Pain (1 - 3)  aluminum hydroxide/magnesium hydroxide/simethicone Suspension 30 milliLiter(s) Oral every 4 hours PRN Dyspepsia  benzocaine 20% Spray 1 Spray(s) Topical three times a day PRN per patient request  LORazepam   Injectable 2 milliGRAM(s) IV Push every 2 hours PRN CIWA-Ar score increase by 2 points and a total score of 7 or less  melatonin 3 milliGRAM(s) Oral at bedtime PRN Insomnia  ondansetron Injectable 4 milliGRAM(s) IV Push every 8 hours PRN Nausea and/or Vomiting      PRESENT SYMPTOMS: [ ]Unable to obtain due to poor mentation   Source if other than patient:  [ ]Family   [ ]Team     Pain: [x ]yes [ ]no  QOL impact -   Location - throat                   Aggravating factors - swallowing  Alleviating factors - pain meds  Quality -  Radiation - none  Timing - months  Severity (0-10 scale): 7  Minimal acceptable level (0-10 scale):     CPOT:    https://www.[facility].org/getattachment/[email or web address]/Critical-Care-Pain-Observation-Tool-(CPOT)    PAIN AD Score:   http://geriatrictoolkit.[place].[facility]/cog/painad.pdf     Dyspnea:                           [x ]None[ ]Mild [ ]Moderate [ ]Severe     Respiratory Distress Observation Scale (RDOS):   A score of 0 to 2 signifies little or no respiratory distress, 3 signifies mild distress, scores 4 to 6 indicate moderate distress, and scores greater than 7 signify severe distress  [email or web address]://[email or web address].ca/sites/default/files/[email or web address]-wcsiujlctab-ajpsidsw-bzbvxwtamsp-uivcc.pdf    Anxiety:                             [ ]None[x ]Mild [ ]Moderate [ ]Severe   Fatigue:                             [ ]None[ ]Mild [ ]Moderate [ ]Severe   Nausea:                             [ ]None[ ]Mild [ ]Moderate [ ]Severe   Loss of appetite:              [ ]None[ ]Mild [ ]Moderate [ ]Severe   Constipation:                    [ ]None[ ]Mild [ ]Moderate [ ]Severe    Other Symptoms:  [x ]All other review of systems negative     Palliative Performance Status Version 2:         %    http://[facility]pcrc.org/files/news/palliative_performance_scale_ppsv2.pdf  PHYSICAL EXAM:  Vital Signs Last 24 Hrs  T(C): 36.5 (28 May 2024 12:34), Max: 37.1 (27 May 2024 16:22)  T(F): 97.7 (28 May 2024 12:34), Max: 98.7 (27 May 2024 16:22)  HR: 75 (28 May 2024 12:34) (69 - 80)  BP: 134/76 (28 May 2024 12:34) (134/76 - 166/72)  BP(mean): 103 (27 May 2024 18:32) (103 - 103)  RR: 18 (28 May 2024 12:34) (17 - 18)  SpO2: 99% (28 May 2024 04:45) (99% - 99%)     I&O's Summary    27 May 2024 07:01  -  28 May 2024 07:00  --------------------------------------------------------  IN: 2710 mL / OUT: 2050 mL / NET: 660 mL    28 May 2024 07:01  -  28 May 2024 16:10  --------------------------------------------------------  IN: 0 mL / OUT: 400 mL / NET: -400 mL        GENERAL:  NAD   PULMONARY:  Non labored breathing  NEUROLOGIC: Grossly intact  BEHAVIORAL/PSYCH:  Calm.     CRITICAL CARE:  [ ] Shock Present  [ ]Septic [ ]Cardiogenic [ ]Neurologic [ ]Hypovolemic  [ ]  Vasopressors [ ]  Inotropes   [ ]Respiratory failure present [ ]Mechanical ventilation [ ]Non-invasive ventilatory support [ ]High flow  [ ]Acute  [ ]Chronic [ ]Hypoxic  [ ]Hypercarbic [ ]Other  [ ]Other organ failure     LABS: I have reviewed daily labs                        8.3    4.65  )-----------( 120      ( 28 May 2024 06:04 )             23.8   05-28    138  |  100  |  18  ----------------------------<  97  3.9   |  30  |  1.4    Ca    8.9      28 May 2024 06:04  Mg     3.0     05-28    TPro  4.9<L>  /  Alb  3.4<L>  /  TBili  0.4  /  DBili  x   /  AST  109<H>  /  ALT  84<H>  /  AlkPhos  74  05-28      RADIOLOGY & ADDITIONAL STUDIES: I have reviewed new imaging    PROTEIN CALORIE MALNUTRITION PRESENT: [ ]mild [ ]moderate [ ]severe [ ]underweight [ ]morbid obesity  https://www.andeal.org/vault/2440/web/files/ONC/Table_Clinical%20Characteristics%20to%20Document%20Malnutrition-White%20JV%20et%20al%202012.pdf    Height (cm): 172.7 (05-24-24 @ 16:20)  Weight (kg): 61.2 (05-24-24 @ 16:20), 68.5 (08-31-23 @ 14:37)  BMI (kg/m2): 20.5 (05-24-24 @ 16:20)    [ ]PPSV2 < or = to 30% [ ]significant weight loss  [ ]poor nutritional intake  [ ]anasarca      [ ]Artificial Nutrition      Palliative Care Spiritual/Emotional Screening Tool Question  Severity (0-4):                    OR                    [ x] Unable to determine. Will assess at later time if appropriate.  Score of 2 or greater indicates recommendation of Chaplaincy and/or SW referral  Chaplaincy Referral: [ ] Yes [ ] Refused [ ] Following     Caregiver [place]:  [ ] Yes [ ] No    OR    [x ] Unable to determine. Will assess at later time if appropriate.  Social Work Referral [ ]  Patient and Family Centered Care Referral [ ]    Anticipatory Grief Present: [ ] Yes [ ] No    OR     [ x] Unable to determine. Will assess at later time if appropriate.  Social Work Referral [ ]  Patient and Family Centered Care Referral [ ]    REFERRALS:   [ ]Chaplaincy  [ ]Hospice  [ ]Child Life  [ ]Social Work  [ ]Case management [ ]Holistic Therapy     Palliative care education provided to patient and/or family

## 2024-05-28 NOTE — PROGRESS NOTE ADULT - ASSESSMENT
64-year-old male with history of heavy alcohol use disorder, throat cancer s/p chemo/RT (follows ), chronic PATSY on IV iron/blood transfusions COPD not on home O2, active smoker ( 80 pack years) presents with acute alcohol intoxication. Pt states he has been drinking whiskey around the clock ( 1bottle / day) for the past 2-3 weeks without drinking any water or having any food. He states he was weaned off the morphine/fentanyl for throat pain 2-3 weeks ago and since then he has been drinking alcohol to relieve pain. He admits his last drink was 1 PM on 5/24/24. He endorses feeling nauseous and has symptoms of withdrawal.  Patient denies any abdominal pain. Reports dark black stool this am but no blood.  He reports odynophagia since radiation but tolerating diet currently. Reports taking advil everyday. GI consulted for reported black stool, and chronic anemia.    #Acute on Chronic macrocytic anemia with PATSY  #Alcohol withdrawal  #Reported dark stool r/o upper GI source  #Chronic odynophagia with hx of throat cancer  - Hemodynamically stable & no active bleeding  - Baseline hemoglobin - 8  - Hemoglobin on admission - 10.3>7.8>7.6>6.9>s/p prbc  - reviewed Iron studies, receives iron transfusions per patient , follows   - No AC  - NSAID+, uses morphine/fentanyl  - s/p EGD with  in 2021    #Rec  - recalled GI today as patient is agreeable, ENT recommended CT neck, will follow ENT recs for clearance for EGD with anesthesia  -  c/w pantoprazole 40 BID, transfuse prbc, trend cbc  - Please give bowel regimen while on opioids: Miralax TID, senna 2 tabs, patient might need two day prep given opioid induced constipation  - will plan for EGD/Colonoscopy on thurs vs fri based on ENT clearance and prep; please keep on clears from tomorrow  - Please target Hb  >7, transfuse prn  - Please avoid any NSAIDs  - Recommend onc eval  - Maintain active Type and screen  - Trend H&H BID  - Please place x2 18G IVs  - CIWA protocol, Mg,thiamine,folate supplementation  - alcohol/smoking cessation advised

## 2024-05-28 NOTE — PROGRESS NOTE ADULT - SUBJECTIVE AND OBJECTIVE BOX
SUBJECTIVE:    Patient is a 64y old Male who presents with a chief complaint of Alcohol intoxication (28 May 2024 09:14)    Currently admitted to medicine with the primary diagnosis of:    Today is hospital day 4d.     Overnight Events:     no overnight events   transfused yesterday with improvement in hg     PAST MEDICAL & SURGICAL HISTORY  Throat cancer    HTN (hypertension)        ALLERGIES:  No Known Allergies    MEDICATIONS:  STANDING MEDICATIONS  enoxaparin Injectable 40 milliGRAM(s) SubCutaneous every 24 hours  folic acid 1 milliGRAM(s) Oral daily  lactated ringers. 1000 milliLiter(s) IV Continuous <Continuous>  multivitamin 1 Tablet(s) Oral daily  nicotine - 21 mG/24Hr(s) Patch 1 Patch Transdermal daily  pantoprazole  Injectable 40 milliGRAM(s) IV Push two times a day  thiamine 100 milliGRAM(s) Oral daily    PRN MEDICATIONS  acetaminophen     Tablet .. 650 milliGRAM(s) Oral every 6 hours PRN  aluminum hydroxide/magnesium hydroxide/simethicone Suspension 30 milliLiter(s) Oral every 4 hours PRN  benzocaine 20% Spray 1 Spray(s) Topical three times a day PRN  LORazepam   Injectable 2 milliGRAM(s) IV Push every 2 hours PRN  melatonin 3 milliGRAM(s) Oral at bedtime PRN  morphine  IR 15 milliGRAM(s) Oral two times a day PRN  ondansetron   Disintegrating Tablet 4 milliGRAM(s) Oral three times a day PRN  ondansetron Injectable 4 milliGRAM(s) IV Push every 8 hours PRN    VITALS:   ICU Vital Signs Last 24 Hrs  T(C): 36.4 (28 May 2024 04:45), Max: 37.2 (27 May 2024 12:37)  T(F): 97.6 (28 May 2024 04:45), Max: 99 (27 May 2024 12:37)  HR: 80 (28 May 2024 04:45) (69 - 85)  BP: 136/72 (28 May 2024 04:45) (136/72 - 166/72)  BP(mean): 103 (27 May 2024 18:32) (103 - 103)  ABP: --  ABP(mean): --  RR: 18 (28 May 2024 04:45) (17 - 18)  SpO2: 99% (28 May 2024 04:45) (99% - 99%)    O2 Parameters below as of 27 May 2024 12:37  Patient On (Oxygen Delivery Method): room air            LABS:                        8.3    4.65  )-----------( 120      ( 28 May 2024 06:04 )             23.8     05-28    138  |  100  |  18  ----------------------------<  97  3.9   |  30  |  1.4    Ca    8.9      28 May 2024 06:04  Mg     3.0     05-28    TPro  4.9<L>  /  Alb  3.4<L>  /  TBili  0.4  /  DBili  x   /  AST  109<H>  /  ALT  84<H>  /  AlkPhos  74  05-28      Urinalysis Basic - ( 28 May 2024 06:04 )    Color: x / Appearance: x / SG: x / pH: x  Gluc: 97 mg/dL / Ketone: x  / Bili: x / Urobili: x   Blood: x / Protein: x / Nitrite: x   Leuk Esterase: x / RBC: x / WBC x   Sq Epi: x / Non Sq Epi: x / Bacteria: x                  RADIOLOGY:  no new rads rads   PHYSICAL EXAM:  GEN: no acute distress   LUNGS: satting well on ra  HEART: normal rate   ABD: nontender   EXT: no lower extremity edema   NEURO: ao 3

## 2024-05-29 LAB
ALBUMIN SERPL ELPH-MCNC: 3.2 G/DL — LOW (ref 3.5–5.2)
ALP SERPL-CCNC: 69 U/L — SIGNIFICANT CHANGE UP (ref 30–115)
ALT FLD-CCNC: 94 U/L — HIGH (ref 0–41)
ANION GAP SERPL CALC-SCNC: 10 MMOL/L — SIGNIFICANT CHANGE UP (ref 7–14)
AST SERPL-CCNC: 72 U/L — HIGH (ref 0–41)
BASOPHILS # BLD AUTO: 0.01 K/UL — SIGNIFICANT CHANGE UP (ref 0–0.2)
BASOPHILS NFR BLD AUTO: 0.2 % — SIGNIFICANT CHANGE UP (ref 0–1)
BILIRUB SERPL-MCNC: 0.3 MG/DL — SIGNIFICANT CHANGE UP (ref 0.2–1.2)
BUN SERPL-MCNC: 22 MG/DL — HIGH (ref 10–20)
CALCIUM SERPL-MCNC: 9 MG/DL — SIGNIFICANT CHANGE UP (ref 8.4–10.4)
CHLORIDE SERPL-SCNC: 99 MMOL/L — SIGNIFICANT CHANGE UP (ref 98–110)
CO2 SERPL-SCNC: 29 MMOL/L — SIGNIFICANT CHANGE UP (ref 17–32)
CREAT SERPL-MCNC: 1.7 MG/DL — HIGH (ref 0.7–1.5)
EGFR: 44 ML/MIN/1.73M2 — LOW
EOSINOPHIL # BLD AUTO: 0.04 K/UL — SIGNIFICANT CHANGE UP (ref 0–0.7)
EOSINOPHIL NFR BLD AUTO: 0.9 % — SIGNIFICANT CHANGE UP (ref 0–8)
GLUCOSE SERPL-MCNC: 155 MG/DL — HIGH (ref 70–99)
HCT VFR BLD CALC: 22.5 % — LOW (ref 42–52)
HGB BLD-MCNC: 7.7 G/DL — LOW (ref 14–18)
IMM GRANULOCYTES NFR BLD AUTO: 2.8 % — HIGH (ref 0.1–0.3)
LYMPHOCYTES # BLD AUTO: 0.31 K/UL — LOW (ref 1.2–3.4)
LYMPHOCYTES # BLD AUTO: 6.6 % — LOW (ref 20.5–51.1)
MAGNESIUM SERPL-MCNC: 1.7 MG/DL — LOW (ref 1.8–2.4)
MCHC RBC-ENTMCNC: 34.2 G/DL — SIGNIFICANT CHANGE UP (ref 32–37)
MCHC RBC-ENTMCNC: 35.5 PG — HIGH (ref 27–31)
MCV RBC AUTO: 103.7 FL — HIGH (ref 80–94)
MONOCYTES # BLD AUTO: 0.7 K/UL — HIGH (ref 0.1–0.6)
MONOCYTES NFR BLD AUTO: 15 % — HIGH (ref 1.7–9.3)
NEUTROPHILS # BLD AUTO: 3.48 K/UL — SIGNIFICANT CHANGE UP (ref 1.4–6.5)
NEUTROPHILS NFR BLD AUTO: 74.5 % — SIGNIFICANT CHANGE UP (ref 42.2–75.2)
NRBC # BLD: 0 /100 WBCS — SIGNIFICANT CHANGE UP (ref 0–0)
PLATELET # BLD AUTO: 136 K/UL — SIGNIFICANT CHANGE UP (ref 130–400)
PMV BLD: 9.5 FL — SIGNIFICANT CHANGE UP (ref 7.4–10.4)
POTASSIUM SERPL-MCNC: 4.2 MMOL/L — SIGNIFICANT CHANGE UP (ref 3.5–5)
POTASSIUM SERPL-SCNC: 4.2 MMOL/L — SIGNIFICANT CHANGE UP (ref 3.5–5)
PROT SERPL-MCNC: 5 G/DL — LOW (ref 6–8)
RBC # BLD: 2.17 M/UL — LOW (ref 4.7–6.1)
RBC # FLD: 17.1 % — HIGH (ref 11.5–14.5)
SODIUM SERPL-SCNC: 138 MMOL/L — SIGNIFICANT CHANGE UP (ref 135–146)
UUN UR-MCNC: 253 MG/DL — SIGNIFICANT CHANGE UP
WBC # BLD: 4.67 K/UL — LOW (ref 4.8–10.8)
WBC # FLD AUTO: 4.67 K/UL — LOW (ref 4.8–10.8)

## 2024-05-29 PROCEDURE — 99232 SBSQ HOSP IP/OBS MODERATE 35: CPT

## 2024-05-29 RX ORDER — MAGNESIUM SULFATE 500 MG/ML
2 VIAL (ML) INJECTION ONCE
Refills: 0 | Status: COMPLETED | OUTPATIENT
Start: 2024-05-29 | End: 2024-05-29

## 2024-05-29 RX ORDER — SOD SULF/SODIUM/NAHCO3/KCL/PEG
4000 SOLUTION, RECONSTITUTED, ORAL ORAL ONCE
Refills: 0 | Status: COMPLETED | OUTPATIENT
Start: 2024-05-29 | End: 2024-05-29

## 2024-05-29 RX ORDER — POLYETHYLENE GLYCOL 3350 17 G/17G
17 POWDER, FOR SOLUTION ORAL
Refills: 0 | Status: DISCONTINUED | OUTPATIENT
Start: 2024-05-29 | End: 2024-05-31

## 2024-05-29 RX ORDER — SODIUM CHLORIDE 9 MG/ML
500 INJECTION, SOLUTION INTRAVENOUS ONCE
Refills: 0 | Status: COMPLETED | OUTPATIENT
Start: 2024-05-29 | End: 2024-05-29

## 2024-05-29 RX ADMIN — Medication 2 MILLIGRAM(S): at 10:11

## 2024-05-29 RX ADMIN — Medication 5 MILLIGRAM(S): at 21:32

## 2024-05-29 RX ADMIN — Medication 1 MILLIGRAM(S): at 11:39

## 2024-05-29 RX ADMIN — PANTOPRAZOLE SODIUM 40 MILLIGRAM(S): 20 TABLET, DELAYED RELEASE ORAL at 05:01

## 2024-05-29 RX ADMIN — PANTOPRAZOLE SODIUM 40 MILLIGRAM(S): 20 TABLET, DELAYED RELEASE ORAL at 17:30

## 2024-05-29 RX ADMIN — Medication 2 MILLIGRAM(S): at 05:01

## 2024-05-29 RX ADMIN — MORPHINE SULFATE 10 MILLIGRAM(S): 50 CAPSULE, EXTENDED RELEASE ORAL at 11:38

## 2024-05-29 RX ADMIN — Medication 4000 MILLILITER(S): at 10:09

## 2024-05-29 RX ADMIN — Medication 2 MILLIGRAM(S): at 20:05

## 2024-05-29 RX ADMIN — Medication 25 GRAM(S): at 10:31

## 2024-05-29 RX ADMIN — SENNA PLUS 2 TABLET(S): 8.6 TABLET ORAL at 21:32

## 2024-05-29 RX ADMIN — MORPHINE SULFATE 10 MILLIGRAM(S): 50 CAPSULE, EXTENDED RELEASE ORAL at 22:02

## 2024-05-29 RX ADMIN — SODIUM CHLORIDE 666.67 MILLILITER(S): 9 INJECTION, SOLUTION INTRAVENOUS at 10:31

## 2024-05-29 RX ADMIN — Medication 1 PATCH: at 11:39

## 2024-05-29 RX ADMIN — ENOXAPARIN SODIUM 40 MILLIGRAM(S): 100 INJECTION SUBCUTANEOUS at 05:01

## 2024-05-29 RX ADMIN — Medication 1 PATCH: at 19:00

## 2024-05-29 RX ADMIN — POLYETHYLENE GLYCOL 3350 17 GRAM(S): 17 POWDER, FOR SOLUTION ORAL at 11:39

## 2024-05-29 RX ADMIN — Medication 1 TABLET(S): at 11:39

## 2024-05-29 RX ADMIN — MORPHINE SULFATE 10 MILLIGRAM(S): 50 CAPSULE, EXTENDED RELEASE ORAL at 21:32

## 2024-05-29 RX ADMIN — Medication 100 MILLIGRAM(S): at 11:39

## 2024-05-29 NOTE — PROGRESS NOTE ADULT - SUBJECTIVE AND OBJECTIVE BOX
KRAIG DENNIS  64y Male    CHIEF COMPLAINT:    Patient is a 64y old  Male who presents with a chief complaint of Alcohol intoxication (28 May 2024 17:41)      INTERVAL HPI/OVERNIGHT EVENTS:    Patient seen and examined.    ROS: All other systems are negative.    Vital Signs:    T(F): 98.3 (24 @ 04:20), Max: 98.3 (24 @ 04:20)  HR: 80 (24 @ 04:20) (73 - 80)  BP: 137/66 (24 @ 04:20) (132/70 - 137/66)  RR: 18 (24 @ 04:20) (18 - 18)  SpO2: --  I&O's Summary    28 May 2024 07:01  -  29 May 2024 07:00  --------------------------------------------------------  IN: 352 mL / OUT: 850 mL / NET: -498 mL      Daily     Daily Weight in k.7 (29 May 2024 04:20)  CAPILLARY BLOOD GLUCOSE          PHYSICAL EXAM:    GENERAL:  NAD  SKIN: No rashes or lesions  HENT: Atraumatic. Normocephalic. PERRL. Moist membranes.  NECK: Supple, No JVD. No lymphadenopathy.  PULMONARY: CTA B/L. No wheezing. No rales  CVS: Normal S1, S2. Rate and Rhythm are regular. No murmurs.  ABDOMEN/GI: Soft, Nontender, Nondistended; BS present  EXTREMITIES: Peripheral pulses intact. No edema B/L LE.  NEUROLOGIC:  No motor or sensory deficit.  PSYCH: Alert & oriented x 3    Consultant(s) Notes Reviewed:  [x ] YES  [ ] NO  Care Discussed with Consultants/Other Providers [ x] YES  [ ] NO    EKG reviewed  Telemetry reviewed    LABS:                        8.3    4.65  )-----------( 120      ( 28 May 2024 06:04 )             23.8         138  |  100  |  18  ----------------------------<  97  3.9   |  30  |  1.4    Ca    8.9      28 May 2024 06:04  Mg     3.0         TPro  4.9<L>  /  Alb  3.4<L>  /  TBili  0.4  /  DBili  x   /  AST  109<H>  /  ALT  84<H>  /  AlkPhos  74                RADIOLOGY & ADDITIONAL TESTS:    < from: CT Neck Soft Tissue w/ IV Cont (24 @ 16:29) >    IMPRESSION:  No acute pathology in the neck.    Posttreatment related changes noted along the upper aerodigestive tract   without evidence for nodular or masslike enhancement to suggest neoplasm.    < end of copied text >    Imaging or report Personally Reviewed:  [x ] YES  [ ] NO    Medications:  Standing  enoxaparin Injectable 40 milliGRAM(s) SubCutaneous every 24 hours  folic acid 1 milliGRAM(s) Oral daily  multivitamin 1 Tablet(s) Oral daily  nicotine - 21 mG/24Hr(s) Patch 1 Patch Transdermal daily  pantoprazole  Injectable 40 milliGRAM(s) IV Push two times a day  polyethylene glycol 3350 17 Gram(s) Oral daily  senna 2 Tablet(s) Oral at bedtime  thiamine 100 milliGRAM(s) Oral daily    PRN Meds  acetaminophen     Tablet .. 650 milliGRAM(s) Oral every 6 hours PRN  aluminum hydroxide/magnesium hydroxide/simethicone Suspension 30 milliLiter(s) Oral every 4 hours PRN  benzocaine 20% Spray 1 Spray(s) Topical three times a day PRN  LORazepam   Injectable 2 milliGRAM(s) IV Push every 2 hours PRN  melatonin 3 milliGRAM(s) Oral at bedtime PRN  morphine Concentrate 10 milliGRAM(s) Oral every 4 hours PRN  ondansetron Injectable 4 milliGRAM(s) IV Push every 8 hours PRN      Case discussed with resident    Care discussed with pt/family           KRAIG DENNIS  64y Male    CHIEF COMPLAINT:    Patient is a 64y old  Male who presents with a chief complaint of Alcohol intoxication (28 May 2024 17:41)      INTERVAL HPI/OVERNIGHT EVENTS:    Patient seen and examined. No more melena. No more withdrawal symptoms. Feels better.     ROS: All other systems are negative.    Vital Signs:    T(F): 98.3 (24 @ 04:20), Max: 98.3 (24 @ 04:20)  HR: 80 (24 @ 04:20) (73 - 80)  BP: 137/66 (24 @ 04:20) (132/70 - 137/66)  RR: 18 (24 @ 04:20) (18 - 18)  SpO2: --  I&O's Summary    28 May 2024 07:01  -  29 May 2024 07:00  --------------------------------------------------------  IN: 352 mL / OUT: 850 mL / NET: -498 mL      Daily     Daily Weight in k.7 (29 May 2024 04:20)  CAPILLARY BLOOD GLUCOSE          PHYSICAL EXAM:    GENERAL:  NAD  SKIN: No rashes or lesions  HENT: Atraumatic. Normocephalic. PERRL. Moist membranes.  NECK: Supple, No JVD. No lymphadenopathy.  PULMONARY: CTA B/L. No wheezing. No rales  CVS: Normal S1, S2. Rate and Rhythm are regular. No murmurs.  ABDOMEN/GI: Soft, Nontender, Nondistended; BS present  EXTREMITIES: Peripheral pulses intact. No edema B/L LE.  NEUROLOGIC:  No motor or sensory deficit.  PSYCH: Alert & oriented x 3    Consultant(s) Notes Reviewed:  [x ] YES  [ ] NO  Care Discussed with Consultants/Other Providers [ x] YES  [ ] NO    EKG reviewed  Telemetry reviewed    LABS:                        8.3    4.65  )-----------( 120      ( 28 May 2024 06:04 )             23.8         138  |  100  |  18  ----------------------------<  97    Creatinine Trend: 1.7<--, 1.4<--, 1.3<--, 1.4<--, 1.5<--, 1.4<--  3.9   |  30  |  1.4    Ca    8.9      28 May 2024 06:04  Mg     3.0         TPro  4.9<L>  /  Alb  3.4<L>  /  TBili  0.4  /  DBili  x   /  AST  109<H>  /  ALT  84<H>  /  AlkPhos  74                RADIOLOGY & ADDITIONAL TESTS:    < from: CT Neck Soft Tissue w/ IV Cont (24 @ 16:29) >    IMPRESSION:  No acute pathology in the neck.    Posttreatment related changes noted along the upper aerodigestive tract   without evidence for nodular or masslike enhancement to suggest neoplasm.    < end of copied text >    Imaging or report Personally Reviewed:  [x ] YES  [ ] NO    Medications:  Standing  enoxaparin Injectable 40 milliGRAM(s) SubCutaneous every 24 hours  folic acid 1 milliGRAM(s) Oral daily  multivitamin 1 Tablet(s) Oral daily  nicotine - 21 mG/24Hr(s) Patch 1 Patch Transdermal daily  pantoprazole  Injectable 40 milliGRAM(s) IV Push two times a day  polyethylene glycol 3350 17 Gram(s) Oral daily  senna 2 Tablet(s) Oral at bedtime  thiamine 100 milliGRAM(s) Oral daily    PRN Meds  acetaminophen     Tablet .. 650 milliGRAM(s) Oral every 6 hours PRN  aluminum hydroxide/magnesium hydroxide/simethicone Suspension 30 milliLiter(s) Oral every 4 hours PRN  benzocaine 20% Spray 1 Spray(s) Topical three times a day PRN  LORazepam   Injectable 2 milliGRAM(s) IV Push every 2 hours PRN  melatonin 3 milliGRAM(s) Oral at bedtime PRN  morphine Concentrate 10 milliGRAM(s) Oral every 4 hours PRN  ondansetron Injectable 4 milliGRAM(s) IV Push every 8 hours PRN      Case discussed with resident    Care discussed with pt/family

## 2024-05-29 NOTE — PROGRESS NOTE ADULT - ASSESSMENT
64-year-old male with history of alcohol use disorder, throat cancer in remission, COPD not on home O2, active smoker ( 80 pack years) presents with acute alcohol intoxication. Pt states he has been drinking whiskey around the clock ( 4-6 glasses/ day) for the past 2-3 weeks without drinking any water or having any food. He states he was weaned off the morphine ( which was prescibed to him for throat pain) 2-3 weeks ago and since then he has been drinking alcohol to relieve pain. He today realized he is reaching to the point of no return and  wanted to go to rehab but was told to come to ED instead. He admits his last drink was 1 PM on 5/24/24. He endorses feeling nauseous.  Patient denies any trauma falls numbness tingling weakness vomiting. Denies history of alcohol withdrawal seizure. Denies any other drug use.     Patient seen by ENT, FFL showing post-radiation changes and edema. Patient states that morphine is helpful and most recent prescription prior to admission was for PO morphine solution 10mg    5/29: Pain well controlled with Roxanol    Plan:  - continue roxanol solution 10mg q4h PRN for pain  - agree with senna and miralax  - will discuss with patient's outpatient palliative provider (Dr. Humberto Lu) that we are resuming opioids    Palliative care will continue to follow.   Please call [phone] with questions or concerns 24/7.   _____________  Richie Grove MD  Palliative Medicine  [facility]   of Geriatric and Palliative Medicine  [phone]

## 2024-05-29 NOTE — PROGRESS NOTE ADULT - SUBJECTIVE AND OBJECTIVE BOX
SUBJECTIVE:    Patient is a 64y old Male who presents with a chief complaint of Alcohol intoxication (29 May 2024 09:07)    Currently admitted to medicine with the primary diagnosis of:    Today is hospital day 5d.     Overnight Events:     ct neck performed overnight , neg for malignancy     PAST MEDICAL & SURGICAL HISTORY  Throat cancer    HTN (hypertension)        ALLERGIES:  No Known Allergies    MEDICATIONS:  STANDING MEDICATIONS  bisacodyl 5 milliGRAM(s) Oral at bedtime  enoxaparin Injectable 40 milliGRAM(s) SubCutaneous every 24 hours  folic acid 1 milliGRAM(s) Oral daily  multivitamin 1 Tablet(s) Oral daily  nicotine - 21 mG/24Hr(s) Patch 1 Patch Transdermal daily  pantoprazole  Injectable 40 milliGRAM(s) IV Push two times a day  polyethylene glycol 3350 17 Gram(s) Oral <User Schedule>  polyethylene glycol/electrolyte Solution. 4000 milliLiter(s) Oral once  senna 2 Tablet(s) Oral at bedtime  thiamine 100 milliGRAM(s) Oral daily    PRN MEDICATIONS  acetaminophen     Tablet .. 650 milliGRAM(s) Oral every 6 hours PRN  aluminum hydroxide/magnesium hydroxide/simethicone Suspension 30 milliLiter(s) Oral every 4 hours PRN  benzocaine 20% Spray 1 Spray(s) Topical three times a day PRN  LORazepam   Injectable 2 milliGRAM(s) IV Push every 2 hours PRN  melatonin 3 milliGRAM(s) Oral at bedtime PRN  morphine Concentrate 10 milliGRAM(s) Oral every 4 hours PRN  ondansetron Injectable 4 milliGRAM(s) IV Push every 8 hours PRN    VITALS:   ICU Vital Signs Last 24 Hrs  T(C): 36.8 (29 May 2024 04:20), Max: 36.8 (29 May 2024 04:20)  T(F): 98.3 (29 May 2024 04:20), Max: 98.3 (29 May 2024 04:20)  HR: 80 (29 May 2024 04:20) (73 - 80)  BP: 137/66 (29 May 2024 04:20) (132/70 - 137/66)  BP(mean): --  ABP: --  ABP(mean): --  RR: 18 (29 May 2024 04:20) (18 - 18)  SpO2: --        LABS:                        7.7    4.67  )-----------( 136      ( 29 May 2024 07:15 )             22.5     05-29    138  |  99  |  22<H>  ----------------------------<  155<H>  4.2   |  29  |  1.7<H>    Ca    9.0      29 May 2024 07:15  Mg     1.7     05-29    TPro  5.0<L>  /  Alb  3.2<L>  /  TBili  0.3  /  DBili  x   /  AST  72<H>  /  ALT  94<H>  /  AlkPhos  69  05-29      Urinalysis Basic - ( 29 May 2024 07:15 )    Color: x / Appearance: x / SG: x / pH: x  Gluc: 155 mg/dL / Ketone: x  / Bili: x / Urobili: x   Blood: x / Protein: x / Nitrite: x   Leuk Esterase: x / RBC: x / WBC x   Sq Epi: x / Non Sq Epi: x / Bacteria: x                  RADIOLOGY:  < from: CT Neck Soft Tissue w/ IV Cont (05.28.24 @ 16:29) >  No acute pathology in the neck.    Posttreatment related changes noted along the upper aerodigestive tract   without evidence for nodular or masslike enhancement to suggest neoplasm.    < end of copied text >    PHYSICAL EXAM:  GEN: laying in bed   LUNGS: clear   HEART: s1 s2 , normal rate   ABD: nontender   EXT: no edema   NEURO: ao 3

## 2024-05-29 NOTE — PROGRESS NOTE ADULT - ASSESSMENT
64-year-old male with history of heavy alcohol use disorder, throat cancer s/p chemo/RT (follows ), chronic PATSY on IV iron/blood transfusions COPD not on home O2, active smoker ( 80 pack years) presents with acute alcohol intoxication. Pt states he has been drinking whiskey around the clock ( 1bottle / day) for the past 2-3 weeks without drinking any water or having any food. He states he was weaned off the morphine/fentanyl for throat pain 2-3 weeks ago and since then he has been drinking alcohol to relieve pain. He admits his last drink was 1 PM on 5/24/24. He endorses feeling nauseous and has symptoms of withdrawal.  Patient denies any abdominal pain. Reports dark black stool this am but no blood.  He reports odynophagia since radiation but tolerating diet currently. Reports taking advil everyday. GI consulted for reported black stool, and chronic anemia.    #Acute on Chronic macrocytic anemia with PATSY  #Alcohol withdrawal  #Reported dark stool r/o upper GI source  #Chronic odynophagia with hx of throat cancer  - Hemodynamically stable & no active bleeding  - Baseline hemoglobin - 8  - Hemoglobin on admission - 10.3>7.8>7.6>6.9>s/p prbc  - reviewed Iron studies, receives iron transfusions per patient , follows   - No AC  - NSAID+, uses morphine/fentanyl  - s/p EGD with  in 2021  - - recalled GI today as patient is agreeable, ENT recommended CT neck, will follow ENT recs for clearance for EGD with anesthesia    #Rec  - Diet ch  -  c/w pantoprazole 40 PO BID (change from IV to PO ), transfuse prbc, trend cbc  - Please give bowel regimen while on opioids: Miralax TID, senna 2 tabs, patient might need two day prep given opioid induced constipation  - will plan for EGD/Colonoscopy on thurs vs fri based on ENT clearance and prep; please keep on clears from tomorrow  - Please target Hb  >7, transfuse prn  - Please avoid any NSAIDs  - Recommend onc eval  - Maintain active Type and screen  - Trend H&H BID  - Please place x2 18G IVs  - CIWA protocol, Mg,thiamine,folate supplementation  - alcohol/smoking cessation advised   64-year-old male with history of heavy alcohol use disorder, throat cancer s/p chemo/RT (follows ), chronic PATSY on IV iron/blood transfusions COPD not on home O2, active smoker ( 80 pack years) presents with acute alcohol intoxication. Pt states he has been drinking whiskey around the clock ( 1bottle / day) for the past 2-3 weeks without drinking any water or having any food. He states he was weaned off the morphine/fentanyl for throat pain 2-3 weeks ago and since then he has been drinking alcohol to relieve pain. He admits his last drink was 1 PM on 5/24/24. He endorses feeling nauseous and has symptoms of withdrawal.  Patient denies any abdominal pain. Reports dark black stool this am but no blood.  He reports odynophagia since radiation but tolerating diet currently. Reports taking advil everyday. GI consulted for reported black stool, and chronic anemia.    #Acute on Chronic macrocytic anemia with PATSY  #Alcohol withdrawal  #Reported dark stool r/o upper GI source  #Chronic odynophagia with hx of throat cancer  - Hemodynamically stable & no active bleeding  - Baseline hemoglobin - 8  - Hemoglobin on admission - 10.3>7.8>7.6>6.9>s/p prbc  - reviewed Iron studies, receives iron transfusions per patient , follows   - No AC  - NSAID+, uses morphine/fentanyl  - s/p EGD with  in 2021  - - recalled GI today as patient is agreeable  - CT neck: post radiation changes of upper aerodigestive tract    #Rec  - Diet changed to clears, bowel prep ordered, will ENT clearance for EGD ; will tentatively plan for EGD/Colonoscopy  -  c/w pantoprazole 40 PO BID (change from IV to PO ), transfuse prbc, trend cbc  - Please give bowel regimen while on opioids: Miralax TID, senna 2 tabs, patient might need two day prep given opioid induced constipation  - will plan for EGD/Colonoscopy on thurs vs fri based on ENT clearance and prep; please keep on clears from tomorrow  - Please target Hb  >7, transfuse prn  - Please avoid any NSAIDs  - Recommend onc eval  - Maintain active Type and screen  - Trend H&H BID  - Please place x2 18G IVs  - CIWA protocol, Mg,thiamine,folate supplementation  - alcohol/smoking cessation advised   64-year-old male with history of heavy alcohol use disorder, throat cancer s/p chemo/RT (follows ), chronic PATSY on IV iron/blood transfusions COPD not on home O2, active smoker ( 80 pack years) presents with acute alcohol intoxication. Pt states he has been drinking whiskey around the clock ( 1bottle / day) for the past 2-3 weeks without drinking any water or having any food. He states he was weaned off the morphine/fentanyl for throat pain 2-3 weeks ago and since then he has been drinking alcohol to relieve pain. He admits his last drink was 1 PM on 5/24/24. He endorses feeling nauseous and has symptoms of withdrawal.  Patient denies any abdominal pain. Reports dark black stool this am but no blood.  He reports odynophagia since radiation but tolerating diet currently. Reports taking advil everyday. GI consulted for reported black stool, and chronic anemia.    #Acute on Chronic macrocytic anemia with PATSY  #Alcohol withdrawal  #Reported dark stool r/o upper GI source  #Chronic odynophagia with hx of throat cancer  - Hemodynamically stable & no active bleeding  - Baseline hemoglobin - 8  - Hemoglobin on admission - 10.3>7.8>7.6>6.9>s/p prbc  - reviewed Iron studies, receives iron transfusions per patient , follows   - No AC  - NSAID+, uses morphine/fentanyl  - s/p EGD with  in 2021  - - recalled GI today as patient is agreeable  - CT neck: post radiation changes of upper aerodigestive tract    #Rec  - Diet changed to clears, bowel prep ordered, will need ENT clearance for EGD ; will tentatively plan for EGD/Colonoscopy on 5/30  -  c/w pantoprazole 40 PO BID (change from IV to PO ), transfuse prbc, trend cbc  - Please give bowel regimen while on opioids: Miralax TID, senna 2 tabs, patient might need two day prep given opioid induced constipation  - will plan for EGD/Colonoscopy on thurs vs fri based on ENT clearance and prep; please keep on clears from tomorrow  - Please target Hb  >7, transfuse prn  - Please avoid any NSAIDs  - Recommend onc eval  - Maintain active Type and screen  - Trend H&H BID  - Please place x2 18G IVs  - CIWA protocol, Mg,thiamine,folate supplementation  - alcohol/smoking cessation advised   64-year-old male with history of heavy alcohol use disorder, throat cancer s/p chemo/RT (follows ), chronic PATSY on IV iron/blood transfusions COPD not on home O2, active smoker ( 80 pack years) presents with acute alcohol intoxication. Pt states he has been drinking whiskey around the clock ( 1bottle / day) for the past 2-3 weeks without drinking any water or having any food. He states he was weaned off the morphine/fentanyl for throat pain 2-3 weeks ago and since then he has been drinking alcohol to relieve pain. He admits his last drink was 1 PM on 5/24/24. He endorses feeling nauseous and has symptoms of withdrawal.  Patient denies any abdominal pain. Reports dark black stool this am but no blood.  He reports odynophagia since radiation but tolerating diet currently. Reports taking advil everyday. GI consulted for reported black stool, and chronic anemia.    #Acute on Chronic macrocytic anemia with PATSY  #Alcohol withdrawal  #Reported dark stool r/o upper GI source  #Chronic odynophagia with hx of throat cancer  - Hemodynamically stable & no active bleeding  - Baseline hemoglobin - 8  - Hemoglobin on admission - 10.3>7.8>7.6>6.9>s/p prbc  - reviewed Iron studies, receives iron transfusions per patient , follows   - No AC  - NSAID+, uses morphine/fentanyl  - s/p EGD with  in 2021  - - recalled GI today as patient is agreeable  - CT neck: post radiation changes of upper aerodigestive tract    #Rec  - Diet changed to clears, bowel prep ordered, will need ENT clearance for EGD ; will tentatively plan for EGD/Colonoscopy on 5/30  -  c/w pantoprazole 40 PO BID (change from IV to PO ), transfuse prbc, trend cbc  - Please give bowel regimen while on opioids: Miralax TID, senna 2 tabs, patient might need two day prep given opioid induced constipation  - will plan for EGD/Colonoscopy on thurs vs fri based on ENT clearance and prep; please keep on clears from tomorrow  - Please target Hb  >7, transfuse prn  - Please avoid any NSAIDs  - Recommend onc eval  - Maintain active Type and screen  - Trend H&H BID  - Please place x2 18G IVs  - CIWA protocol, Mg,thiamine,folate supplementation  - alcohol/smoking cessation advised    #Elevated liver enzymes likely sec AUD   T nasim 0.3   AP 69  AST 72  ALT 94  05-29-24 @ 07:15  T nasim 0.4   AP 74    ALT 84  05-28-24 @ 06:04  T nasim 0.3   AP 70  AST 60  ALT 38  05-27-24 @ 06:40  T nasim 0.5   AP 82  AST 30  ALT 18  05-26-24 @ 06:55  T nasim 0.8   AP 88  AST 24  ALT 16  05-25-24 @ 07:04    RECS:  - please obtain RUQ US  - please check hepatitis panel  - avoid hepato toxic agents/hypotension

## 2024-05-29 NOTE — PROGRESS NOTE ADULT - ASSESSMENT
64-year-old male with history of alcohol use disorder, throat cancer in remission, COPD not on home O2, active smoker ( 80 pack years) presents with acute alcohol intoxication. Pt states he has been drinking whiskey around the clock ( 4-6 glasses/ day) for the past 2-3 weeks without drinking any water or having any food. He states he was weaned off the morphine ( which was prescibed to him for throat pain) 2-3 weeks ago and since then he has been drinking alcohol to relieve pain.    Alcohol withdrawal   Alcohol use disorder  Starvation ketosis  SERVANDO  Hyponatremia, resolved  COPD  Tobacco use  H/O throat Ca   Anemia              PLAN:    ·	Tele reviewed. No events  ·	EKG on admission: NSR 90/min (Interpreted by me)  ·	Alcohol level is <10  ·	CIWA score zero today  ·	Ativan 2 mg ivp q 2h prn for withdrawal.   ·	S/P one unit of PRBC's. H/H is stable  ·	Monitor H/H and keep Hb >7  ·	Cont Protonix IV 40 mg q 12h  ·	GI f/u noted. Will schedule him for EGD/Colonoscopy on Thursday or Friday  ·	ENT eval noted. FFL at bedside shows + post radiation edema noted to the epiglottis and arytenoids b/l. + edema/fullness noted to the posterior cricoid space, no overt masses noted. No bleeding or old blood noted in larynx  ·	CT neck soft tissue noted. No acute pathology.   ·	Pain management eval noted. Recommended Roxanol solution 10mg q4h PRN for pain  ·	Stool guaiac. Serum iron is 63 and percent sat is 34  ·	Cont MVI, Thiamine and Folic acid.   ·	Monitor electrolytes.   ·	CATCH team consult  ·	Labs noted. Cr is stable  ·	Tobacco use. Started him on Nicotine patch  ·	PT eval    Progress Note Handoff    Pending (specify):  Consults__CATCH team, GI f/u______, Tests________, Test Results_______, Other GI bleed ________  Family discussion:  Disposition: Home___/SNF___/Other________/Unknown at this time________    Blake Franco MD  Spectra: [phone]         64-year-old male with history of alcohol use disorder, throat cancer in remission, COPD not on home O2, active smoker ( 80 pack years) presents with acute alcohol intoxication. Pt states he has been drinking whiskey around the clock ( 4-6 glasses/ day) for the past 2-3 weeks without drinking any water or having any food. He states he was weaned off the morphine ( which was prescibed to him for throat pain) 2-3 weeks ago and since then he has been drinking alcohol to relieve pain.    Alcohol withdrawal   Alcohol use disorder  Starvation ketosis  SERVANDO  Hyponatremia, resolved  COPD  Tobacco use  H/O throat Ca   Anemia              PLAN:    ·	Tele reviewed. No events  ·	EKG on admission: NSR 90/min (Interpreted by me)  ·	Alcohol level is <10  ·	CIWA score zero today  ·	Ativan 2 mg ivp q 2h prn for withdrawal.   ·	S/P one unit of PRBC's. H/H is stable  ·	Monitor H/H and keep Hb >7  ·	Cont Protonix IV 40 mg q 12h  ·	GI f/u noted. Will schedule him for EGD/Colonoscopy on Thursday or Friday  ·	ENT eval noted. FFL at bedside shows + post radiation edema noted to the epiglottis and arytenoids b/l. + edema/fullness noted to the posterior cricoid space, no overt masses noted. No bleeding or old blood noted in larynx  ·	CT neck soft tissue noted. No acute pathology.   ·	Pain management eval noted. Recommended Roxanol solution 10mg q4h PRN for pain  ·	Stool guaiac. Serum iron is 63 and percent sat is 34  ·	Cont MVI, Thiamine and Folic acid.   ·	Monitor electrolytes and renal function. Cr today is 1.7  ·	CATCH team consult  ·	Tobacco use. Started him on Nicotine patch  ·	PT eval    Progress Note Handoff    Pending (specify):  Consults__CATCH team, GI f/u______, Tests________, Test Results_______, Other Pending EGD/Colonoscopy ________  Family discussion:  Disposition: Home___/SNF___/Other________/Unknown at this time________    Blake Franco MD  Spectra: [phone]

## 2024-05-29 NOTE — PROGRESS NOTE ADULT - SUBJECTIVE AND OBJECTIVE BOX
ENT DAILY PROGRESS NOTE    Pt is a 64y Male with PMH including throat ca s/p CCRT p/w melena a/w melena & ETOH withdrawal - ENT following for throat pain.  Pt. seen and examined in NAD, eating breakfast comfortably, reports pain with swallowing otherwise no acute events overnight.      REVIEW OF SYSTEMS   [x] A ten-point review of systems was otherwise negative except as noted.      Allergies: NKDA       MEDICATIONS:  acetaminophen     Tablet .. 650 milliGRAM(s) Oral every 6 hours PRN  aluminum hydroxide/magnesium hydroxide/simethicone Suspension 30 milliLiter(s) Oral every 4 hours PRN  benzocaine 20% Spray 1 Spray(s) Topical three times a day PRN  bisacodyl 5 milliGRAM(s) Oral at bedtime  enoxaparin Injectable 40 milliGRAM(s) SubCutaneous every 24 hours  folic acid 1 milliGRAM(s) Oral daily  LORazepam   Injectable 2 milliGRAM(s) IV Push every 2 hours PRN  melatonin 3 milliGRAM(s) Oral at bedtime PRN  morphine Concentrate 10 milliGRAM(s) Oral every 4 hours PRN  multivitamin 1 Tablet(s) Oral daily  nicotine - 21 mG/24Hr(s) Patch 1 Patch Transdermal daily  ondansetron Injectable 4 milliGRAM(s) IV Push every 8 hours PRN  pantoprazole  Injectable 40 milliGRAM(s) IV Push two times a day  polyethylene glycol 3350 17 Gram(s) Oral <User Schedule>  senna 2 Tablet(s) Oral at bedtime  thiamine 100 milliGRAM(s) Oral daily      Vital Signs Last 24 Hrs  T(C): 36.7 (29 May 2024 12:00), Max: 36.8 (29 May 2024 04:20)  T(F): 98 (29 May 2024 12:00), Max: 98.3 (29 May 2024 04:20)  HR: 82 (29 May 2024 12:00) (73 - 82)  BP: 150/69 (29 May 2024 12:00) (132/70 - 150/69)  BP(mean): --  RR: 18 (29 May 2024 12:00) (18 - 18)  SpO2: 100% (29 May 2024 12:00) (100% - 100%)          05-28 @ 07:01  -  05-29 @ 07:00  --------------------------------------------------------  IN:    Oral Fluid: 352 mL  Total IN: 352 mL    OUT:    Voided (mL): 850 mL  Total OUT: 850 mL    Total NET: -498 mL      05-29 @ 07:01  -  05-29 @ 12:31  --------------------------------------------------------  IN:    Oral Fluid: 236 mL  Total IN: 236 mL    OUT:    Voided (mL): 410 mL  Total OUT: 410 mL    Total NET: -174 mL          PHYSICAL EXAM:  GEN: NAD, awake and alert. No drooling or pooling of secretions. No stridor or stertor.   SKIN: Good color, non diaphoretic  HEENT: Oral mucosa pink and moist. No erythema or edema noted to buccal mucosa, tongue, FOM, uvula or posterior oropharynx. Uvula midline  NECK: post radiation changes to the anterior and lateral  neck with mild ttp.    RESP: Non-labored breathing. No use of accessory muscles.  CARDIO: +S1/S2  ABDO: Soft, NT.  EXT: GONZALEZ x 4    LABS:  CBC-                        7.7    4.67  )-----------( 136      ( 29 May 2024 07:15 )             22.5     BMP/CMP-  29 May 2024 07:15    138    |  99     |  22     ----------------------------<  155    4.2     |  29     |  1.7      Ca    9.0        29 May 2024 07:15  Mg     1.7       29 May 2024 07:15    TPro  5.0    /  Alb  3.2    /  TBili  0.3    /  DBili  x      /  AST  72     /  ALT  94     /  AlkPhos  69     29 May 2024 07:15    Coagulation Studies-    Endocrine Panel-  Calcium: 9.0 mg/dL (05-29 @ 07:15)      RADIOLOGY & ADDITIONAL STUDIES:    < from: CT Neck Soft Tissue w/ IV Cont (05.28.24 @ 16:29) >    ACC: [id number] EXAM:  CT NECK SOFT TISSUE IC   ORDERED BY: KOFFI COCHRAN     PROCEDURE DATE:  05/28/2024          INTERPRETATION:  INDICATIONS:  Throat cancer status post chemotherapy and   radiation. GI bleed.    TECHNIQUE:   Axial images of the neck were obtained following the   intravenous administration of contrast material. Sagittal and coronal   reformatted images were obtained. 100cc Omnipaque 350 was administered. 0   cc was discarded    COMPARISON EXAMINATION:  None.    FINDINGS:  AERODIGESTIVE TRACT: There is diffuse radiation therapy changes of the   upper aerodigestive tract with mucosal edema noted    There is no abnormal nodular or masslike enhancement along the visualized   aerodigestive tract.    LYMPH NODES:  No adenopathy.    PAROTID GLANDS:  Normal.    SUBMANDIBULAR GLANDS:  Normal.    THYROID GLAND:  There is a heterogeneous right thyroid lobe nodule which   measures approximately 1.4 cm, series 3 image 106.    VISUALIZED PARANASAL SINUSES:  Clear.    VISUALIZED TYMPANOMASTOID CAVITIES: Clear.    BONES:  Normal.    MISCELLANEOUS:  None.      IMPRESSION:  No acute pathology in the neck.    Posttreatment related changes noted along the upper aerodigestive tract   without evidence for nodular or masslike enhancement to suggest neoplasm.    --- End of Report ---      RADHA CHRISTINE MD; Attending Radiologist  This document has been electronically signed. May 28 2024  4:44PM    < end of copied text >

## 2024-05-29 NOTE — PROGRESS NOTE ADULT - SUBJECTIVE AND OBJECTIVE BOX
Gastroenterology progress note:     Patient is a 64y old  Male who presents with a chief complaint of Alcohol intoxication (29 May 2024 07:29)       Admitted on: 05-24-24    We are following the patient for: macrocytic anemia       Interval History: s/p CT neck with post radiation changes      PAST MEDICAL & SURGICAL HISTORY:  Throat cancer      HTN (hypertension)          MEDICATIONS  (STANDING):  bisacodyl 5 milliGRAM(s) Oral at bedtime  enoxaparin Injectable 40 milliGRAM(s) SubCutaneous every 24 hours  folic acid 1 milliGRAM(s) Oral daily  multivitamin 1 Tablet(s) Oral daily  nicotine - 21 mG/24Hr(s) Patch 1 Patch Transdermal daily  pantoprazole  Injectable 40 milliGRAM(s) IV Push two times a day  polyethylene glycol 3350 17 Gram(s) Oral daily  polyethylene glycol/electrolyte Solution. 4000 milliLiter(s) Oral once  senna 2 Tablet(s) Oral at bedtime  thiamine 100 milliGRAM(s) Oral daily    MEDICATIONS  (PRN):  acetaminophen     Tablet .. 650 milliGRAM(s) Oral every 6 hours PRN Temp greater or equal to 38C (100.4F), Mild Pain (1 - 3)  aluminum hydroxide/magnesium hydroxide/simethicone Suspension 30 milliLiter(s) Oral every 4 hours PRN Dyspepsia  benzocaine 20% Spray 1 Spray(s) Topical three times a day PRN per patient request  LORazepam   Injectable 2 milliGRAM(s) IV Push every 2 hours PRN CIWA-Ar score increase by 2 points and a total score of 7 or less  melatonin 3 milliGRAM(s) Oral at bedtime PRN Insomnia  morphine Concentrate 10 milliGRAM(s) Oral every 4 hours PRN Moderate Pain (4 - 6)  ondansetron Injectable 4 milliGRAM(s) IV Push every 8 hours PRN Nausea and/or Vomiting      Allergies  No Known Allergies      Review of Systems:   Cardiovascular:  No Chest Pain, No Palpitations  Respiratory:  No Cough, No Dyspnea  Gastrointestinal:  As described in HPI  Skin:  No Skin Lesions, No Jaundice  Neuro:  No Syncope, No Dizziness    Physical Examination:  T(C): 36.8 (05-29-24 @ 04:20), Max: 36.8 (05-29-24 @ 04:20)  HR: 80 (05-29-24 @ 04:20) (73 - 80)  BP: 137/66 (05-29-24 @ 04:20) (132/70 - 137/66)  RR: 18 (05-29-24 @ 04:20) (18 - 18)  SpO2: --      05-28-24 @ 07:01  -  05-29-24 @ 07:00  --------------------------------------------------------  IN: 352 mL / OUT: 850 mL / NET: -498 mL    05-29-24 @ 07:01  -  05-29-24 @ 09:08  --------------------------------------------------------  IN: 0 mL / OUT: 210 mL / NET: -210 mL        GENERAL: AAOx3, no acute distress.  HEAD:  Atraumatic, Normocephalic  EYES: conjunctiva and sclera clear  NECK: Supple, no JVD or thyromegaly  CHEST/LUNG: Clear to auscultation bilaterally; No wheeze, rhonchi, or rales  HEART: Regular rate and rhythm; normal S1, S2,   ABDOMEN: Soft, nontender, nondistended; Bowel sounds present  NEUROLOGY: No asterixis or tremor.   SKIN: Intact, no jaundice     Data:                        7.7    4.67  )-----------( 136      ( 29 May 2024 07:15 )             22.5     Hgb trend:  7.7  05-29-24 @ 07:15  8.3  05-28-24 @ 06:04  8.0  05-27-24 @ 21:58  6.4  05-27-24 @ 06:40      05-27-24 @ 07:01  -  05-28-24 @ 07:00  --------------------------------------------------------  IN: 310 mL      05-29    138  |  99  |  22<H>  ----------------------------<  155<H>  4.2   |  29  |  1.7<H>    Ca    9.0      29 May 2024 07:15  Mg     1.7     05-29    TPro  5.0<L>  /  Alb  3.2<L>  /  TBili  0.3  /  DBili  x   /  AST  72<H>  /  ALT  94<H>  /  AlkPhos  69  05-29    Liver panel trend:  TBili 0.3   /   AST 72   /   ALT 94   /   AlkP 69   /   Tptn 5.0   /   Alb 3.2    /   DBili --      05-29  TBili 0.4   /      /   ALT 84   /   AlkP 74   /   Tptn 4.9   /   Alb 3.4    /   DBili --      05-28  TBili 0.3   /   AST 60   /   ALT 38   /   AlkP 70   /   Tptn 4.8   /   Alb 3.2    /   DBili --      05-27  TBili 0.5   /   AST 30   /   ALT 18   /   AlkP 82   /   Tptn 5.3   /   Alb 3.6    /   DBili --      05-26  TBili 0.8   /   AST 24   /   ALT 16   /   AlkP 88   /   Tptn 5.7   /   Alb 3.9    /   DBili --      05-25  TBili 0.6   /   AST 23   /   ALT 16   /   AlkP 90   /   Tptn 5.6   /   Alb 4.0    /   DBili --      05-25  TBili 0.6   /   AST 30   /   ALT 21   /   AlkP 115   /   Tptn 7.0   /   Alb 4.6    /   DBili --      05-24             Radiology:

## 2024-05-29 NOTE — PROGRESS NOTE ADULT - ASSESSMENT
Pt is a 64y Male with PMH including throat ca s/p CCRT p/w melena a/w melena & ETOH withdrawal - ENT following for throat pain.  CT Neck findings: No acute pathology in the neck. Posttreatment related changes noted along the upper aerodigestive tract without evidence for nodular or masslike enhancement to suggest neoplasm.    Plan:  No acute ENT intervention needed at this time   Cleared from ENT standpoint for EGD with anesthesia by GI  Analgesia prn x pain   Smoking cessation   Pt is no longer wishes to follow with Dr Kauffman or Dr Daly - pt interested in establishing care with Dr Collado upon D/C home.   Discussed with Dr. Collado

## 2024-05-29 NOTE — PROGRESS NOTE ADULT - SUBJECTIVE AND OBJECTIVE BOX
HPI:  64-year-old male with history of alcohol use disorder, throat cancer in remission, COPD not on home O2, active smoker ( 80 pack years) presents with acute alcohol intoxication. Pt states he has been drinking whiskey around the clock ( 4-6 glasses/ day) for the past 2-3 weeks without drinking any water or having any food. He states he was weaned off the morphine ( which was prescibed to him for throat pain) 2-3 weeks ago and since then he has been drinking alcohol to relieve pain. He today realized he is reaching to the point of no return and  wanted to go to rehab but was told to come to ED instead. He admits his last drink was 1 PM on 5/24/24. He endorses feeling nauseous.  Patient denies any trauma falls numbness tingling weakness vomiting. Denies history of alcohol withdrawal seizure. Denies any other drug use.     In ED vitals were  T(F): 97.8  HR: 76  BP: 136/86  RR: 18  SpO2: 97%    Labs were significant for Hgb 10.3, .8, Na+ 132, FS 59, Cl 88, Bicab 16, AG 28, BUN 49, Cr 1.5 ( baseline 1.0), lipase 109, serum osm 327,     VBG: pH 7.33, lactate 3.1, pCO2 34,     CTAP: Hepatic steatosis     EKG: NSR     Pt received 2 L IVF, IV thiamine, zofran, famotidine in ED. Pt is being admitted to medicine for further management.  (24 May 2024 23:56)    Patient reports that he was seeing palliative care via telehealth prior to admission and used to be on fentanyl patch and morphine. After shared decision-making, patient and doctor decided to wean off opioids. Patient states that throat pain resumed a few days after his opioids were weaned off.     Interval history:  Patient reports that pain is improved with roxanol solution.     PERTINENT PM/SXH:   Throat cancer    HTN (hypertension)        FAMILY HISTORY:    ITEMS NOT CHECKED ARE NOT PRESENT    SOCIAL HISTORY:   Significant other/partner[x ]  Children[ ]  Alevism/Spirituality:  Substance hx:  [ ]   Tobacco hx:  [ ]   Alcohol hx: [ ]   Living Situation: [ ]Home  [ ]Long term care  [ ]Rehab [ ]Other  Home Services: [ ] HHA [ ] Visting RN [ ] Hospice  Occupation:  Home Opioid hx:  [ ] Y [ ] N [ ] I-Stop Reference No: [id number]    A	N		05/09/2024	05/09/2024	curaleaf (1:1) 2.5mgthc and 2.5 mgcbd/0.5 ml tct unflavored	1	5	Hardoon, Humberto	[id number]	[employer] [facility]  A	N	O	04/08/2024	04/09/2024	morphine sulf 10 mg/5 ml soln	105ml	21	Hardoon, Humberto	[id number]	Insurance	[facility]  A	N	O	03/13/2024	03/14/2024	morphine sulf 10 mg/5 ml soln	200ml	20	Hardoon, Humberto	[id number]	Insurance	[facility]  A	N	O	03/13/2024	03/14/2024	fentanyl 25 mcg/hr patch	10	30	Hardoon, Humberto	[id number]	[facility]  A	N	O	02/13/2024	02/16/2024	fentanyl 25 mcg/hr patch	10	30	Hardoon, Humberto	[id number]	[facility]  A	N	O	02/13/2024	02/14/2024	morphine sulf 10 mg/5 ml soln	200ml	20	Hardoon, Humberto	[id number]	Insurance	[facility]    ADVANCE DIRECTIVES:    [ ] Full Code [ ] DNR  MOLST  [ ]  Living Will  [ ]   DECISION MAKER(s):  [ ] Health Care Proxy(s)  [ ] Surrogate(s)  [ ] Guardian           Name(s): Phone Number(s):    BASELINE (I)ADL(s) (prior to admission):  [place]: [ ]Total  [ ] Moderate [ ]Dependent  Palliative Performance Status Version 2:         %    http://npcrc.org/files/news/palliative_performance_scale_[email or web address].pdf    Allergies    No Known Allergies    Intolerances    MEDICATIONS  (STANDING):  bisacodyl 5 milliGRAM(s) Oral at bedtime  enoxaparin Injectable 40 milliGRAM(s) SubCutaneous every 24 hours  folic acid 1 milliGRAM(s) Oral daily  multivitamin 1 Tablet(s) Oral daily  nicotine - 21 mG/24Hr(s) Patch 1 Patch Transdermal daily  pantoprazole  Injectable 40 milliGRAM(s) IV Push two times a day  polyethylene glycol 3350 17 Gram(s) Oral <User Schedule>  senna 2 Tablet(s) Oral at bedtime  thiamine 100 milliGRAM(s) Oral daily    MEDICATIONS  (PRN):  acetaminophen     Tablet .. 650 milliGRAM(s) Oral every 6 hours PRN Temp greater or equal to 38C (100.4F), Mild Pain (1 - 3)  aluminum hydroxide/magnesium hydroxide/simethicone Suspension 30 milliLiter(s) Oral every 4 hours PRN Dyspepsia  benzocaine 20% Spray 1 Spray(s) Topical three times a day PRN per patient request  LORazepam   Injectable 2 milliGRAM(s) IV Push every 2 hours PRN CIWA-Ar score increase by 2 points and a total score of 7 or less  melatonin 3 milliGRAM(s) Oral at bedtime PRN Insomnia  morphine Concentrate 10 milliGRAM(s) Oral every 4 hours PRN Moderate Pain (4 - 6)  ondansetron Injectable 4 milliGRAM(s) IV Push every 8 hours PRN Nausea and/or Vomiting        PRESENT SYMPTOMS: [ ]Unable to obtain due to poor mentation   Source if other than patient:  [ ]Family   [ ]Team     Pain: [x ]yes [ ]no  QOL impact -   Location - throat                   Aggravating factors - swallowing  Alleviating factors - pain meds  Quality -  Radiation - none  Timing - months  Severity (0-10 scale): 7  Minimal acceptable level (0-10 scale):     CPOT:    https://www.[email or web address]/getattachment/[email or web address]/Critical-Care-Pain-Observation-Tool-(CPOT)    PAIN AD Score:   http://geriatrictoolkit.[facility]/cog/painad.pdf     Dyspnea:                           [x ]None[ ]Mild [ ]Moderate [ ]Severe     Respiratory Distress Observation Scale (RDOS):   A score of 0 to 2 signifies little or no respiratory distress, 3 signifies mild distress, scores 4 to 6 indicate moderate distress, and scores greater than 7 signify severe distress  [email or web address]/sites/default/files/[email or web address]-tvcbixsyatd-nqodvppr-newljxsihry-dusrv.pdf    Anxiety:                             [ ]None[x ]Mild [ ]Moderate [ ]Severe   Fatigue:                             [ ]None[ ]Mild [ ]Moderate [ ]Severe   Nausea:                             [ ]None[ ]Mild [ ]Moderate [ ]Severe   Loss of appetite:              [ ]None[ ]Mild [ ]Moderate [ ]Severe   Constipation:                    [ ]None[ ]Mild [ ]Moderate [ ]Severe    Other Symptoms:  [x ]All other review of systems negative     Palliative Performance Status Version 2:         %    [email or web address]/files/[email or web address]_performance_scale_[email or web address].pdf  PHYSICAL EXAM:    GENERAL:  NAD   PULMONARY:  Non labored breathing  NEUROLOGIC: Grossly intact  BEHAVIORAL/PSYCH:  Calm.     CRITICAL CARE:  [ ] Shock Present  [ ]Septic [ ]Cardiogenic [ ]Neurologic [ ]Hypovolemic  [ ]  Vasopressors [ ]  Inotropes   [ ]Respiratory failure present [ ]Mechanical ventilation [ ]Non-invasive ventilatory support [ ]High flow  [ ]Acute  [ ]Chronic [ ]Hypoxic  [ ]Hypercarbic [ ]Other  [ ]Other organ failure     LABS: I have reviewed daily labs               RADIOLOGY & ADDITIONAL STUDIES: I have reviewed new imaging    PROTEIN CALORIE MALNUTRITION PRESENT: [ ]mild [ ]moderate [ ]severe [ ]underweight [ ]morbid obesity  https://www.andeal.org/vault/2440/web/files/ONC/Table_Clinical%20Characteristics%20to%20Document%20Malnutrition-White%20JV%20et%20al%152705.pdf    Height (cm): 172.7 (05-24-24 @ 16:20)  Weight (kg): 61.2 (05-24-24 @ 16:20), 68.5 (08-31-23 @ 14:37)  BMI (kg/m2): 20.5 (05-24-24 @ 16:20)    [ ]PPSV2 < or = to 30% [ ]significant weight loss  [ ]poor nutritional intake  [ ]anasarca      [ ]Artificial Nutrition      Palliative Care Spiritual/Emotional Screening Tool Question  Severity (0-4):                    OR                    [ x] Unable to determine. Will assess at later time if appropriate.  Score of 2 or greater indicates recommendation of Chaplaincy and/or SW referral  Chaplaincy Referral: [ ] Yes [ ] Refused [ ] Following     Caregiver [place]:  [ ] Yes [ ] No    OR    [x ] Unable to determine. Will assess at later time if appropriate.  Social Work Referral [ ]  Patient and Family Centered Care Referral [ ]    Anticipatory Grief Present: [ ] Yes [ ] No    OR     [ x] Unable to determine. Will assess at later time if appropriate.  Social Work Referral [ ]  Patient and Family Centered Care Referral [ ]    REFERRALS:   [ ]Chaplaincy  [ ]Hospice  [ ]Child Life  [ ]Social Work  [ ]Case management [ ]Holistic Therapy     Palliative care education provided to patient and/or family

## 2024-05-29 NOTE — PROGRESS NOTE ADULT - ASSESSMENT
64-year-old male with history of alcohol use disorder, throat cancer in remission, COPD not on home O2, smoker ( 80 pack years) presents with acute alcohol intoxication. Pt wanted to go to rehab today but was told to come to ED instead. He admits his last drink was 4 hours prior to ED arrival. He endorses feeling nauseas.  Patient admits he has not had anything to eat for the last 10 days.  Patient denies any trauma falls numbness tingling weakness vomiting. Denies history of alcohol withdrawal seizure.     #melena  #anemia  #H/O throat Ca in remission  - CT neck: No acute pathology in the neck.  Posttreatment related changes noted along the upper aerodigestive tract   without evidence for nodular or masslike enhancement to suggest neoplasm.  - s/p 1 prbc 5/27  - active t/s  - protonix bid   - ENT consulted. Video FFL- post-radiation changed & edema  - palliative following   - bowel prep for egd / colonoscopy with GI     #alcohol withdrawal   #alcohol use disorder   #starvation ketosis   - c/w [facility] protocol  - CATCH team evaluation   - Zofran PRN   - beta hydroxy butyrate neg   - Cont MVI, Thiamine and Folic acid.     #Macrocytic anemia  #Suspected thiamine deficiency   #Folic acid deficiency    - likely 2/2 chronic alcohol use   - s/p IV thiamine 500 mg   - c/w thiamine  mg daily   - c/w Folic acid supplement  - c/w daily vitamins   - Vitamin B12 wnl  - b9 low at 3.4    #SERVANDO   #Hyponatremia - resolved   - cr from 1.4, ordered 500 cc bolus  - monitor cr     #transaminitis  - similar to yesterday   - monitor for now  - consider ruq us    #COPD not on home o2  #Not in exacerbation  #Active smoker ( 80 pack year smoking hx)  - Not on any inhalers at home   - start albuterol PRN     #DVT PPx: Lovenox  #GI Ppx: PPI  #Activity: IAT  #Diet: Regular

## 2024-05-30 DIAGNOSIS — K92.1 MELENA: ICD-10-CM

## 2024-05-30 LAB
ALBUMIN SERPL ELPH-MCNC: 3.4 G/DL — LOW (ref 3.5–5.2)
ALP SERPL-CCNC: 68 U/L — SIGNIFICANT CHANGE UP (ref 30–115)
ALT FLD-CCNC: 102 U/L — HIGH (ref 0–41)
ANION GAP SERPL CALC-SCNC: 7 MMOL/L — SIGNIFICANT CHANGE UP (ref 7–14)
APTT BLD: 31.8 SEC — SIGNIFICANT CHANGE UP (ref 27–39.2)
AST SERPL-CCNC: 76 U/L — HIGH (ref 0–41)
BASOPHILS # BLD AUTO: 0.01 K/UL — SIGNIFICANT CHANGE UP (ref 0–0.2)
BASOPHILS NFR BLD AUTO: 0.3 % — SIGNIFICANT CHANGE UP (ref 0–1)
BILIRUB SERPL-MCNC: 0.2 MG/DL — SIGNIFICANT CHANGE UP (ref 0.2–1.2)
BLD GP AB SCN SERPL QL: SIGNIFICANT CHANGE UP
BUN SERPL-MCNC: 23 MG/DL — HIGH (ref 10–20)
CALCIUM SERPL-MCNC: 8.9 MG/DL — SIGNIFICANT CHANGE UP (ref 8.4–10.5)
CHLORIDE SERPL-SCNC: 100 MMOL/L — SIGNIFICANT CHANGE UP (ref 98–110)
CO2 SERPL-SCNC: 31 MMOL/L — SIGNIFICANT CHANGE UP (ref 17–32)
CREAT SERPL-MCNC: 1.6 MG/DL — HIGH (ref 0.7–1.5)
EGFR: 48 ML/MIN/1.73M2 — LOW
EOSINOPHIL # BLD AUTO: 0.03 K/UL — SIGNIFICANT CHANGE UP (ref 0–0.7)
EOSINOPHIL NFR BLD AUTO: 0.8 % — SIGNIFICANT CHANGE UP (ref 0–8)
GLUCOSE SERPL-MCNC: 93 MG/DL — SIGNIFICANT CHANGE UP (ref 70–99)
HCT VFR BLD CALC: 23.3 % — LOW (ref 42–52)
HGB BLD-MCNC: 7.9 G/DL — LOW (ref 14–18)
IMM GRANULOCYTES NFR BLD AUTO: 2.9 % — HIGH (ref 0.1–0.3)
INR BLD: 0.87 RATIO — SIGNIFICANT CHANGE UP (ref 0.65–1.3)
LYMPHOCYTES # BLD AUTO: 0.42 K/UL — LOW (ref 1.2–3.4)
LYMPHOCYTES # BLD AUTO: 11.1 % — LOW (ref 20.5–51.1)
MAGNESIUM SERPL-MCNC: 2 MG/DL — SIGNIFICANT CHANGE UP (ref 1.8–2.4)
MCHC RBC-ENTMCNC: 33.9 G/DL — SIGNIFICANT CHANGE UP (ref 32–37)
MCHC RBC-ENTMCNC: 35.7 PG — HIGH (ref 27–31)
MCV RBC AUTO: 105.4 FL — HIGH (ref 80–94)
MONOCYTES # BLD AUTO: 0.94 K/UL — HIGH (ref 0.1–0.6)
MONOCYTES NFR BLD AUTO: 24.9 % — HIGH (ref 1.7–9.3)
NEUTROPHILS # BLD AUTO: 2.26 K/UL — SIGNIFICANT CHANGE UP (ref 1.4–6.5)
NEUTROPHILS NFR BLD AUTO: 60 % — SIGNIFICANT CHANGE UP (ref 42.2–75.2)
NRBC # BLD: 0 /100 WBCS — SIGNIFICANT CHANGE UP (ref 0–0)
PLATELET # BLD AUTO: 145 K/UL — SIGNIFICANT CHANGE UP (ref 130–400)
PMV BLD: 8.9 FL — SIGNIFICANT CHANGE UP (ref 7.4–10.4)
POTASSIUM SERPL-MCNC: 4.6 MMOL/L — SIGNIFICANT CHANGE UP (ref 3.5–5)
POTASSIUM SERPL-SCNC: 4.6 MMOL/L — SIGNIFICANT CHANGE UP (ref 3.5–5)
PROT SERPL-MCNC: 5.1 G/DL — LOW (ref 6–8)
PROTHROM AB SERPL-ACNC: 9.9 SEC — LOW (ref 9.95–12.87)
RBC # BLD: 2.21 M/UL — LOW (ref 4.7–6.1)
RBC # FLD: 17.2 % — HIGH (ref 11.5–14.5)
SODIUM SERPL-SCNC: 138 MMOL/L — SIGNIFICANT CHANGE UP (ref 135–146)
WBC # BLD: 3.77 K/UL — LOW (ref 4.8–10.8)
WBC # FLD AUTO: 3.77 K/UL — LOW (ref 4.8–10.8)

## 2024-05-30 PROCEDURE — 99233 SBSQ HOSP IP/OBS HIGH 50: CPT

## 2024-05-30 PROCEDURE — 99233 SBSQ HOSP IP/OBS HIGH 50: CPT | Mod: 25

## 2024-05-30 PROCEDURE — 31575 DIAGNOSTIC LARYNGOSCOPY: CPT

## 2024-05-30 RX ORDER — PANTOPRAZOLE SODIUM 20 MG/1
40 TABLET, DELAYED RELEASE ORAL
Refills: 0 | Status: DISCONTINUED | OUTPATIENT
Start: 2024-05-30 | End: 2024-05-31

## 2024-05-30 RX ORDER — SOD SULF/SODIUM/NAHCO3/KCL/PEG
4000 SOLUTION, RECONSTITUTED, ORAL ORAL ONCE
Refills: 0 | Status: COMPLETED | OUTPATIENT
Start: 2024-05-30 | End: 2024-05-30

## 2024-05-30 RX ORDER — SODIUM CHLORIDE 9 MG/ML
1000 INJECTION, SOLUTION INTRAVENOUS
Refills: 0 | Status: DISCONTINUED | OUTPATIENT
Start: 2024-05-30 | End: 2024-05-31

## 2024-05-30 RX ADMIN — Medication 4000 MILLILITER(S): at 08:16

## 2024-05-30 RX ADMIN — MORPHINE SULFATE 10 MILLIGRAM(S): 50 CAPSULE, EXTENDED RELEASE ORAL at 12:09

## 2024-05-30 RX ADMIN — Medication 100 MILLIGRAM(S): at 12:04

## 2024-05-30 RX ADMIN — MORPHINE SULFATE 10 MILLIGRAM(S): 50 CAPSULE, EXTENDED RELEASE ORAL at 21:00

## 2024-05-30 RX ADMIN — Medication 1 PATCH: at 12:42

## 2024-05-30 RX ADMIN — Medication 1 PATCH: at 07:22

## 2024-05-30 RX ADMIN — PANTOPRAZOLE SODIUM 40 MILLIGRAM(S): 20 TABLET, DELAYED RELEASE ORAL at 05:36

## 2024-05-30 RX ADMIN — Medication 1 PATCH: at 11:08

## 2024-05-30 RX ADMIN — Medication 2 MILLIGRAM(S): at 08:36

## 2024-05-30 RX ADMIN — POLYETHYLENE GLYCOL 3350 17 GRAM(S): 17 POWDER, FOR SOLUTION ORAL at 07:56

## 2024-05-30 RX ADMIN — MORPHINE SULFATE 10 MILLIGRAM(S): 50 CAPSULE, EXTENDED RELEASE ORAL at 12:04

## 2024-05-30 RX ADMIN — PANTOPRAZOLE SODIUM 40 MILLIGRAM(S): 20 TABLET, DELAYED RELEASE ORAL at 17:22

## 2024-05-30 RX ADMIN — Medication 1 TABLET(S): at 12:04

## 2024-05-30 RX ADMIN — Medication 1 MILLIGRAM(S): at 12:04

## 2024-05-30 RX ADMIN — POLYETHYLENE GLYCOL 3350 17 GRAM(S): 17 POWDER, FOR SOLUTION ORAL at 12:03

## 2024-05-30 RX ADMIN — MORPHINE SULFATE 10 MILLIGRAM(S): 50 CAPSULE, EXTENDED RELEASE ORAL at 20:16

## 2024-05-30 RX ADMIN — MORPHINE SULFATE 10 MILLIGRAM(S): 50 CAPSULE, EXTENDED RELEASE ORAL at 07:56

## 2024-05-30 RX ADMIN — ENOXAPARIN SODIUM 40 MILLIGRAM(S): 100 INJECTION SUBCUTANEOUS at 05:36

## 2024-05-30 RX ADMIN — SODIUM CHLORIDE 75 MILLILITER(S): 9 INJECTION, SOLUTION INTRAVENOUS at 12:03

## 2024-05-30 RX ADMIN — Medication 0.5 MILLIGRAM(S): at 17:22

## 2024-05-30 RX ADMIN — POLYETHYLENE GLYCOL 3350 17 GRAM(S): 17 POWDER, FOR SOLUTION ORAL at 17:23

## 2024-05-30 NOTE — PROGRESS NOTE ADULT - SUBJECTIVE AND OBJECTIVE BOX
KRAIG DENNIS  64y Male    CHIEF COMPLAINT:    Patient is a 64y old  Male who presents with a chief complaint of Alcohol intoxication (30 May 2024 07:35)      INTERVAL HPI/OVERNIGHT EVENTS:    Patient seen and examined. EGD/Colonoscopy was cancelled as pt not fully prepared for the procedure    ROS: All other systems are negative.    Vital Signs:    T(F): 97 (24 @ 08:27), Max: 98 (24 @ 12:00)  HR: 81 (24 @ 08:27) (72 - 82)  BP: 168/81 (24 @ 08:27) (125/63 - 168/81)  RR: 18 (24 @ 08:27) (18 - 18)  SpO2: 98% (24 @ 08:27) (98% - 100%)  I&O's Summary    29 May 2024 07:01  -  30 May 2024 07:00  --------------------------------------------------------  IN: 458 mL / OUT: 712 mL / NET: -254 mL      Daily     Daily Weight in k.9 (30 May 2024 04:59)  CAPILLARY BLOOD GLUCOSE          PHYSICAL EXAM:    GENERAL:  NAD  SKIN: No rashes or lesions  HENT: Atraumatic. Normocephalic. PERRL. Moist membranes.  NECK: Supple, No JVD. No lymphadenopathy.  PULMONARY: CTA B/L. No wheezing. No rales  CVS: Normal S1, S2. Rate and Rhythm are regular. No murmurs.  ABDOMEN/GI: Soft, Nontender, Nondistended; BS present  EXTREMITIES: Peripheral pulses intact. No edema B/L LE.  NEUROLOGIC:  No motor or sensory deficit.  PSYCH: Alert & oriented x 3    Consultant(s) Notes Reviewed:  [x ] YES  [ ] NO  Care Discussed with Consultants/Other Providers [ x] YES  [ ] NO    EKG reviewed  Telemetry reviewed    LABS:                        7.9    3.77  )-----------( 145      ( 30 May 2024 00:37 )             23.3   Hemoglobin: 7.9 g/dL ( @ 00:37)  Hemoglobin: 7.7 g/dL ( @ 07:15)  Hemoglobin: 8.3 g/dL ( @ 06:04)  Hemoglobin: 8.0 g/dL ( @ 21:58)  Hemoglobin: 6.4 g/dL ( @ 06:40)  Hemoglobin: 7.6 g/dL ( @ 06:55)        138  |  100  |  23<H>  ----------------------------<  93   Creatinine Trend: 1.6<--, 1.7<--, 1.4<--, 1.3<--, 1.4<--, 1.5<--  4.6   |  31  |  1.6<H>    Ca    8.9      30 May 2024 00:37  Mg     2.0         TPro  5.1<L>  /  Alb  3.4<L>  /  TBili  0.2  /  DBili  x   /  AST  76<H>  /  ALT  102<H>  /  AlkPhos  68      PT/INR - ( 30 May 2024 00:37 )   PT: 9.90 sec;   INR: 0.87 ratio         PTT - ( 30 May 2024 00:37 )  PTT:31.8 sec          RADIOLOGY & ADDITIONAL TESTS:      Imaging or report Personally Reviewed:  [ ] YES  [ ] NO    Medications:  Standing  bisacodyl 20 milliGRAM(s) Oral at bedtime  enoxaparin Injectable 40 milliGRAM(s) SubCutaneous every 24 hours  folic acid 1 milliGRAM(s) Oral daily  multivitamin 1 Tablet(s) Oral daily  nicotine - 21 mG/24Hr(s) Patch 1 Patch Transdermal daily  pantoprazole    Tablet 40 milliGRAM(s) Oral two times a day  polyethylene glycol 3350 17 Gram(s) Oral <User Schedule>  senna 2 Tablet(s) Oral at bedtime  thiamine 100 milliGRAM(s) Oral daily    PRN Meds  acetaminophen     Tablet .. 650 milliGRAM(s) Oral every 6 hours PRN  aluminum hydroxide/magnesium hydroxide/simethicone Suspension 30 milliLiter(s) Oral every 4 hours PRN  benzocaine 20% Spray 1 Spray(s) Topical three times a day PRN  LORazepam   Injectable 2 milliGRAM(s) IV Push every 2 hours PRN  melatonin 3 milliGRAM(s) Oral at bedtime PRN  morphine Concentrate 10 milliGRAM(s) Oral every 4 hours PRN  ondansetron Injectable 4 milliGRAM(s) IV Push every 8 hours PRN      Case discussed with resident    Care discussed with pt/family

## 2024-05-30 NOTE — PROGRESS NOTE ADULT - SUBJECTIVE AND OBJECTIVE BOX
SUBJECTIVE:    Patient is a 64y old Male who presents with a chief complaint of Alcohol intoxication (30 May 2024 10:12)    Currently admitted to medicine with the primary diagnosis of:    Today is hospital day 6d.     Overnight Events:     no overnight events  egd/colonoscopy betito     PAST MEDICAL & SURGICAL HISTORY  Throat cancer    HTN (hypertension)        ALLERGIES:  No Known Allergies    MEDICATIONS:  STANDING MEDICATIONS  bisacodyl 20 milliGRAM(s) Oral at bedtime  enoxaparin Injectable 40 milliGRAM(s) SubCutaneous every 24 hours  folic acid 1 milliGRAM(s) Oral daily  lactated ringers. 1000 milliLiter(s) IV Continuous <Continuous>  multivitamin 1 Tablet(s) Oral daily  nicotine - 21 mG/24Hr(s) Patch 1 Patch Transdermal daily  pantoprazole    Tablet 40 milliGRAM(s) Oral two times a day  polyethylene glycol 3350 17 Gram(s) Oral <User Schedule>  senna 2 Tablet(s) Oral at bedtime  thiamine 100 milliGRAM(s) Oral daily    PRN MEDICATIONS  acetaminophen     Tablet .. 650 milliGRAM(s) Oral every 6 hours PRN  aluminum hydroxide/magnesium hydroxide/simethicone Suspension 30 milliLiter(s) Oral every 4 hours PRN  benzocaine 20% Spray 1 Spray(s) Topical three times a day PRN  LORazepam   Injectable 2 milliGRAM(s) IV Push every 2 hours PRN  melatonin 3 milliGRAM(s) Oral at bedtime PRN  morphine Concentrate 10 milliGRAM(s) Oral every 4 hours PRN  ondansetron Injectable 4 milliGRAM(s) IV Push every 8 hours PRN    VITALS:   ICU Vital Signs Last 24 Hrs  T(C): 36.1 (30 May 2024 08:27), Max: 36.7 (29 May 2024 12:00)  T(F): 97 (30 May 2024 08:27), Max: 98 (29 May 2024 12:00)  HR: 81 (30 May 2024 08:27) (72 - 82)  BP: 168/81 (30 May 2024 08:27) (125/63 - 168/81)  BP(mean): 98 (30 May 2024 04:59) (98 - 98)  ABP: --  ABP(mean): --  RR: 18 (30 May 2024 08:27) (18 - 18)  SpO2: 98% (30 May 2024 08:27) (98% - 100%)    O2 Parameters below as of 30 May 2024 04:59  Patient On (Oxygen Delivery Method): room air            LABS:                        7.9    3.77  )-----------( 145      ( 30 May 2024 00:37 )             23.3     05-30    138  |  100  |  23<H>  ----------------------------<  93  4.6   |  31  |  1.6<H>    Ca    8.9      30 May 2024 00:37  Mg     2.0     05-30    TPro  5.1<L>  /  Alb  3.4<L>  /  TBili  0.2  /  DBili  x   /  AST  76<H>  /  ALT  102<H>  /  AlkPhos  68  05-30    PT/INR - ( 30 May 2024 00:37 )   PT: 9.90 sec;   INR: 0.87 ratio         PTT - ( 30 May 2024 00:37 )  PTT:31.8 sec  Urinalysis Basic - ( 30 May 2024 00:37 )    Color: x / Appearance: x / SG: x / pH: x  Gluc: 93 mg/dL / Ketone: x  / Bili: x / Urobili: x   Blood: x / Protein: x / Nitrite: x   Leuk Esterase: x / RBC: x / WBC x   Sq Epi: x / Non Sq Epi: x / Bacteria: x                  RADIOLOGY:  no new rads reads   PHYSICAL EXAM:  GEN: in bed   LUNGS: no resp distress   HEART: s1 s2  ABD: nontender   EXT: no lower extremity edema   NEURO: ao 3

## 2024-05-30 NOTE — PROGRESS NOTE ADULT - ASSESSMENT
64-year-old male with history of heavy alcohol use disorder, throat cancer s/p chemo/RT (follows ), chronic PATSY on IV iron/blood transfusions COPD not on home O2, active smoker ( 80 pack years) presents with acute alcohol intoxication. Pt states he has been drinking whiskey around the clock ( 1bottle / day) for the past 2-3 weeks without drinking any water or having any food. He states he was weaned off the morphine/fentanyl for throat pain 2-3 weeks ago and since then he has been drinking alcohol to relieve pain. He admits his last drink was 1 PM on 5/24/24. He endorses feeling nauseous and has symptoms of withdrawal.  Patient denies any abdominal pain. Reports dark black stool this am but no blood.  He reports odynophagia since radiation but tolerating diet currently. Reports taking advil everyday. GI consulted for reported black stool, and chronic anemia.    #Acute on Chronic macrocytic anemia with PATSY  #Alcohol withdrawal  #Reported dark stool r/o upper GI source  #Chronic odynophagia with hx of throat cancer  - Hemodynamically stable & no active bleeding  - Baseline hemoglobin - 8  - Hemoglobin on admission - 10.3>7.8>7.6>6.9>s/p prbc  - reviewed Iron studies, receives iron transfusions per patient , follows   - No AC  - NSAID+, uses morphine/fentanyl  - s/p EGD with  in 2021  - - recalled GI today as patient is agreeable  - CT neck: post radiation changes of upper aerodigestive tract  - 5/30:  scheduled for EGD/Colonoscopy but patient drank 3/4th prep , still has brown stool      #Rec  - EGD/Colonoscopy rescheduled to 5/31 as patient still did not prep (on roxanol) , will need atleast another 3/4th prep to achieve clear stools, NPO after midnight for procedure in am. Can be on clears today. Give 20mg dulcolax tonight, obtain pre op labs tonight. Correct any labs if needed.  -  c/w pantoprazole 40 PO BID (change from IV to PO ), transfuse prbc, trend cbc  - Please target Hb  >7, transfuse prn  - Please avoid any NSAIDs  - Recommend onc eval  - Maintain active Type and screen  - Trend H&H BID  - Please place x2 18G IVs  - CIWA protocol, Mg,thiamine,folate supplementation  - alcohol/smoking cessation advised    #Elevated liver enzymes likely sec AUD - trending down  T nasim 0.3   AP 69  AST 72  ALT 94  05-29-24 @ 07:15  T nasim 0.4   AP 74    ALT 84  05-28-24 @ 06:04  T nasim 0.3   AP 70  AST 60  ALT 38  05-27-24 @ 06:40  T nasim 0.5   AP 82  AST 30  ALT 18  05-26-24 @ 06:55  T nasim 0.8   AP 88  AST 24  ALT 16  05-25-24 @ 07:04    RECS:  - please obtain RUQ US  - please check hepatitis panel  - avoid hepato toxic agents/hypotension

## 2024-05-30 NOTE — PROGRESS NOTE ADULT - TIME BILLING
Coordination of care
50 minutes spent on total encounter; more than 50% of the visit was spent counseling and / or coordinating care by the attending physician.  The necessity of the time spent during the encounter on this date of service was due to: Coordination of care.

## 2024-05-30 NOTE — PROGRESS NOTE ADULT - ASSESSMENT
64-year-old male with history of alcohol use disorder, throat cancer in remission, COPD not on home O2, active smoker ( 80 pack years) presents with acute alcohol intoxication. Pt states he has been drinking whiskey around the clock ( 4-6 glasses/ day) for the past 2-3 weeks without drinking any water or having any food. He states he was weaned off the morphine ( which was prescibed to him for throat pain) 2-3 weeks ago and since then he has been drinking alcohol to relieve pain. He today realized he is reaching to the point of no return and  wanted to go to rehab but was told to come to ED instead. He admits his last drink was 1 PM on 5/24/24. He endorses feeling nauseous.  Patient denies any trauma falls numbness tingling weakness vomiting. Denies history of alcohol withdrawal seizure. Denies any other drug use.     Patient seen by ENT, FFL showing post-radiation changes and edema. Patient states that morphine is helpful and most recent prescription prior to admission was for PO morphine solution 10mg    5/30: No new complaints. Patient will ask for roxanol more frequently if he feels that he needs it (only received x2 yesterday). I discussed with patient's outpatient palliative doctor, Dr. Lu; he is aware and in agreement with restarting opioids.     Plan:  - continue roxanol solution 10mg q4h PRN for pain  - agree with senna and miralax  - discussed with patient's outpatient palliative doctor, Dr. Lu; he is aware and in agreement with restarting opioids.     Palliative care will continue to follow.   Please call [phone] with questions or concerns 24/7.   _____________  Richie Grove MD  Palliative Medicine  [facility]   of Geriatric and Palliative Medicine  [phone]

## 2024-05-30 NOTE — PROGRESS NOTE ADULT - ASSESSMENT
64-year-old male with history of alcohol use disorder, throat cancer in remission, COPD not on home O2, active smoker ( 80 pack years) presents with acute alcohol intoxication. Pt states he has been drinking whiskey around the clock ( 4-6 glasses/ day) for the past 2-3 weeks without drinking any water or having any food. He states he was weaned off the morphine ( which was prescibed to him for throat pain) 2-3 weeks ago and since then he has been drinking alcohol to relieve pain.    Alcohol withdrawal   Alcohol use disorder  Starvation ketosis  SERVANDO  Hyponatremia, resolved  COPD  Tobacco use  H/O throat Ca   Anemia              PLAN:    ·	Tele reviewed. No events  ·	EKG on admission: NSR 90/min (Interpreted by me)  ·	Alcohol level is <10  ·	CIWA score zero today  ·	Ativan 2 mg ivp q 2h prn for withdrawal.   ·	S/P one unit of PRBC's. H/H is stable  ·	Monitor H/H and keep Hb >7  ·	Switched him to Protonix 40 mg po q 12h  ·	GI f/u noted. Rescheduled for EGD/Colonoscopy on Friday  ·	Cont clear liquid diet.   ·	ENT eval noted. FFL at bedside shows + post radiation edema noted to the epiglottis and arytenoids b/l. + edema/fullness noted to the posterior cricoid space, no overt masses noted. No bleeding or old blood noted in larynx  ·	CT neck soft tissue noted. No acute pathology.   ·	Pain management eval noted. Recommended Roxanol solution 10mg q4h PRN for pain  ·	Serum iron is 63 and percent sat is 34  ·	Cont MVI, Thiamine and Folic acid.   ·	Monitor electrolytes and renal function. Cr today is 1.7  ·	CATCH team consult  ·	Tobacco use. Started him on Nicotine patch  ·	PT eval    Progress Note Handoff    Pending (specify):  Consults__CATCH team, GI f/u______, Tests________, Test Results_______, Other Pending EGD/Colonoscopy ________  Family discussion:  Disposition: Home___/SNF___/Other________/Unknown at this time________    Blake Franco MD  Spectra: [phone]

## 2024-05-30 NOTE — PROGRESS NOTE ADULT - ASSESSMENT
64-year-old male with history of alcohol use disorder, throat cancer in remission, COPD not on home O2, smoker ( 80 pack years) presents with acute alcohol intoxication. Pt wanted to go to rehab today but was told to come to ED instead. He admits his last drink was 4 hours prior to ED arrival. He endorses feeling nauseas.  Patient admits he has not had anything to eat for the last 10 days.  Patient denies any trauma falls numbness tingling weakness vomiting. Denies history of alcohol withdrawal seizure.     #melena  #anemia  #H/O throat Ca in remission  - CT neck: No acute pathology in the neck.  Posttreatment related changes noted along the upper aerodigestive tract   without evidence for nodular or masslike enhancement to suggest neoplasm.  - s/p 1 prbc 5/27  - active t/s  - protonix bid   - ENT consulted. Video FFL- post-radiation changed & edema  - palliative following   - bowel prep for egd / colonoscopy with GI betito    #alcohol withdrawal   #alcohol use disorder   #starvation ketosis   - c/w [facility] protocol  - CATCH team evaluation   - Zofran PRN   - beta hydroxy butyrate neg   - Cont MVI, Thiamine and Folic acid.     #Macrocytic anemia  #Suspected thiamine deficiency   #Folic acid deficiency    - likely 2/2 chronic alcohol use   - s/p IV thiamine 500 mg   - c/w thiamine  mg daily   - c/w Folic acid supplement  - c/w daily vitamins   - Vitamin B12 wnl  - b9 low at 3.4    #SERVANDO   #Hyponatremia - resolved   - ivf for now   - monitor cr     #transaminitis  - ruq us  - hepatitis panel     #COPD not on home o2  #Not in exacerbation  #Active smoker ( 80 pack year smoking hx)  - Not on any inhalers at home   - start albuterol PRN     #DVT PPx: Lovenox  #GI Ppx: PPI  #Activity: IAT

## 2024-05-30 NOTE — PROGRESS NOTE ADULT - ATTENDING COMMENTS
Pt with intermittent dark stools and odynophagia. Hb stable. ENT note reviewed and appreciated. Plan for EGD/colonoscopy tentatively tomorrow.
Pt with intermittent dark stools and odynophagia pending CT neck in setting of h/o throat ca/radiation. Continue PPI for now. Pending results and ENT recommendations will plan for EGD/colonoscopy.
pending endoscopic work up
Pt with intermittent dark stools and odynophagia. ENT note reviewed. Plan for EGD/colonoscopy.

## 2024-05-30 NOTE — CONSULT NOTE ADULT - ASSESSMENT
pt still smokes and drinls a lot   came in for alcohal intoxication   h/o throat cancer  in remission   all lab work reviewed   hb low with high MCV   ANEMIA multifactorial    poor nutrion,folate,iron and thiamine def   need to  be in a facility for rehab  throat cancer history  high risk for recurrence    cnt all present symptomatic treatment   will follow with you     karl blackman MD

## 2024-05-30 NOTE — PROGRESS NOTE ADULT - SUBJECTIVE AND OBJECTIVE BOX
HPI:  64-year-old male with history of alcohol use disorder, throat cancer in remission, COPD not on home O2, active smoker ( 80 pack years) presents with acute alcohol intoxication. Pt states he has been drinking whiskey around the clock ( 4-6 glasses/ day) for the past 2-3 weeks without drinking any water or having any food. He states he was weaned off the morphine ( which was prescibed to him for throat pain) 2-3 weeks ago and since then he has been drinking alcohol to relieve pain. He today realized he is reaching to the point of no return and  wanted to go to rehab but was told to come to ED instead. He admits his last drink was 1 PM on 5/24/24. He endorses feeling nauseous.  Patient denies any trauma falls numbness tingling weakness vomiting. Denies history of alcohol withdrawal seizure. Denies any other drug use.     In ED vitals were  T(F): 97.8  HR: 76  BP: 136/86  RR: 18  SpO2: 97%    Labs were significant for Hgb 10.3, .8, Na+ 132, FS 59, Cl 88, Bicab 16, AG 28, BUN 49, Cr 1.5 ( baseline 1.0), lipase 109, serum osm 327,     VBG: pH 7.33, lactate 3.1, pCO2 34,     CTAP: Hepatic steatosis     EKG: NSR     Pt received 2 L IVF, IV thiamine, zofran, famotidine in ED. Pt is being admitted to medicine for further management.  (24 May 2024 23:56)    Patient reports that he was seeing palliative care via telehealth prior to admission and used to be on fentanyl patch and morphine. After shared decision-making, patient and doctor decided to wean off opioids. Patient states that throat pain resumed a few days after his opioids were weaned off.     Interval history:  No new complaints. Patient will ask for roxanol more frequently if he feels that he needs it (only received x2 yesterday).    PERTINENT PM/SXH:   Throat cancer    HTN (hypertension)        FAMILY HISTORY:    ITEMS NOT CHECKED ARE NOT PRESENT    SOCIAL HISTORY:   Significant other/partner[x ]  Children[ ]  Zoroastrian/Spirituality:  Substance hx:  [ ]   Tobacco hx:  [ ]   Alcohol hx: [ ]   Living Situation: [ ]Home  [ ]Long term care  [ ]Rehab [ ]Other  Home Services: [ ] HHA [ ] Visting RN [ ] Hospice  Occupation:  Home Opioid hx:  [ ] Y [ ] N [ ] I-Stop Reference No: [id number]    A	N		05/09/2024	05/09/2024	curaleaf (1:1) 2.5mgthc and 2.5 mgcbd/0.5 ml tct unflavored	1	5	Hardoon, Humberto	[id number]	[employer] [facility]  A	N	O	04/08/2024	04/09/2024	morphine sulf 10 mg/5 ml soln	105ml	21	Hardoon, Humberto	[id number]	Insurance	[facility]  A	N	O	03/13/2024	03/14/2024	morphine sulf 10 mg/5 ml soln	200ml	20	Hardoon, Humberto	[id number]	Insurance	[facility]  A	N	O	03/13/2024	03/14/2024	fentanyl 25 mcg/hr patch	10	30	Hardoon, Humberto	[id number]	[facility]  A	N	O	02/13/2024	02/16/2024	fentanyl 25 mcg/hr patch	10	30	Hardoon, Humberto	[id number]	[facility]  A	N	O	02/13/2024	02/14/2024	morphine sulf 10 mg/5 ml soln	200ml	20	Hardoon, Humberto	[id number]	Insurance	[facility]    ADVANCE DIRECTIVES:    [ ] Full Code [ ] DNR  MOLST  [ ]  Living Will  [ ]   DECISION MAKER(s):  [ ] Health Care Proxy(s)  [ ] Surrogate(s)  [ ] Guardian           Name(s): Phone Number(s):    BASELINE (I)ADL(s) (prior to admission):  [place]: [ ]Total  [ ] Moderate [ ]Dependent  Palliative Performance Status Version 2:         %    http://npcrc.org/files/news/palliative_performance_scale_[email or web address].pdf    Allergies    No Known Allergies    Intolerances    MEDICATIONS  (STANDING):  bisacodyl 5 milliGRAM(s) Oral at bedtime  enoxaparin Injectable 40 milliGRAM(s) SubCutaneous every 24 hours  folic acid 1 milliGRAM(s) Oral daily  multivitamin 1 Tablet(s) Oral daily  nicotine - 21 mG/24Hr(s) Patch 1 Patch Transdermal daily  pantoprazole  Injectable 40 milliGRAM(s) IV Push two times a day  polyethylene glycol 3350 17 Gram(s) Oral <User Schedule>  senna 2 Tablet(s) Oral at bedtime  thiamine 100 milliGRAM(s) Oral daily    MEDICATIONS  (PRN):  acetaminophen     Tablet .. 650 milliGRAM(s) Oral every 6 hours PRN Temp greater or equal to 38C (100.4F), Mild Pain (1 - 3)  aluminum hydroxide/magnesium hydroxide/simethicone Suspension 30 milliLiter(s) Oral every 4 hours PRN Dyspepsia  benzocaine 20% Spray 1 Spray(s) Topical three times a day PRN per patient request  LORazepam   Injectable 2 milliGRAM(s) IV Push every 2 hours PRN CIWA-Ar score increase by 2 points and a total score of 7 or less  melatonin 3 milliGRAM(s) Oral at bedtime PRN Insomnia  morphine Concentrate 10 milliGRAM(s) Oral every 4 hours PRN Moderate Pain (4 - 6)  ondansetron Injectable 4 milliGRAM(s) IV Push every 8 hours PRN Nausea and/or Vomiting        PRESENT SYMPTOMS: [ ]Unable to obtain due to poor mentation   Source if other than patient:  [ ]Family   [ ]Team     Pain: [x ]yes [ ]no  QOL impact -   Location - throat                   Aggravating factors - swallowing  Alleviating factors - pain meds  Quality -  Radiation - none  Timing - months  Severity (0-10 scale): 7  Minimal acceptable level (0-10 scale):     CPOT:    https://www.sccm.org/getattachment/gyx73k98-9q2p-7m6q-0e3r-6759q0817q4j/Critical-Care-Pain-Observation-Tool-(CPOT)    PAIN AD Score:   http://geriatrictoolkit.[facility]/cog/painad.pdf     Dyspnea:                           [x ]None[ ]Mild [ ]Moderate [ ]Severe     Respiratory Distress Observation Scale (RDOS):   A score of 0 to 2 signifies little or no respiratory distress, 3 signifies mild distress, scores 4 to 6 indicate moderate distress, and scores greater than 7 signify severe distress  [email or web address]/[email or web address]/files/[email or web address]-xmdkvxnyrib-gltybwrj-tnkyqwjwudf-xpxxd.pdf    Anxiety:                             [ ]None[x ]Mild [ ]Moderate [ ]Severe   Fatigue:                             [ ]None[ ]Mild [ ]Moderate [ ]Severe   Nausea:                             [ ]None[ ]Mild [ ]Moderate [ ]Severe   Loss of appetite:              [ ]None[ ]Mild [ ]Moderate [ ]Severe   Constipation:                    [ ]None[ ]Mild [ ]Moderate [ ]Severe    Other Symptoms:  [x ]All other review of systems negative     Palliative Performance Status Version 2:         %    [email or web address]_performance_scale_[email or web address].pdf  PHYSICAL EXAM:    GENERAL:  NAD   PULMONARY:  Non labored breathing  NEUROLOGIC: Grossly intact  BEHAVIORAL/PSYCH:  Calm.     CRITICAL CARE:  [ ] Shock Present  [ ]Septic [ ]Cardiogenic [ ]Neurologic [ ]Hypovolemic  [ ]  Vasopressors [ ]  Inotropes   [ ]Respiratory failure present [ ]Mechanical ventilation [ ]Non-invasive ventilatory support [ ]High flow  [ ]Acute  [ ]Chronic [ ]Hypoxic  [ ]Hypercarbic [ ]Other  [ ]Other organ failure     LABS: I have reviewed daily labs               RADIOLOGY & ADDITIONAL STUDIES: I have reviewed new imaging    PROTEIN CALORIE MALNUTRITION PRESENT: [ ]mild [ ]moderate [ ]severe [ ]underweight [ ]morbid obesity  https://www.andeal.org/vault/2440/web/files/ONC/Table_Clinical%20Characteristics%20to%20Document%20Malnutrition-White%20JV%20et%20al%435336.pdf    Height (cm): 172.7 (05-24-24 @ 16:20)  Weight (kg): 61.2 (05-24-24 @ 16:20), 68.5 (08-31-23 @ 14:37)  BMI (kg/m2): 20.5 (05-24-24 @ 16:20)    [ ]PPSV2 < or = to 30% [ ]significant weight loss  [ ]poor nutritional intake  [ ]anasarca      [ ]Artificial Nutrition      Palliative Care Spiritual/Emotional Screening Tool Question  Severity (0-4):                    OR                    [ x] Unable to determine. Will assess at later time if appropriate.  Score of 2 or greater indicates recommendation of Chaplaincy and/or SW referral  Chaplaincy Referral: [ ] Yes [ ] Refused [ ] Following     Caregiver [place]:  [ ] Yes [ ] No    OR    [x ] Unable to determine. Will assess at later time if appropriate.  Social Work Referral [ ]  Patient and Family Centered Care Referral [ ]    Anticipatory Grief Present: [ ] Yes [ ] No    OR     [ x] Unable to determine. Will assess at later time if appropriate.  Social Work Referral [ ]  Patient and Family Centered Care Referral [ ]    REFERRALS:   [ ]Chaplaincy  [ ]Hospice  [ ]Child Life  [ ]Social Work  [ ]Case management [ ]Holistic Therapy     Palliative care education provided to patient and/or family

## 2024-05-30 NOTE — PROGRESS NOTE ADULT - SUBJECTIVE AND OBJECTIVE BOX
Gastroenterology progress note:     Patient is a 64y old  Male who presents with a chief complaint of Alcohol intoxication (29 May 2024 16:47)       Admitted on: 05-24-24    We are following the patient for: anemia       Interval History: scheduled for EGD/Colonoscopy but patient drank 3/4th prep , still has brown stool      PAST MEDICAL & SURGICAL HISTORY:  Throat cancer      HTN (hypertension)          MEDICATIONS  (STANDING):  bisacodyl 20 milliGRAM(s) Oral at bedtime  enoxaparin Injectable 40 milliGRAM(s) SubCutaneous every 24 hours  folic acid 1 milliGRAM(s) Oral daily  multivitamin 1 Tablet(s) Oral daily  nicotine - 21 mG/24Hr(s) Patch 1 Patch Transdermal daily  pantoprazole  Injectable 40 milliGRAM(s) IV Push two times a day  polyethylene glycol 3350 17 Gram(s) Oral <User Schedule>  polyethylene glycol/electrolyte Solution. 4000 milliLiter(s) Oral once  senna 2 Tablet(s) Oral at bedtime  thiamine 100 milliGRAM(s) Oral daily    MEDICATIONS  (PRN):  acetaminophen     Tablet .. 650 milliGRAM(s) Oral every 6 hours PRN Temp greater or equal to 38C (100.4F), Mild Pain (1 - 3)  aluminum hydroxide/magnesium hydroxide/simethicone Suspension 30 milliLiter(s) Oral every 4 hours PRN Dyspepsia  benzocaine 20% Spray 1 Spray(s) Topical three times a day PRN per patient request  LORazepam   Injectable 2 milliGRAM(s) IV Push every 2 hours PRN CIWA-Ar score increase by 2 points and a total score of 7 or less  melatonin 3 milliGRAM(s) Oral at bedtime PRN Insomnia  morphine Concentrate 10 milliGRAM(s) Oral every 4 hours PRN Moderate Pain (4 - 6)  ondansetron Injectable 4 milliGRAM(s) IV Push every 8 hours PRN Nausea and/or Vomiting      Allergies  No Known Allergies      Review of Systems:   Cardiovascular:  No Chest Pain, No Palpitations  Respiratory:  No Cough, No Dyspnea  Gastrointestinal:  As described in HPI  Skin:  No Skin Lesions, No Jaundice  Neuro:  No Syncope, No Dizziness    Physical Examination:  T(C): 36.7 (05-30-24 @ 04:59), Max: 36.7 (05-29-24 @ 12:00)  HR: 72 (05-30-24 @ 04:59) (72 - 82)  BP: 125/63 (05-30-24 @ 04:59) (125/63 - 152/75)  RR: 18 (05-30-24 @ 04:59) (18 - 18)  SpO2: 100% (05-30-24 @ 04:59) (100% - 100%)      05-29-24 @ 07:01  -  05-30-24 @ 07:00  --------------------------------------------------------  IN: 458 mL / OUT: 712 mL / NET: -254 mL        GENERAL: AAOx3, no acute distress.  HEAD:  Atraumatic, Normocephalic  EYES: conjunctiva and sclera clear  NECK: Supple, no JVD or thyromegaly  CHEST/LUNG: Clear to auscultation bilaterally; No wheeze, rhonchi, or rales  HEART: Regular rate and rhythm; normal S1, S2, No murmurs.  ABDOMEN: Soft, nontender, nondistended; Bowel sounds present  NEUROLOGY: No asterixis or tremor.   SKIN: Intact, no jaundice     Data:                        7.9    3.77  )-----------( 145      ( 30 May 2024 00:37 )             23.3     Hgb trend:  7.9  05-30-24 @ 00:37  7.7  05-29-24 @ 07:15  8.3  05-28-24 @ 06:04  8.0  05-27-24 @ 21:58      05-27-24 @ 07:01  -  05-28-24 @ 07:00  --------------------------------------------------------  IN: 310 mL      05-30    138  |  100  |  23<H>  ----------------------------<  93  4.6   |  31  |  1.6<H>    Ca    8.9      30 May 2024 00:37  Mg     2.0     05-30    TPro  5.1<L>  /  Alb  3.4<L>  /  TBili  0.2  /  DBili  x   /  AST  76<H>  /  ALT  102<H>  /  AlkPhos  68  05-30    Liver panel trend:  TBili 0.2   /   AST 76   /      /   AlkP 68   /   Tptn 5.1   /   Alb 3.4    /   DBili --      05-30  TBili 0.3   /   AST 72   /   ALT 94   /   AlkP 69   /   Tptn 5.0   /   Alb 3.2    /   DBili --      05-29  TBili 0.4   /      /   ALT 84   /   AlkP 74   /   Tptn 4.9   /   Alb 3.4    /   DBili --      05-28  TBili 0.3   /   AST 60   /   ALT 38   /   AlkP 70   /   Tptn 4.8   /   Alb 3.2    /   DBili --      05-27  TBili 0.5   /   AST 30   /   ALT 18   /   AlkP 82   /   Tptn 5.3   /   Alb 3.6    /   DBili --      05-26  TBili 0.8   /   AST 24   /   ALT 16   /   AlkP 88   /   Tptn 5.7   /   Alb 3.9    /   DBili --      05-25  TBili 0.6   /   AST 23   /   ALT 16   /   AlkP 90   /   Tptn 5.6   /   Alb 4.0    /   DBili --      05-25  TBili 0.6   /   AST 30   /   ALT 21   /   AlkP 115   /   Tptn 7.0   /   Alb 4.6    /   DBili --      05-24      PT/INR - ( 30 May 2024 00:37 )   PT: 9.90 sec;   INR: 0.87 ratio         PTT - ( 30 May 2024 00:37 )  PTT:31.8 sec

## 2024-05-31 VITALS
TEMPERATURE: 98 F | HEART RATE: 74 BPM | DIASTOLIC BLOOD PRESSURE: 70 MMHG | RESPIRATION RATE: 18 BRPM | OXYGEN SATURATION: 98 % | SYSTOLIC BLOOD PRESSURE: 173 MMHG

## 2024-05-31 LAB
ALBUMIN SERPL ELPH-MCNC: 3.3 G/DL — LOW (ref 3.5–5.2)
ALP SERPL-CCNC: 72 U/L — SIGNIFICANT CHANGE UP (ref 30–115)
ALT FLD-CCNC: 88 U/L — HIGH (ref 0–41)
ANION GAP SERPL CALC-SCNC: 7 MMOL/L — SIGNIFICANT CHANGE UP (ref 7–14)
ANISOCYTOSIS BLD QL: SLIGHT — SIGNIFICANT CHANGE UP
APTT BLD: 32.7 SEC — SIGNIFICANT CHANGE UP (ref 27–39.2)
AST SERPL-CCNC: 53 U/L — HIGH (ref 0–41)
BASOPHILS # BLD AUTO: 0 K/UL — SIGNIFICANT CHANGE UP (ref 0–0.2)
BASOPHILS NFR BLD AUTO: 0 % — SIGNIFICANT CHANGE UP (ref 0–1)
BILIRUB SERPL-MCNC: 0.3 MG/DL — SIGNIFICANT CHANGE UP (ref 0.2–1.2)
BUN SERPL-MCNC: 17 MG/DL — SIGNIFICANT CHANGE UP (ref 10–20)
CALCIUM SERPL-MCNC: 9.3 MG/DL — SIGNIFICANT CHANGE UP (ref 8.4–10.5)
CHLORIDE SERPL-SCNC: 100 MMOL/L — SIGNIFICANT CHANGE UP (ref 98–110)
CO2 SERPL-SCNC: 32 MMOL/L — SIGNIFICANT CHANGE UP (ref 17–32)
CREAT SERPL-MCNC: 1.5 MG/DL — SIGNIFICANT CHANGE UP (ref 0.7–1.5)
EGFR: 52 ML/MIN/1.73M2 — LOW
EOSINOPHIL # BLD AUTO: 0.06 K/UL — SIGNIFICANT CHANGE UP (ref 0–0.7)
EOSINOPHIL NFR BLD AUTO: 1.7 % — SIGNIFICANT CHANGE UP (ref 0–8)
GIANT PLATELETS BLD QL SMEAR: PRESENT — SIGNIFICANT CHANGE UP
GLUCOSE SERPL-MCNC: 83 MG/DL — SIGNIFICANT CHANGE UP (ref 70–99)
HCT VFR BLD CALC: 24.5 % — LOW (ref 42–52)
HGB BLD-MCNC: 8.4 G/DL — LOW (ref 14–18)
HYPOCHROMIA BLD QL: SIGNIFICANT CHANGE UP
INR BLD: 0.93 RATIO — SIGNIFICANT CHANGE UP (ref 0.65–1.3)
LYMPHOCYTES # BLD AUTO: 0.32 K/UL — LOW (ref 1.2–3.4)
LYMPHOCYTES # BLD AUTO: 9.6 % — LOW (ref 20.5–51.1)
MAGNESIUM SERPL-MCNC: 1.6 MG/DL — LOW (ref 1.8–2.4)
MANUAL SMEAR VERIFICATION: SIGNIFICANT CHANGE UP
MCHC RBC-ENTMCNC: 34.3 G/DL — SIGNIFICANT CHANGE UP (ref 32–37)
MCHC RBC-ENTMCNC: 36.2 PG — HIGH (ref 27–31)
MCV RBC AUTO: 105.6 FL — HIGH (ref 80–94)
MONOCYTES # BLD AUTO: 0.82 K/UL — HIGH (ref 0.1–0.6)
MONOCYTES NFR BLD AUTO: 24.3 % — HIGH (ref 1.7–9.3)
NEUTROPHILS # BLD AUTO: 2.14 K/UL — SIGNIFICANT CHANGE UP (ref 1.4–6.5)
NEUTROPHILS NFR BLD AUTO: 63.5 % — SIGNIFICANT CHANGE UP (ref 42.2–75.2)
PLAT MORPH BLD: NORMAL — SIGNIFICANT CHANGE UP
PLATELET # BLD AUTO: 178 K/UL — SIGNIFICANT CHANGE UP (ref 130–400)
PMV BLD: 8.8 FL — SIGNIFICANT CHANGE UP (ref 7.4–10.4)
POLYCHROMASIA BLD QL SMEAR: SLIGHT — SIGNIFICANT CHANGE UP
POTASSIUM SERPL-MCNC: 4.7 MMOL/L — SIGNIFICANT CHANGE UP (ref 3.5–5)
POTASSIUM SERPL-SCNC: 4.7 MMOL/L — SIGNIFICANT CHANGE UP (ref 3.5–5)
PROT SERPL-MCNC: 5.1 G/DL — LOW (ref 6–8)
PROTHROM AB SERPL-ACNC: 10.6 SEC — SIGNIFICANT CHANGE UP (ref 9.95–12.87)
RBC # BLD: 2.32 M/UL — LOW (ref 4.7–6.1)
RBC # FLD: 16.5 % — HIGH (ref 11.5–14.5)
RBC BLD AUTO: ABNORMAL
SMUDGE CELLS # BLD: PRESENT — SIGNIFICANT CHANGE UP
SODIUM SERPL-SCNC: 139 MMOL/L — SIGNIFICANT CHANGE UP (ref 135–146)
STOMATOCYTES BLD QL SMEAR: SIGNIFICANT CHANGE UP
VARIANT LYMPHS # BLD: 0.9 % — SIGNIFICANT CHANGE UP (ref 0–5)
WBC # BLD: 3.37 K/UL — LOW (ref 4.8–10.8)
WBC # FLD AUTO: 3.37 K/UL — LOW (ref 4.8–10.8)

## 2024-05-31 PROCEDURE — 99239 HOSP IP/OBS DSCHRG MGMT >30: CPT

## 2024-05-31 PROCEDURE — 45378 DIAGNOSTIC COLONOSCOPY: CPT

## 2024-05-31 PROCEDURE — 43239 EGD BIOPSY SINGLE/MULTIPLE: CPT

## 2024-05-31 PROCEDURE — 88305 TISSUE EXAM BY PATHOLOGIST: CPT | Mod: 26

## 2024-05-31 PROCEDURE — 88312 SPECIAL STAINS GROUP 1: CPT | Mod: 26

## 2024-05-31 RX ORDER — SENNA PLUS 8.6 MG/1
2 TABLET ORAL
Qty: 0 | Refills: 0 | DISCHARGE
Start: 2024-05-31

## 2024-05-31 RX ORDER — PANTOPRAZOLE SODIUM 20 MG/1
1 TABLET, DELAYED RELEASE ORAL
Qty: 0 | Refills: 0 | DISCHARGE
Start: 2024-05-31

## 2024-05-31 RX ORDER — NICOTINE POLACRILEX 2 MG
1 GUM BUCCAL
Qty: 2 | Refills: 0
Start: 2024-05-31 | End: 2024-06-29

## 2024-05-31 RX ORDER — LANOLIN ALCOHOL/MO/W.PET/CERES
1 CREAM (GRAM) TOPICAL
Qty: 0 | Refills: 0 | DISCHARGE
Start: 2024-05-31

## 2024-05-31 RX ORDER — CHLORHEXIDINE GLUCONATE 213 G/1000ML
1 SOLUTION TOPICAL DAILY
Refills: 0 | Status: DISCONTINUED | OUTPATIENT
Start: 2024-05-31 | End: 2024-05-31

## 2024-05-31 RX ORDER — POLYETHYLENE GLYCOL 3350 17 G/17G
17 POWDER, FOR SOLUTION ORAL
Qty: 0 | Refills: 0 | DISCHARGE
Start: 2024-05-31

## 2024-05-31 RX ORDER — BENZOCAINE 10 %
1 GEL (GRAM) MUCOUS MEMBRANE
Qty: 2 | Refills: 0
Start: 2024-05-31 | End: 2024-06-04

## 2024-05-31 RX ORDER — SENNA PLUS 8.6 MG/1
2 TABLET ORAL
Qty: 60 | Refills: 0
Start: 2024-05-31 | End: 2024-06-29

## 2024-05-31 RX ORDER — ACETAMINOPHEN 500 MG
2 TABLET ORAL
Qty: 0 | Refills: 0 | DISCHARGE
Start: 2024-05-31

## 2024-05-31 RX ORDER — FOLIC ACID 0.8 MG
1 TABLET ORAL
Qty: 30 | Refills: 0
Start: 2024-05-31 | End: 2024-06-29

## 2024-05-31 RX ORDER — THIAMINE MONONITRATE (VIT B1) 100 MG
1 TABLET ORAL
Qty: 0 | Refills: 0 | DISCHARGE
Start: 2024-05-31

## 2024-05-31 RX ORDER — LANOLIN ALCOHOL/MO/W.PET/CERES
1 CREAM (GRAM) TOPICAL
Qty: 30 | Refills: 0
Start: 2024-05-31 | End: 2024-06-29

## 2024-05-31 RX ORDER — THIAMINE MONONITRATE (VIT B1) 100 MG
1 TABLET ORAL
Qty: 30 | Refills: 0
Start: 2024-05-31 | End: 2024-06-29

## 2024-05-31 RX ORDER — BENZOCAINE 10 %
1 GEL (GRAM) MUCOUS MEMBRANE
Qty: 0 | Refills: 0 | DISCHARGE
Start: 2024-05-31

## 2024-05-31 RX ORDER — PANTOPRAZOLE SODIUM 20 MG/1
40 TABLET, DELAYED RELEASE ORAL
Refills: 0 | Status: DISCONTINUED | OUTPATIENT
Start: 2024-05-31 | End: 2024-05-31

## 2024-05-31 RX ORDER — POLYETHYLENE GLYCOL 3350 17 G/17G
17 POWDER, FOR SOLUTION ORAL
Qty: 2 | Refills: 0
Start: 2024-05-31 | End: 2024-06-29

## 2024-05-31 RX ORDER — NICOTINE POLACRILEX 2 MG
1 GUM BUCCAL
Qty: 0 | Refills: 0 | DISCHARGE
Start: 2024-05-31

## 2024-05-31 RX ORDER — HYDROXYZINE HCL 10 MG
1 TABLET ORAL
Qty: 0 | Refills: 0 | DISCHARGE
Start: 2024-05-31

## 2024-05-31 RX ORDER — PANTOPRAZOLE SODIUM 20 MG/1
1 TABLET, DELAYED RELEASE ORAL
Qty: 30 | Refills: 0
Start: 2024-05-31 | End: 2024-06-29

## 2024-05-31 RX ORDER — HYDROXYZINE HCL 10 MG
1 TABLET ORAL
Qty: 10 | Refills: 0
Start: 2024-05-31 | End: 2024-06-04

## 2024-05-31 RX ORDER — MORPHINE SULFATE 50 MG/1
0.5 CAPSULE, EXTENDED RELEASE ORAL
Qty: 9 | Refills: 0
Start: 2024-05-31 | End: 2024-06-02

## 2024-05-31 RX ORDER — HYDROXYZINE HCL 10 MG
25 TABLET ORAL
Refills: 0 | Status: DISCONTINUED | OUTPATIENT
Start: 2024-05-31 | End: 2024-05-31

## 2024-05-31 RX ORDER — MORPHINE SULFATE 50 MG/1
0.5 CAPSULE, EXTENDED RELEASE ORAL
Qty: 0 | Refills: 0 | DISCHARGE
Start: 2024-05-31

## 2024-05-31 RX ORDER — OMEPRAZOLE 10 MG/1
1 CAPSULE, DELAYED RELEASE ORAL
Refills: 0 | DISCHARGE

## 2024-05-31 RX ORDER — MAGNESIUM SULFATE 500 MG/ML
2 VIAL (ML) INJECTION ONCE
Refills: 0 | Status: COMPLETED | OUTPATIENT
Start: 2024-05-31 | End: 2024-05-31

## 2024-05-31 RX ORDER — FOLIC ACID 0.8 MG
1 TABLET ORAL
Qty: 0 | Refills: 0 | DISCHARGE
Start: 2024-05-31

## 2024-05-31 RX ADMIN — Medication 2 MILLIGRAM(S): at 04:18

## 2024-05-31 RX ADMIN — Medication 1 PATCH: at 11:46

## 2024-05-31 RX ADMIN — Medication 1 MILLIGRAM(S): at 11:46

## 2024-05-31 RX ADMIN — MORPHINE SULFATE 10 MILLIGRAM(S): 50 CAPSULE, EXTENDED RELEASE ORAL at 11:45

## 2024-05-31 RX ADMIN — MORPHINE SULFATE 10 MILLIGRAM(S): 50 CAPSULE, EXTENDED RELEASE ORAL at 03:16

## 2024-05-31 RX ADMIN — Medication 1 TABLET(S): at 11:46

## 2024-05-31 RX ADMIN — Medication 1 PATCH: at 12:00

## 2024-05-31 RX ADMIN — MORPHINE SULFATE 10 MILLIGRAM(S): 50 CAPSULE, EXTENDED RELEASE ORAL at 04:35

## 2024-05-31 RX ADMIN — CHLORHEXIDINE GLUCONATE 1 APPLICATION(S): 213 SOLUTION TOPICAL at 11:49

## 2024-05-31 RX ADMIN — Medication 1 PATCH: at 07:24

## 2024-05-31 RX ADMIN — Medication 100 MILLIGRAM(S): at 11:46

## 2024-05-31 RX ADMIN — Medication 0.5 MILLIGRAM(S): at 11:46

## 2024-05-31 RX ADMIN — Medication 25 GRAM(S): at 03:16

## 2024-05-31 NOTE — PROGRESS NOTE ADULT - REASON FOR ADMISSION
Alcohol intoxication

## 2024-05-31 NOTE — DISCHARGE NOTE PROVIDER - ATTENDING DISCHARGE PHYSICAL EXAMINATION:
GEN:NAD, pleasant   LUNGS: CTAB no WRR  HEART: s1 s2, no MRG   ABD: nontender nondistended   EXT: no lower extremity edema   NEURO: AAO3

## 2024-05-31 NOTE — DISCHARGE NOTE PROVIDER - NSDCMRMEDTOKEN_GEN_ALL_CORE_FT
acetaminophen 325 mg oral tablet: 2 tab(s) orally every 6 hours As needed Temp greater or equal to 38C (100.4F), Mild Pain (1 - 3)  aluminum hydroxide-magnesium hydroxide 200 mg-200 mg/5 mL oral suspension: 30 milliliter(s) orally every 4 hours As needed Dyspepsia  benzocaine 20% topical spray: 1 Apply topically to affected area 3 times a day As needed per patient request  folic acid 1 mg oral tablet: 1 tab(s) orally once a day  hydrOXYzine hydrochloride 25 mg oral tablet: 1 tab(s) orally 2 times a day As needed Anxiety  melatonin 3 mg oral tablet: 1 tab(s) orally once a day (at bedtime) As needed Insomnia  morphine 20 mg/mL oral concentrate: 0.5 milliliter(s) orally every 4 hours As needed Moderate Pain (4 - 6)  Multiple Vitamins oral tablet: 1 tab(s) orally once a day  nicotine 21 mg/24 hr transdermal film, extended release: 1 patch transdermal once a day  pantoprazole 40 mg oral delayed release tablet: 1 tab(s) orally once a day (before a meal)  polyethylene glycol 3350 oral powder for reconstitution: 17 gram(s) orally 2 times a day  senna leaf extract oral tablet: 2 tab(s) orally once a day (at bedtime)  thiamine 100 mg oral tablet: 1 tab(s) orally once a day   acetaminophen 325 mg oral tablet: 2 tab(s) orally every 6 hours As needed Temp greater or equal to 38C (100.4F), Mild Pain (1 - 3)  aluminum hydroxide-magnesium hydroxide 200 mg-200 mg/5 mL oral suspension: 30 milliliter(s) orally every 4 hours As needed Dyspepsia  benzocaine 20% topical spray: 1 Apply topically to affected area 3 times a day As needed per patient request  folic acid 1 mg oral tablet: 1 tab(s) orally once a day  hydrOXYzine hydrochloride 25 mg oral tablet: 1 tab(s) orally 2 times a day As needed Anxiety  melatonin 3 mg oral tablet: 1 tab(s) orally once a day (at bedtime) As needed Insomnia  morphine 20 mg/mL oral concentrate: 0.5 milliliter(s) orally every 4 hours as needed for Moderate Pain (4 - 6) MDD: 3mL  Multiple Vitamins oral tablet: 1 tab(s) orally once a day  nicotine 21 mg/24 hr transdermal film, extended release: 1 patch transdermal once a day  pantoprazole 40 mg oral delayed release tablet: 1 tab(s) orally once a day (before a meal)  polyethylene glycol 3350 oral powder for reconstitution: 17 gram(s) orally 2 times a day  senna leaf extract oral tablet: 2 tab(s) orally once a day (at bedtime)  thiamine 100 mg oral tablet: 1 tab(s) orally once a day   acetaminophen 325 mg oral tablet: 2 tab(s) orally every 6 hours As needed Temp greater or equal to 38C (100.4F), Mild Pain (1 - 3)  aluminum hydroxide-magnesium hydroxide 200 mg-200 mg/5 mL oral suspension: 30 milliliter(s) orally every 4 hours as needed for Dyspepsia  folic acid 1 mg oral tablet: 1 tab(s) orally once a day  Hurricaine 20% mucous membrane spray: Apply topically to affected area 10 times a day  hydrOXYzine hydrochloride 25 mg oral tablet: 1 tab(s) orally 2 times a day as needed for Anxiety  hydrOXYzine hydrochloride 25 mg oral tablet: 1 tab(s) orally 2 times a day As needed Anxiety  melatonin 3 mg oral tablet: 1 tab(s) orally once a day (at bedtime) as needed for Insomnia  morphine 20 mg/mL oral concentrate: 0.5 milliliter(s) orally every 4 hours as needed for Moderate Pain (4 - 6) MDD: 3mL  Multiple Vitamins oral tablet: 1 tab(s) orally once a day  nicotine 21 mg/24 hr transdermal film, extended release: 1 patch transdermal once a day  pantoprazole 40 mg oral delayed release tablet: 1 tab(s) orally once a day (before a meal)  polyethylene glycol 3350 oral powder for reconstitution: 17 gram(s) orally 2 times a day  senna leaf extract oral tablet: 2 tab(s) orally once a day (at bedtime)  thiamine 100 mg oral tablet: 1 tab(s) orally once a day

## 2024-05-31 NOTE — DISCHARGE NOTE PROVIDER - HOSPITAL COURSE
64-year-old male with history of alcohol use disorder, throat cancer in remission, COPD not on home O2, smoker ( 80 pack years) presents with acute alcohol intoxication. Pt wanted to go to rehab today but was told to come to ED instead. He admits his last drink was 4 hours prior to ED arrival. He endorses feeling nauseas.  Patient admits he has not had anything to eat for the last 10 days.  Patient denies any trauma falls numbness tingling weakness vomiting. Denies history of alcohol withdrawal seizure.     #melena  #anemia  #H/O throat Ca in remission  - CT neck: No acute pathology in the neck.  Posttreatment related changes noted along the upper aerodigestive tract   without evidence for nodular or masslike enhancement to suggest neoplasm.  - s/p 1 prbc 5/27  - protonix daily   - ENT consulted. Video FFL- post-radiation changed & edema  - egd/colonoscopy 5/31:  EGD Impressions:  - Normal mucosa in the whole esophagus.  - Small food residues (likely remnants of vegetables) were noted in the stomach. The residues were irrigated and partially suctioned. .  - Erythema in the stomach compatible with non-erosive gastritis. (Biopsy).  - Normal mucosa in the whole examined duodenum. (Biopsy).  Colonoscopy Impressions:  - Solid stools were noted throughout the colon, especially at the cecum and ascending colon. 3 small non-bleeding AVMs were noted in the cecum with overlying solid pieces of stools. Despite aggressive irrigation and suctioning, visualization remained limited so APC was not performed to avoid complications. In the setting of poor preparation, small and flat lesions, masses, polyps, or other AVMs could have been missed.   - Mild diverticulosis of the left side of the colon.  - Grade/Stage I internal hemorrhoids.  Plan:	  - Await pathology  - Switch protonix 40mg BID to 40mg QD 30 minutes before breakfast  - In the absence of bleeding and in setting of stable Hb, would advance diet as tolerated  - In the setting of poor preparation, would recommend repeating colonoscopy in 3 months (or earlier if otherwise indicated in case of bleeding or drop in Hb in the setting of AVMs being a possible source of anemia).    #alcohol withdrawal   #alcohol use disorder   #starvation ketosis   - beta hydroxy butyrate neg   - Cont MVI, Thiamine and Folic acid.     #Macrocytic anemia  #Suspected thiamine deficiency   #Folic acid deficiency    - likely 2/2 chronic alcohol use   - s/p IV thiamine 500 mg   - c/w thiamine  mg daily   - c/w Folic acid supplement  - c/w daily vitamins   - Vitamin B12 wnl  - b9 low at 3.4    #SERVANDO   #Hyponatremia - resolved   - monitor cr     #transaminitis  - perform ruq us outpatient   - f/u hepatitis panel     #COPD not on home o2  #Not in exacerbation  #Active smoker ( 80 pack year smoking hx)  - Not on any inhalers at home   - albuterol PRN 64-year-old male with history of alcohol use disorder, throat cancer in remission, COPD not on home O2, smoker ( 80 pack years) presents with acute alcohol intoxication. Pt wanted to go to rehab today but was told to come to ED instead. He admits his last drink was 4 hours prior to ED arrival. He endorses feeling nauseas.  Patient admits he has not had anything to eat for the last 10 days.  Patient denies any trauma falls numbness tingling weakness vomiting. Denies history of alcohol withdrawal seizure.     #melena  #anemia  #H/O throat Ca in remission  - CT neck: No acute pathology in the neck.  Posttreatment related changes noted along the upper aerodigestive tract   without evidence for nodular or masslike enhancement to suggest neoplasm.  - s/p 1 prbc 5/27  - protonix daily   - ENT consulted. Video FFL- post-radiation changed & edema  - egd/colonoscopy 5/31:  EGD Impressions:  - Normal mucosa in the whole esophagus.  - Small food residues (likely remnants of vegetables) were noted in the stomach. The residues were irrigated and partially suctioned. .  - Erythema in the stomach compatible with non-erosive gastritis. (Biopsy).  - Normal mucosa in the whole examined duodenum. (Biopsy).  Colonoscopy Impressions:  - Solid stools were noted throughout the colon, especially at the cecum and ascending colon. 3 small non-bleeding AVMs were noted in the cecum with overlying solid pieces of stools. Despite aggressive irrigation and suctioning, visualization remained limited so APC was not performed to avoid complications. In the setting of poor preparation, small and flat lesions, masses, polyps, or other AVMs could have been missed.   - Mild diverticulosis of the left side of the colon.  - Grade/Stage I internal hemorrhoids.  Plan:	  - Will need to f/u pathology as OP  - Switch protonix 40mg BID to 40mg QD 30 minutes before breakfast  - In the setting of poor preparation, would recommend repeating colonoscopy in 3 months (or earlier if otherwise indicated in case of bleeding or drop in Hb in the setting of AVMs being a possible source of anemia).    #alcohol withdrawal   #alcohol use disorder   #starvation ketosis   - beta hydroxy butyrate neg   - Cont MVI, Thiamine and Folic acid.     #Macrocytic anemia  #Suspected thiamine deficiency   #Folic acid deficiency    - likely 2/2 chronic alcohol use   - s/p IV thiamine 500 mg   - c/w thiamine  mg daily   - c/w Folic acid supplement  - c/w daily vitamins   - Vitamin B12 wnl  - b9 low at 3.4    #SERVANDO   #Hyponatremia - resolved   - monitor cr - improved     #transaminitis  - perform ruq us outpatient   - f/u hepatitis panel     #COPD not on home o2  #Not in exacerbation  #Active smoker ( 80 pack year smoking hx)  - Not on any inhalers at home   - albuterol PRN

## 2024-05-31 NOTE — DISCHARGE NOTE NURSING/CASE MANAGEMENT/SOCIAL WORK - NSDCPEFALRISK_GEN_ALL_CORE
For information on Fall & Injury Prevention, visit: [email or web address] OR  [email or web address] OR  https://www.cdc.gov/steadi/patient.html

## 2024-05-31 NOTE — PROGRESS NOTE ADULT - ASSESSMENT
pt still smokes and drinls a lot   came in for alcohal intoxication   h/o throat cancer  in remission   all lab work reviewed   hb low with high MCV   ANEMIA multifactorial    poor nutrion,folate,iron and thiamine def   need to  be in a facility for rehab  throat cancer history  high risk for recurrence    cnt all present symptomatic treatment   will follow with you   EGD DONE TODAY result to follow   possible discharge today    karl blackman MD

## 2024-05-31 NOTE — PROGRESS NOTE ADULT - PROVIDER SPECIALTY LIST ADULT
Hospitalist
ENT
Gastroenterology
Internal Medicine
Internal Medicine
Gastroenterology
Heme/Onc
Hospitalist
Palliative Care
ENT
Gastroenterology
Gastroenterology
Hospitalist
Hospitalist
Internal Medicine
Internal Medicine
Palliative Care

## 2024-05-31 NOTE — DISCHARGE NOTE PROVIDER - CARE PROVIDER_API CALL
Anjelica Tyler  Gastroenterology  [address] [place] [address]  Phone: [phone]  Fax: [phone]  Follow Up Time: 1 month    Stacy Walls  Internal Medicine  [address] [place] [address]  Phone: [phone]  Fax: [phone]  Follow Up Time: 1 week    Dr. Lu,   Please follow up with your outpatient pain doctor. Dr. Lu  Phone: (   )    -  Fax: (   )    -  Follow Up Time:     Denver Collado  Head/Neck Surgery  [address] [place] [address]  Phone: [phone]  Fax: [phone]  Follow Up Time: 1 week

## 2024-05-31 NOTE — DISCHARGE NOTE NURSING/CASE MANAGEMENT/SOCIAL WORK - NSSBIRTALCACTIVEREFTXDET_GEN_A_CORE
Patient provided with a referral to substance use treatment at [facility], [address] , [place] [address], [phone], [phone]

## 2024-05-31 NOTE — DISCHARGE NOTE PROVIDER - PROVIDER TOKENS
PROVIDER:[TOKEN:[[id number]],FOLLOWUP:[1 month]],PROVIDER:[TOKEN:[[id number]],FOLLOWUP:[1 week]],FREE:[LAST:[Dr. Lu],PHONE:[(   )    -],FAX:[(   )    -],ADDRESS:[Please follow up with your outpatient pain doctor. Dr. Lu]],PROVIDER:[TOKEN:[[id number]],FOLLOWUP:[1 week]]

## 2024-05-31 NOTE — DISCHARGE NOTE NURSING/CASE MANAGEMENT/SOCIAL WORK - PATIENT PORTAL LINK FT
You can access the FollowMyHealth Patient Portal offered by [facility] by registering at the following website: [email or web address]. By joining [employer]’s FollowMyHealth portal, you will also be able to view your health information using other applications (apps) compatible with our system.

## 2024-05-31 NOTE — CHART NOTE - NSCHARTNOTEFT_GEN_A_CORE
EGD Impressions:  - Normal mucosa in the whole esophagus.  - Small food residues (likely remnants of vegetables) were noted in the stomach. The residues were irrigated and partially suctioned. .  - Erythema in the stomach compatible with non-erosive gastritis. (Biopsy).  - Normal mucosa in the whole examined duodenum. (Biopsy).    Colonoscopy Impressions:  - Solid stools were noted throughout the colon, especially at the cecum and ascending colon. 3 small non-bleeding AVMs were noted in the cecum with overlying solid pieces of stools. Despite aggressive irrigation and suctioning, visualization remained limited so APC was not performed to avoid complications. In the setting of poor preparation, small and flat lesions, masses, polyps, or other AVMs could have been missed.   - Mild diverticulosis of the the left side of the colon.  - Grade/Stage I internal hemorrhoids.    Plan:	  - Await pathology  - Switch protonix 40mg BID to 40mg QD 30 minutes before breakfast  - In the absence of bleeding and in setting of stable Hb, would advance diet as tolerated  - In the setting of poor preparation, would recommend repeating colonoscopy in 3 months (or earlier if otherwise indicated in case of bleeding or drop in Hb in the setting of AVMs being a possible source of anemia?) EGD Impressions:  - Normal mucosa in the whole esophagus.  - Small food residues (likely remnants of vegetables) were noted in the stomach. The residues were irrigated and partially suctioned. .  - Erythema in the stomach compatible with non-erosive gastritis. (Biopsy).  - Normal mucosa in the whole examined duodenum. (Biopsy).    Colonoscopy Impressions:  - Solid stools were noted throughout the colon, especially at the cecum and ascending colon. 3 small non-bleeding AVMs were noted in the cecum with overlying solid pieces of stools. Despite aggressive irrigation and suctioning, visualization remained limited so APC was not performed to avoid complications. In the setting of poor preparation, small and flat lesions, masses, polyps, or other AVMs could have been missed.   - Mild diverticulosis of the left side of the colon.  - Grade/Stage I internal hemorrhoids.    Plan:	  - Await pathology  - Switch protonix 40mg BID to 40mg QD 30 minutes before breakfast  - In the absence of bleeding and in setting of stable Hb, would advance diet as tolerated  - In the setting of poor preparation, would recommend repeating colonoscopy in 3 months (or earlier if otherwise indicated in case of bleeding or drop in Hb in the setting of AVMs being a possible source of anemia)

## 2024-05-31 NOTE — DISCHARGE NOTE PROVIDER - CARE PROVIDERS DIRECT ADDRESSES
,[email or web address]

## 2024-06-01 LAB
HAV IGM SER-ACNC: SIGNIFICANT CHANGE UP
HBV CORE IGM SER-ACNC: SIGNIFICANT CHANGE UP
HBV SURFACE AG SER-ACNC: SIGNIFICANT CHANGE UP
HCV AB S/CO SERPL IA: 0.06 S/CO — SIGNIFICANT CHANGE UP (ref 0–0.99)
HCV AB SERPL-IMP: SIGNIFICANT CHANGE UP

## 2024-06-02 ENCOUNTER — INPATIENT (INPATIENT)
Facility: HOSPITAL | Age: 64
LOS: 5 days | Discharge: HOME HEALTH | DRG: 871 | End: 2024-06-08
Attending: INTERNAL MEDICINE | Admitting: INTERNAL MEDICINE
Payer: COMMERCIAL

## 2024-06-02 VITALS
SYSTOLIC BLOOD PRESSURE: 88 MMHG | DIASTOLIC BLOOD PRESSURE: 53 MMHG | HEIGHT: 68 IN | HEART RATE: 88 BPM | OXYGEN SATURATION: 96 % | TEMPERATURE: 98 F | RESPIRATION RATE: 19 BRPM

## 2024-06-02 PROCEDURE — 99285 EMERGENCY DEPT VISIT HI MDM: CPT

## 2024-06-02 NOTE — ED ADULT TRIAGE NOTE - CHIEF COMPLAINT QUOTE
BIBEMS from home. pt had syncopal episode. on scene RA:84% BP:75/50. FS:143. family caught the pt, lowered to the floor. denies trauma as per ems given 300ml fluid bolus

## 2024-06-03 DIAGNOSIS — F10.239 ALCOHOL DEPENDENCE WITH WITHDRAWAL, UNSPECIFIED: ICD-10-CM

## 2024-06-03 LAB
ALBUMIN SERPL ELPH-MCNC: 3.1 G/DL — LOW (ref 3.5–5.2)
ALBUMIN SERPL ELPH-MCNC: 4.1 G/DL — SIGNIFICANT CHANGE UP (ref 3.5–5.2)
ALP SERPL-CCNC: 51 U/L — SIGNIFICANT CHANGE UP (ref 30–115)
ALP SERPL-CCNC: 81 U/L — SIGNIFICANT CHANGE UP (ref 30–115)
ALT FLD-CCNC: 47 U/L — HIGH (ref 0–41)
ALT FLD-CCNC: 60 U/L — HIGH (ref 0–41)
ANION GAP SERPL CALC-SCNC: 28 MMOL/L — HIGH (ref 7–14)
ANION GAP SERPL CALC-SCNC: 9 MMOL/L — SIGNIFICANT CHANGE UP (ref 7–14)
APTT BLD: 34.7 SEC — SIGNIFICANT CHANGE UP (ref 27–39.2)
AST SERPL-CCNC: 43 U/L — HIGH (ref 0–41)
AST SERPL-CCNC: 66 U/L — HIGH (ref 0–41)
B-OH-BUTYR SERPL-SCNC: 0.2 MMOL/L — SIGNIFICANT CHANGE UP
BASE EXCESS BLDV CALC-SCNC: -2.9 MMOL/L — LOW (ref -2–3)
BASOPHILS # BLD AUTO: 0.02 K/UL — SIGNIFICANT CHANGE UP (ref 0–0.2)
BASOPHILS # BLD AUTO: 0.05 K/UL — SIGNIFICANT CHANGE UP (ref 0–0.2)
BASOPHILS NFR BLD AUTO: 0.2 % — SIGNIFICANT CHANGE UP (ref 0–1)
BASOPHILS NFR BLD AUTO: 0.3 % — SIGNIFICANT CHANGE UP (ref 0–1)
BILIRUB SERPL-MCNC: 0.2 MG/DL — SIGNIFICANT CHANGE UP (ref 0.2–1.2)
BILIRUB SERPL-MCNC: 0.4 MG/DL — SIGNIFICANT CHANGE UP (ref 0.2–1.2)
BUN SERPL-MCNC: 34 MG/DL — HIGH (ref 10–20)
BUN SERPL-MCNC: 39 MG/DL — HIGH (ref 10–20)
CA-I SERPL-SCNC: 1.26 MMOL/L — SIGNIFICANT CHANGE UP (ref 1.15–1.33)
CALCIUM SERPL-MCNC: 8.4 MG/DL — SIGNIFICANT CHANGE UP (ref 8.4–10.5)
CALCIUM SERPL-MCNC: 9.8 MG/DL — SIGNIFICANT CHANGE UP (ref 8.4–10.5)
CHLORIDE SERPL-SCNC: 94 MMOL/L — LOW (ref 98–110)
CHLORIDE SERPL-SCNC: 96 MMOL/L — LOW (ref 98–110)
CK SERPL-CCNC: 1847 U/L — HIGH (ref 0–225)
CO2 SERPL-SCNC: 17 MMOL/L — SIGNIFICANT CHANGE UP (ref 17–32)
CO2 SERPL-SCNC: 25 MMOL/L — SIGNIFICANT CHANGE UP (ref 17–32)
CREAT SERPL-MCNC: 2.3 MG/DL — HIGH (ref 0.7–1.5)
CREAT SERPL-MCNC: 2.7 MG/DL — HIGH (ref 0.7–1.5)
D DIMER BLD IA.RAPID-MCNC: 758 NG/ML DDU — HIGH
EGFR: 26 ML/MIN/1.73M2 — LOW
EGFR: 31 ML/MIN/1.73M2 — LOW
EOSINOPHIL # BLD AUTO: 0 K/UL — SIGNIFICANT CHANGE UP (ref 0–0.7)
EOSINOPHIL # BLD AUTO: 0 K/UL — SIGNIFICANT CHANGE UP (ref 0–0.7)
EOSINOPHIL NFR BLD AUTO: 0 % — SIGNIFICANT CHANGE UP (ref 0–8)
EOSINOPHIL NFR BLD AUTO: 0 % — SIGNIFICANT CHANGE UP (ref 0–8)
FLUAV AG NPH QL: SIGNIFICANT CHANGE UP
FLUBV AG NPH QL: SIGNIFICANT CHANGE UP
GAS PNL BLDV: 131 MMOL/L — LOW (ref 136–145)
GAS PNL BLDV: SIGNIFICANT CHANGE UP
GLUCOSE SERPL-MCNC: 87 MG/DL — SIGNIFICANT CHANGE UP (ref 70–99)
GLUCOSE SERPL-MCNC: 96 MG/DL — SIGNIFICANT CHANGE UP (ref 70–99)
HCO3 BLDV-SCNC: 25 MMOL/L — SIGNIFICANT CHANGE UP (ref 22–29)
HCT VFR BLD CALC: 18.8 % — LOW (ref 42–52)
HCT VFR BLD CALC: 20.1 % — LOW (ref 42–52)
HCT VFR BLD CALC: 30.1 % — LOW (ref 42–52)
HCT VFR BLDA CALC: 25 % — LOW (ref 39–51)
HGB BLD CALC-MCNC: 8.3 G/DL — LOW (ref 12.6–17.4)
HGB BLD-MCNC: 6.4 G/DL — CRITICAL LOW (ref 14–18)
HGB BLD-MCNC: 7 G/DL — LOW (ref 14–18)
HGB BLD-MCNC: 9.9 G/DL — LOW (ref 14–18)
IMM GRANULOCYTES NFR BLD AUTO: 1.1 % — HIGH (ref 0.1–0.3)
IMM GRANULOCYTES NFR BLD AUTO: 1.4 % — HIGH (ref 0.1–0.3)
INR BLD: 1 RATIO — SIGNIFICANT CHANGE UP (ref 0.65–1.3)
LACTATE BLDV-MCNC: 5.2 MMOL/L — CRITICAL HIGH (ref 0.5–2)
LACTATE SERPL-SCNC: 1.4 MMOL/L — SIGNIFICANT CHANGE UP (ref 0.7–2)
LACTATE SERPL-SCNC: 14.2 MMOL/L — CRITICAL HIGH (ref 0.7–2)
LYMPHOCYTES # BLD AUTO: 0.21 K/UL — LOW (ref 1.2–3.4)
LYMPHOCYTES # BLD AUTO: 0.42 K/UL — LOW (ref 1.2–3.4)
LYMPHOCYTES # BLD AUTO: 1.8 % — LOW (ref 20.5–51.1)
LYMPHOCYTES # BLD AUTO: 2.4 % — LOW (ref 20.5–51.1)
MAGNESIUM SERPL-MCNC: 1.6 MG/DL — LOW (ref 1.8–2.4)
MAGNESIUM SERPL-MCNC: 2.1 MG/DL — SIGNIFICANT CHANGE UP (ref 1.8–2.4)
MCHC RBC-ENTMCNC: 32.9 G/DL — SIGNIFICANT CHANGE UP (ref 32–37)
MCHC RBC-ENTMCNC: 34 G/DL — SIGNIFICANT CHANGE UP (ref 32–37)
MCHC RBC-ENTMCNC: 34.8 G/DL — SIGNIFICANT CHANGE UP (ref 32–37)
MCHC RBC-ENTMCNC: 35.6 PG — HIGH (ref 27–31)
MCHC RBC-ENTMCNC: 35.8 PG — HIGH (ref 27–31)
MCHC RBC-ENTMCNC: 36.1 PG — HIGH (ref 27–31)
MCV RBC AUTO: 103.6 FL — HIGH (ref 80–94)
MCV RBC AUTO: 105 FL — HIGH (ref 80–94)
MCV RBC AUTO: 108.3 FL — HIGH (ref 80–94)
MONOCYTES # BLD AUTO: 1.48 K/UL — HIGH (ref 0.1–0.6)
MONOCYTES # BLD AUTO: 2.63 K/UL — HIGH (ref 0.1–0.6)
MONOCYTES NFR BLD AUTO: 12.5 % — HIGH (ref 1.7–9.3)
MONOCYTES NFR BLD AUTO: 14.8 % — HIGH (ref 1.7–9.3)
NEUTROPHILS # BLD AUTO: 10.01 K/UL — HIGH (ref 1.4–6.5)
NEUTROPHILS # BLD AUTO: 14.42 K/UL — HIGH (ref 1.4–6.5)
NEUTROPHILS NFR BLD AUTO: 81.1 % — HIGH (ref 42.2–75.2)
NEUTROPHILS NFR BLD AUTO: 84.4 % — HIGH (ref 42.2–75.2)
NRBC # BLD: 0 /100 WBCS — SIGNIFICANT CHANGE UP (ref 0–0)
OSMOLALITY SERPL: 304 MOS/KG — HIGH (ref 280–301)
PCO2 BLDV: 63 MMHG — HIGH (ref 42–55)
PH BLDV: 7.21 — LOW (ref 7.32–7.43)
PHOSPHATE SERPL-MCNC: 5.1 MG/DL — HIGH (ref 2.1–4.9)
PHOSPHATE SERPL-MCNC: 8.3 MG/DL — HIGH (ref 2.1–4.9)
PLATELET # BLD AUTO: 223 K/UL — SIGNIFICANT CHANGE UP (ref 130–400)
PLATELET # BLD AUTO: 228 K/UL — SIGNIFICANT CHANGE UP (ref 130–400)
PLATELET # BLD AUTO: 395 K/UL — SIGNIFICANT CHANGE UP (ref 130–400)
PMV BLD: 9.5 FL — SIGNIFICANT CHANGE UP (ref 7.4–10.4)
PMV BLD: 9.6 FL — SIGNIFICANT CHANGE UP (ref 7.4–10.4)
PMV BLD: 9.8 FL — SIGNIFICANT CHANGE UP (ref 7.4–10.4)
PO2 BLDV: 25 MMHG — SIGNIFICANT CHANGE UP (ref 25–45)
POTASSIUM BLDV-SCNC: 5.4 MMOL/L — HIGH (ref 3.5–5.1)
POTASSIUM SERPL-MCNC: 5.6 MMOL/L — HIGH (ref 3.5–5)
POTASSIUM SERPL-MCNC: 6.5 MMOL/L — CRITICAL HIGH (ref 3.5–5)
POTASSIUM SERPL-SCNC: 5.6 MMOL/L — HIGH (ref 3.5–5)
POTASSIUM SERPL-SCNC: 6.5 MMOL/L — CRITICAL HIGH (ref 3.5–5)
PROT SERPL-MCNC: 4.6 G/DL — LOW (ref 6–8)
PROT SERPL-MCNC: 6.5 G/DL — SIGNIFICANT CHANGE UP (ref 6–8)
PROTHROM AB SERPL-ACNC: 11.4 SEC — SIGNIFICANT CHANGE UP (ref 9.95–12.87)
RBC # BLD: 1.79 M/UL — LOW (ref 4.7–6.1)
RBC # BLD: 1.94 M/UL — LOW (ref 4.7–6.1)
RBC # BLD: 2.78 M/UL — LOW (ref 4.7–6.1)
RBC # FLD: 15.6 % — HIGH (ref 11.5–14.5)
RBC # FLD: 15.9 % — HIGH (ref 11.5–14.5)
RBC # FLD: 16 % — HIGH (ref 11.5–14.5)
RSV RNA NPH QL NAA+NON-PROBE: SIGNIFICANT CHANGE UP
SAO2 % BLDV: 28.6 % — LOW (ref 67–88)
SARS-COV-2 RNA SPEC QL NAA+PROBE: SIGNIFICANT CHANGE UP
SODIUM SERPL-SCNC: 130 MMOL/L — LOW (ref 135–146)
SODIUM SERPL-SCNC: 139 MMOL/L — SIGNIFICANT CHANGE UP (ref 135–146)
SURGICAL PATHOLOGY STUDY: SIGNIFICANT CHANGE UP
TROPONIN T, HIGH SENSITIVITY RESULT: 62 NG/L — CRITICAL HIGH (ref 6–21)
TROPONIN T, HIGH SENSITIVITY RESULT: 79 NG/L — CRITICAL HIGH (ref 6–21)
TROPONIN T, HIGH SENSITIVITY RESULT: 88 NG/L — CRITICAL HIGH (ref 6–21)
WBC # BLD: 11.85 K/UL — HIGH (ref 4.8–10.8)
WBC # BLD: 15.24 K/UL — HIGH (ref 4.8–10.8)
WBC # BLD: 17.76 K/UL — HIGH (ref 4.8–10.8)
WBC # FLD AUTO: 11.85 K/UL — HIGH (ref 4.8–10.8)
WBC # FLD AUTO: 15.24 K/UL — HIGH (ref 4.8–10.8)
WBC # FLD AUTO: 17.76 K/UL — HIGH (ref 4.8–10.8)

## 2024-06-03 PROCEDURE — 84100 ASSAY OF PHOSPHORUS: CPT

## 2024-06-03 PROCEDURE — 85014 HEMATOCRIT: CPT

## 2024-06-03 PROCEDURE — 93005 ELECTROCARDIOGRAM TRACING: CPT

## 2024-06-03 PROCEDURE — 86850 RBC ANTIBODY SCREEN: CPT

## 2024-06-03 PROCEDURE — 84295 ASSAY OF SERUM SODIUM: CPT

## 2024-06-03 PROCEDURE — 76770 US EXAM ABDO BACK WALL COMP: CPT

## 2024-06-03 PROCEDURE — 82330 ASSAY OF CALCIUM: CPT

## 2024-06-03 PROCEDURE — 97116 GAIT TRAINING THERAPY: CPT | Mod: GP

## 2024-06-03 PROCEDURE — 0225U NFCT DS DNA&RNA 21 SARSCOV2: CPT

## 2024-06-03 PROCEDURE — 71045 X-RAY EXAM CHEST 1 VIEW: CPT | Mod: 26,77

## 2024-06-03 PROCEDURE — 85018 HEMOGLOBIN: CPT

## 2024-06-03 PROCEDURE — 83605 ASSAY OF LACTIC ACID: CPT

## 2024-06-03 PROCEDURE — 36415 COLL VENOUS BLD VENIPUNCTURE: CPT

## 2024-06-03 PROCEDURE — 76770 US EXAM ABDO BACK WALL COMP: CPT | Mod: 26

## 2024-06-03 PROCEDURE — 99223 1ST HOSP IP/OBS HIGH 75: CPT

## 2024-06-03 PROCEDURE — 84132 ASSAY OF SERUM POTASSIUM: CPT

## 2024-06-03 PROCEDURE — 83010 ASSAY OF HAPTOGLOBIN QUANT: CPT

## 2024-06-03 PROCEDURE — 84484 ASSAY OF TROPONIN QUANT: CPT

## 2024-06-03 PROCEDURE — 92526 ORAL FUNCTION THERAPY: CPT | Mod: GN

## 2024-06-03 PROCEDURE — 85045 AUTOMATED RETICULOCYTE COUNT: CPT

## 2024-06-03 PROCEDURE — 70450 CT HEAD/BRAIN W/O DYE: CPT | Mod: 26,MC

## 2024-06-03 PROCEDURE — 71045 X-RAY EXAM CHEST 1 VIEW: CPT | Mod: 26

## 2024-06-03 PROCEDURE — 83735 ASSAY OF MAGNESIUM: CPT

## 2024-06-03 PROCEDURE — 84145 PROCALCITONIN (PCT): CPT

## 2024-06-03 PROCEDURE — 80053 COMPREHEN METABOLIC PANEL: CPT

## 2024-06-03 PROCEDURE — 82803 BLOOD GASES ANY COMBINATION: CPT

## 2024-06-03 PROCEDURE — 86900 BLOOD TYPING SEROLOGIC ABO: CPT

## 2024-06-03 PROCEDURE — 93306 TTE W/DOPPLER COMPLETE: CPT | Mod: 26

## 2024-06-03 PROCEDURE — 80307 DRUG TEST PRSMV CHEM ANLYZR: CPT

## 2024-06-03 PROCEDURE — 92610 EVALUATE SWALLOWING FUNCTION: CPT | Mod: GN

## 2024-06-03 PROCEDURE — 83615 LACTATE (LD) (LDH) ENZYME: CPT

## 2024-06-03 PROCEDURE — 93010 ELECTROCARDIOGRAM REPORT: CPT

## 2024-06-03 PROCEDURE — 36430 TRANSFUSION BLD/BLD COMPNT: CPT

## 2024-06-03 PROCEDURE — 87040 BLOOD CULTURE FOR BACTERIA: CPT

## 2024-06-03 PROCEDURE — 87640 STAPH A DNA AMP PROBE: CPT

## 2024-06-03 PROCEDURE — 85379 FIBRIN DEGRADATION QUANT: CPT

## 2024-06-03 PROCEDURE — 87641 MR-STAPH DNA AMP PROBE: CPT

## 2024-06-03 PROCEDURE — 93970 EXTREMITY STUDY: CPT

## 2024-06-03 PROCEDURE — 86901 BLOOD TYPING SEROLOGIC RH(D): CPT

## 2024-06-03 PROCEDURE — 93970 EXTREMITY STUDY: CPT | Mod: 26

## 2024-06-03 PROCEDURE — 97530 THERAPEUTIC ACTIVITIES: CPT | Mod: GP

## 2024-06-03 PROCEDURE — 71045 X-RAY EXAM CHEST 1 VIEW: CPT

## 2024-06-03 PROCEDURE — 86923 COMPATIBILITY TEST ELECTRIC: CPT

## 2024-06-03 PROCEDURE — 85025 COMPLETE CBC W/AUTO DIFF WBC: CPT

## 2024-06-03 PROCEDURE — 93306 TTE W/DOPPLER COMPLETE: CPT

## 2024-06-03 PROCEDURE — P9016: CPT

## 2024-06-03 PROCEDURE — 85027 COMPLETE CBC AUTOMATED: CPT

## 2024-06-03 PROCEDURE — 97162 PT EVAL MOD COMPLEX 30 MIN: CPT | Mod: GP

## 2024-06-03 PROCEDURE — 82550 ASSAY OF CK (CPK): CPT

## 2024-06-03 PROCEDURE — 80048 BASIC METABOLIC PNL TOTAL CA: CPT

## 2024-06-03 PROCEDURE — 74177 CT ABD & PELVIS W/CONTRAST: CPT | Mod: 26,MC

## 2024-06-03 RX ORDER — SODIUM CHLORIDE 9 MG/ML
1000 INJECTION, SOLUTION INTRAVENOUS
Refills: 0 | Status: DISCONTINUED | OUTPATIENT
Start: 2024-06-03 | End: 2024-06-03

## 2024-06-03 RX ORDER — INSULIN HUMAN 100 [IU]/ML
7 INJECTION, SOLUTION SUBCUTANEOUS ONCE
Refills: 0 | Status: COMPLETED | OUTPATIENT
Start: 2024-06-03 | End: 2024-06-03

## 2024-06-03 RX ORDER — HEPARIN SODIUM 5000 [USP'U]/ML
5000 INJECTION INTRAVENOUS; SUBCUTANEOUS EVERY 8 HOURS
Refills: 0 | Status: DISCONTINUED | OUTPATIENT
Start: 2024-06-03 | End: 2024-06-08

## 2024-06-03 RX ORDER — CALCIUM GLUCONATE 100 MG/ML
1 VIAL (ML) INTRAVENOUS ONCE
Refills: 0 | Status: COMPLETED | OUTPATIENT
Start: 2024-06-03 | End: 2024-06-03

## 2024-06-03 RX ORDER — NICOTINE POLACRILEX 2 MG
1 GUM BUCCAL DAILY
Refills: 0 | Status: DISCONTINUED | OUTPATIENT
Start: 2024-06-03 | End: 2024-06-08

## 2024-06-03 RX ORDER — DEXTROSE 50 % IN WATER 50 %
50 SYRINGE (ML) INTRAVENOUS ONCE
Refills: 0 | Status: COMPLETED | OUTPATIENT
Start: 2024-06-03 | End: 2024-06-03

## 2024-06-03 RX ORDER — CEFTRIAXONE 500 MG/1
1000 INJECTION, POWDER, FOR SOLUTION INTRAMUSCULAR; INTRAVENOUS EVERY 24 HOURS
Refills: 0 | Status: DISCONTINUED | OUTPATIENT
Start: 2024-06-03 | End: 2024-06-06

## 2024-06-03 RX ORDER — FOLIC ACID 0.8 MG
1 TABLET ORAL DAILY
Refills: 0 | Status: DISCONTINUED | OUTPATIENT
Start: 2024-06-03 | End: 2024-06-08

## 2024-06-03 RX ORDER — DIAZEPAM 5 MG
20 TABLET ORAL ONCE
Refills: 0 | Status: DISCONTINUED | OUTPATIENT
Start: 2024-06-03 | End: 2024-06-03

## 2024-06-03 RX ORDER — SODIUM ZIRCONIUM CYCLOSILICATE 10 G/10G
10 POWDER, FOR SUSPENSION ORAL ONCE
Refills: 0 | Status: COMPLETED | OUTPATIENT
Start: 2024-06-03 | End: 2024-06-03

## 2024-06-03 RX ORDER — CEFEPIME 1 G/1
1000 INJECTION, POWDER, FOR SOLUTION INTRAMUSCULAR; INTRAVENOUS EVERY 12 HOURS
Refills: 0 | Status: DISCONTINUED | OUTPATIENT
Start: 2024-06-03 | End: 2024-06-03

## 2024-06-03 RX ORDER — SODIUM CHLORIDE 9 MG/ML
1800 INJECTION, SOLUTION INTRAVENOUS ONCE
Refills: 0 | Status: COMPLETED | OUTPATIENT
Start: 2024-06-03 | End: 2024-06-03

## 2024-06-03 RX ORDER — CEFEPIME 1 G/1
1000 INJECTION, POWDER, FOR SOLUTION INTRAMUSCULAR; INTRAVENOUS ONCE
Refills: 0 | Status: COMPLETED | OUTPATIENT
Start: 2024-06-03 | End: 2024-06-03

## 2024-06-03 RX ORDER — SODIUM BICARBONATE 1 MEQ/ML
0.25 SYRINGE (ML) INTRAVENOUS
Qty: 150 | Refills: 0 | Status: DISCONTINUED | OUTPATIENT
Start: 2024-06-03 | End: 2024-06-04

## 2024-06-03 RX ORDER — DIAZEPAM 5 MG
10 TABLET ORAL ONCE
Refills: 0 | Status: DISCONTINUED | OUTPATIENT
Start: 2024-06-03 | End: 2024-06-03

## 2024-06-03 RX ORDER — ONDANSETRON 8 MG/1
4 TABLET, FILM COATED ORAL ONCE
Refills: 0 | Status: COMPLETED | OUTPATIENT
Start: 2024-06-03 | End: 2024-06-03

## 2024-06-03 RX ORDER — THIAMINE MONONITRATE (VIT B1) 100 MG
100 TABLET ORAL DAILY
Refills: 0 | Status: DISCONTINUED | OUTPATIENT
Start: 2024-06-03 | End: 2024-06-08

## 2024-06-03 RX ORDER — POLYETHYLENE GLYCOL 3350 17 G/17G
17 POWDER, FOR SOLUTION ORAL
Refills: 0 | Status: DISCONTINUED | OUTPATIENT
Start: 2024-06-03 | End: 2024-06-08

## 2024-06-03 RX ORDER — MAGNESIUM SULFATE 500 MG/ML
2 VIAL (ML) INJECTION ONCE
Refills: 0 | Status: COMPLETED | OUTPATIENT
Start: 2024-06-03 | End: 2024-06-03

## 2024-06-03 RX ORDER — SENNA PLUS 8.6 MG/1
2 TABLET ORAL AT BEDTIME
Refills: 0 | Status: DISCONTINUED | OUTPATIENT
Start: 2024-06-03 | End: 2024-06-08

## 2024-06-03 RX ORDER — THIAMINE MONONITRATE (VIT B1) 100 MG
100 TABLET ORAL ONCE
Refills: 0 | Status: COMPLETED | OUTPATIENT
Start: 2024-06-03 | End: 2024-06-03

## 2024-06-03 RX ORDER — CHLORHEXIDINE GLUCONATE 213 G/1000ML
1 SOLUTION TOPICAL DAILY
Refills: 0 | Status: DISCONTINUED | OUTPATIENT
Start: 2024-06-03 | End: 2024-06-08

## 2024-06-03 RX ORDER — ALBUTEROL 90 UG/1
2.5 AEROSOL, METERED ORAL
Refills: 0 | Status: COMPLETED | OUTPATIENT
Start: 2024-06-03 | End: 2024-06-03

## 2024-06-03 RX ORDER — VANCOMYCIN HCL 1 G
1000 VIAL (EA) INTRAVENOUS ONCE
Refills: 0 | Status: COMPLETED | OUTPATIENT
Start: 2024-06-03 | End: 2024-06-03

## 2024-06-03 RX ORDER — ACETAMINOPHEN 500 MG
650 TABLET ORAL EVERY 6 HOURS
Refills: 0 | Status: DISCONTINUED | OUTPATIENT
Start: 2024-06-03 | End: 2024-06-08

## 2024-06-03 RX ORDER — PANTOPRAZOLE SODIUM 20 MG/1
40 TABLET, DELAYED RELEASE ORAL
Refills: 0 | Status: DISCONTINUED | OUTPATIENT
Start: 2024-06-03 | End: 2024-06-08

## 2024-06-03 RX ADMIN — CEFEPIME 100 MILLIGRAM(S): 1 INJECTION, POWDER, FOR SOLUTION INTRAMUSCULAR; INTRAVENOUS at 04:14

## 2024-06-03 RX ADMIN — SODIUM CHLORIDE 100 MILLILITER(S): 9 INJECTION, SOLUTION INTRAVENOUS at 05:52

## 2024-06-03 RX ADMIN — INSULIN HUMAN 7 UNIT(S): 100 INJECTION, SOLUTION SUBCUTANEOUS at 01:18

## 2024-06-03 RX ADMIN — Medication 200 GRAM(S): at 00:49

## 2024-06-03 RX ADMIN — Medication 25 GRAM(S): at 14:43

## 2024-06-03 RX ADMIN — PANTOPRAZOLE SODIUM 40 MILLIGRAM(S): 20 TABLET, DELAYED RELEASE ORAL at 05:53

## 2024-06-03 RX ADMIN — Medication 50 MILLILITER(S): at 00:49

## 2024-06-03 RX ADMIN — SODIUM ZIRCONIUM CYCLOSILICATE 10 GRAM(S): 10 POWDER, FOR SUSPENSION ORAL at 05:53

## 2024-06-03 RX ADMIN — Medication 1 PATCH: at 19:00

## 2024-06-03 RX ADMIN — CEFTRIAXONE 100 MILLIGRAM(S): 500 INJECTION, POWDER, FOR SOLUTION INTRAMUSCULAR; INTRAVENOUS at 13:25

## 2024-06-03 RX ADMIN — HEPARIN SODIUM 5000 UNIT(S): 5000 INJECTION INTRAVENOUS; SUBCUTANEOUS at 13:25

## 2024-06-03 RX ADMIN — HEPARIN SODIUM 5000 UNIT(S): 5000 INJECTION INTRAVENOUS; SUBCUTANEOUS at 05:52

## 2024-06-03 RX ADMIN — Medication 20 MILLIGRAM(S): at 02:28

## 2024-06-03 RX ADMIN — Medication 1 MILLIGRAM(S): at 12:55

## 2024-06-03 RX ADMIN — Medication 100 MILLIGRAM(S): at 12:55

## 2024-06-03 RX ADMIN — ALBUTEROL 2.5 MILLIGRAM(S): 90 AEROSOL, METERED ORAL at 02:52

## 2024-06-03 RX ADMIN — Medication 1 PATCH: at 12:55

## 2024-06-03 RX ADMIN — ALBUTEROL 2.5 MILLIGRAM(S): 90 AEROSOL, METERED ORAL at 01:18

## 2024-06-03 RX ADMIN — Medication 10 MILLIGRAM(S): at 00:47

## 2024-06-03 RX ADMIN — SODIUM ZIRCONIUM CYCLOSILICATE 10 GRAM(S): 10 POWDER, FOR SUSPENSION ORAL at 15:12

## 2024-06-03 RX ADMIN — HEPARIN SODIUM 5000 UNIT(S): 5000 INJECTION INTRAVENOUS; SUBCUTANEOUS at 21:33

## 2024-06-03 RX ADMIN — SODIUM CHLORIDE 1000 MILLILITER(S): 9 INJECTION, SOLUTION INTRAVENOUS at 04:09

## 2024-06-03 RX ADMIN — ALBUTEROL 2.5 MILLIGRAM(S): 90 AEROSOL, METERED ORAL at 00:55

## 2024-06-03 RX ADMIN — SODIUM CHLORIDE 1800 MILLILITER(S): 9 INJECTION, SOLUTION INTRAVENOUS at 00:48

## 2024-06-03 RX ADMIN — CEFEPIME 100 MILLIGRAM(S): 1 INJECTION, POWDER, FOR SOLUTION INTRAMUSCULAR; INTRAVENOUS at 06:16

## 2024-06-03 RX ADMIN — Medication 4 MILLIGRAM(S): at 09:24

## 2024-06-03 RX ADMIN — Medication 100 MILLIGRAM(S): at 02:27

## 2024-06-03 RX ADMIN — Medication 250 MILLIGRAM(S): at 02:51

## 2024-06-03 RX ADMIN — SODIUM CHLORIDE 1800 MILLILITER(S): 9 INJECTION, SOLUTION INTRAVENOUS at 03:10

## 2024-06-03 RX ADMIN — SODIUM ZIRCONIUM CYCLOSILICATE 10 GRAM(S): 10 POWDER, FOR SUSPENSION ORAL at 21:33

## 2024-06-03 RX ADMIN — ONDANSETRON 4 MILLIGRAM(S): 8 TABLET, FILM COATED ORAL at 00:47

## 2024-06-03 RX ADMIN — Medication 650 MILLIGRAM(S): at 06:09

## 2024-06-03 RX ADMIN — Medication 650 MILLIGRAM(S): at 07:31

## 2024-06-03 NOTE — H&P ADULT - HISTORY OF PRESENT ILLNESS
64-year-old male with past medical history of EtOH use disorder, throat cancer status post chemotherapy and radiation last November presents to the emergency department from home for syncopal episode witnessed by family. Patient states he has been weak, dizzy, fatigued recently. Family was helping him to the bathroom when he had a syncopal episode but the family caught him so there was no head trauma or any other trauma.  Patient is not on anticoagulation.  EMS reported initial BP 74/52 and satting 84% on room air. EMS gave 300 cc NS bolus and had him on 6L NC.  Patient was recently discharged 2 days ago and he was admitted for alcohol withdrawal in the last admission and his last drink was 8 days ago and he feels like he is withdrawing. He has had withdrawal seizures before.    In ED, patient's BP was 88/53 s/p 2L LR with improvement  Labs show WBC 17K, K+ 6.5--->5.4, AG 28, Cr 2.7 (bl 1.5), Trops 88  pH 7.21-->7.12, Lactate 14.2-->5.2  CT scan showed patchy bilateral opacities concerning pneumonia  Admitted to ICU for Sepsis secondary to pneumonia

## 2024-06-03 NOTE — ED PROVIDER NOTE - NSICDXPASTMEDICALHX_GEN_ALL_CORE_FT
PAST MEDICAL HISTORY:  History of alcohol use disorder     HTN (hypertension)     Throat cancer

## 2024-06-03 NOTE — PHYSICAL THERAPY INITIAL EVALUATION ADULT - GENERAL OBSERVATIONS, REHAB EVAL
ICU. [id number] pm. 65 y/o M rec'd/left in bed, nad, + tele, 4L O2, blood transfusion, wife present. pt is A+Ox4, following VC's, appears irritable - especially towards his wife. pt found to have wet sheets (pt not aware) - pt transferred sit <> stand with mod assist to have linens changed. vitals: 135/65 78 97% on 4L O2.

## 2024-06-03 NOTE — ED PROVIDER NOTE - PROGRESS NOTE DETAILS
BF: Sepsis activated 00:22 for elevated lactate, hypotension, possible PNA or GI source. Dr. Beronica Herman, DO: K 6.6 on vbg, given calcium gluconate, insulin, dextrose, albuterol BF: work-up c/w alcoholic ketoacidosis. Sepsis present, PNA, which was treated. Markedly eleevated lactate, probable seizure PTA--pt found covered in urine but not postictal. Pt tells me "I wouldn't know if I seized if it hit me over the head". PT also with alcohol withdrawal. Admitted to MICU.

## 2024-06-03 NOTE — PATIENT PROFILE ADULT - PATIENT REPRESENTATIVE: ( YOU CAN CHOOSE ANY PERSON THAT CAN ASSIST YOU WITH YOUR HEALTH CARE PREFERENCES, DOES NOT HAVE TO BE A SPOUSE, IMMEDIATE FAMILY OR SIGNIFICANT OTHER/PARTNER)
70yFemale with diagnosis: PNEUMONIA;SEVERE SEPSIS;PLEURAL EFFUSION      Patient seen, failed SBT again today. As per primary team, ?family considering vent withdrawal.  Awaiting daughter's arrival to hospital    PHYSICAL EXAM unchanged      T(C): , Max: 37.9 (23:42)  T(F): 99.9  HR: 101 (86 - 101)  BP: 110/58 (90/50 - 110/58)  RR: 20 (20 - 27)  SpO2: 100% (99% - 100%)              LABS:                          7.9    27.85 )-----------( 162      ( 02 May 2018 06:32 )             24.2                                                                                      05-02    145  |  108  |  61<HH>  ----------------------------<  82  2.8<L>   |  19  |  2.5<H>    Ca    7.7<L>      02 May 2018 06:32                                                        MEDICATIONS  (STANDING):  dextrose 5% + sodium chloride 0.45%. 1000 milliLiter(s) (75 mL/Hr) IV Continuous <Continuous>  gabapentin 100 milliGRAM(s) Oral three times a day  heparin  Injectable 5000 Unit(s) SubCutaneous every 8 hours  midodrine 10 milliGRAM(s) Oral three times a day  pantoprazole   Suspension 40 milliGRAM(s) Oral before breakfast  piperacillin/tazobactam IVPB. 3.375 Gram(s) IV Intermittent every 6 hours  potassium chloride  20 mEq/100 mL IVPB 20 milliEquivalent(s) IV Intermittent every 2 hours  vancomycin  IVPB 1000 milliGRAM(s) IV Intermittent daily    MEDICATIONS  (PRN):  acetaminophen  Suppository 650 milliGRAM(s) Rectal every 6 hours PRN For Temp greater than 38 C (100.4 F)  morphine  - Injectable 2 milliGRAM(s) IV Push every 4 hours PRN dyspnea and pain same name as above

## 2024-06-03 NOTE — ED PROVIDER NOTE - CLINICAL SUMMARY MEDICAL DECISION MAKING FREE TEXT BOX
63 y/o M h/o etoh use disorder, throat CA in remission, COPD not on home O2, smoker p/w syncope, N/V, tremors. Last drink 8 days ago. States feels like in withdrawal. No bleeding events. NBNB emesis. No other complaints. Said earlier today his head "popped" and he "lost hearing on the L ear"    BIBEMS, pt hypoxic to 70s on RA. Placed on 3L NC. Hypotensive, received 500 cc in field with some improvement, still hypotensive here. Agree with exam as documented by resident above.    Concerned for withdrawal, acs, trauma, electrolyte abnormality, progression of disease, hyper/hypoglycemia.     PLAN: CTH, CTAP, CXR, sepsis work up.    Attending Statement:  I have personally seen the patient. I provided critical care for  a total of 35 minutes. I provided a substantive portion of the care and the majority of the critical care time.

## 2024-06-03 NOTE — CONSULT NOTE ADULT - SUBJECTIVE AND OBJECTIVE BOX
Patient is a 64y old  Male who presents with a chief complaint of syncope  (03 Jun 2024 04:20)      HPI:  64-year-old male with past medical history of EtOH use disorder, throat cancer status post chemotherapy and radiation last November presents to the emergency department from home for syncopal episode witnessed by family. Patient states he has been weak, dizzy, fatigued recently. Family was helping him to the bathroom when he had a syncopal episode but the family caught him so there was no head trauma or any other trauma.  Patient is not on anticoagulation.  EMS reported initial BP 74/52 and satting 84% on room air. EMS gave 300 cc NS bolus and had him on 6L NC.  Patient was recently discharged 2 days ago and he was admitted for alcohol withdrawal in the last admission and his last drink was 8 days ago and he feels like he is withdrawing. He has had withdrawal seizures before.    In ED, patient's BP was 88/53 s/p 2L LR with improvement  Labs show WBC 17K, K+ 6.5--->5.4, AG 28, Cr 2.7 (bl 1.5), Trops 88  pH 7.21-->7.12, Lactate 14.2-->5.2  CT scan showed patchy bilateral opacities concerning pneumonia  Admitted to ICU for Sepsis secondary to pneumonia (03 Jun 2024 04:20)      PAST MEDICAL & SURGICAL HISTORY:  Throat cancer      HTN (hypertension)      History of alcohol use disorder          SOCIAL HX:   Smoking       Positive                   ETOH      Positive                       Other    FAMILY HISTORY:  :  No known cardiovacular family hisotry     Review Of Systems:     All ROS are negative except per HPI       Allergies    No Known Allergies    Intolerances          PHYSICAL EXAM    ICU Vital Signs Last 24 Hrs  T(C): 37.4 (03 Jun 2024 08:00), Max: 39 (03 Jun 2024 05:00)  T(F): 99.3 (03 Jun 2024 08:00), Max: 102.2 (03 Jun 2024 05:00)  HR: 78 (03 Jun 2024 08:00) (78 - 88)  BP: 99/55 (03 Jun 2024 08:00) (88/53 - 132/66)  BP(mean): 73 (03 Jun 2024 08:00) (69 - 108)  ABP: --  ABP(mean): --  RR: 12 (03 Jun 2024 08:00) (8 - 19)  SpO2: 98% (03 Jun 2024 08:00) (84% - 100%)    O2 Parameters below as of 03 Jun 2024 08:00  Patient On (Oxygen Delivery Method): nasal cannula  O2 Flow (L/min): 5          CONSTITUTIONAL:  Ill appearing in NAD    ENT:   Airway patent,   Mouth with normal mucosa.       CARDIAC:   Normal rate,   Regular rhythm.        RESPIRATORY:   No wheezing  Bilateral BS   Not tachypneic,  No use of accessory muscles    GASTROINTESTINAL:  Abdomen soft,   Non-tender,   No guarding,   + BS      NEUROLOGICAL:   Alert  Follows commands   No motor deficits.    SKIN:   Skin normal color for race,   No evidence of rash.          06-02-24 @ 07:01  -  06-03-24 @ 07:00  --------------------------------------------------------  IN:    IV PiggyBack: 300 mL    Lactated Ringers: 200 mL  Total IN: 500 mL    OUT:    Voided (mL): 400 mL  Total OUT: 400 mL    Total NET: 100 mL      06-03-24 @ 07:01  -  06-03-24 @ 08:40  --------------------------------------------------------  IN:    Lactated Ringers: 100 mL  Total IN: 100 mL    OUT:    Voided (mL): 290 mL  Total OUT: 290 mL    Total NET: -190 mL          LABS:                          9.9    17.76 )-----------( 395      ( 03 Jun 2024 00:30 )             30.1                                               06-03    139  |  94<L>  |  34<H>  ----------------------------<  96  6.5<HH>   |  17  |  2.7<H>    Creatinine Trend  BUN 34, Cr 2.7, (06-03-24 @ 00:30)  Creatinine Trend  BUN 17, Cr 1.5, (05-31-24 @ 00:48)  Creatinine Trend  BUN 23, Cr 1.6, (05-30-24 @ 00:37)      Ca    9.8      03 Jun 2024 00:30  Phos  8.3     06-03  Mg     2.1     06-03    TPro  6.5  /  Alb  4.1  /  TBili  0.4  /  DBili  x   /  AST  43<H>  /  ALT  60<H>  /  AlkPhos  81  06-03      PT/INR - ( 03 Jun 2024 00:30 )   PT: 11.40 sec;   INR: 1.00 ratio         PTT - ( 03 Jun 2024 00:30 )  PTT:34.7 sec                                       Urinalysis Basic - ( 03 Jun 2024 00:30 )    Color: x / Appearance: x / SG: x / pH: x  Gluc: 96 mg/dL / Ketone: x  / Bili: x / Urobili: x   Blood: x / Protein: x / Nitrite: x   Leuk Esterase: x / RBC: x / WBC x   Sq Epi: x / Non Sq Epi: x / Bacteria: x                                                  LIVER FUNCTIONS - ( 03 Jun 2024 00:30 )  Alb: 4.1 g/dL / Pro: 6.5 g/dL / ALK PHOS: 81 U/L / ALT: 60 U/L / AST: 43 U/L / GGT: x                                                                                                                                       X-Rays reviewed                                                                                     ECHO        MEDICATIONS  (STANDING):  cefepime   IVPB 1000 milliGRAM(s) IV Intermittent every 12 hours  chlorhexidine 4% Liquid 1 Application(s) Topical daily  folic acid 1 milliGRAM(s) Oral daily  heparin   Injectable 5000 Unit(s) SubCutaneous every 8 hours  lactated ringers. 1000 milliLiter(s) (100 mL/Hr) IV Continuous <Continuous>  levoFLOXacin IVPB 750 milliGRAM(s) IV Intermittent every 48 hours  nicotine - 21 mG/24Hr(s) Patch 1 Patch Transdermal daily  pantoprazole    Tablet 40 milliGRAM(s) Oral before breakfast  thiamine 100 milliGRAM(s) Oral daily    MEDICATIONS  (PRN):  acetaminophen     Tablet .. 650 milliGRAM(s) Oral every 6 hours PRN Temp greater or equal to 38C (100.4F)  LORazepam   Injectable 2 milliGRAM(s) IV Push every 2 hours PRN Symptom-triggered: each CIWA -Ar score 8 or GREATER  polyethylene glycol 3350 17 Gram(s) Oral two times a day PRN Constipation  senna 2 Tablet(s) Oral at bedtime PRN Constipation

## 2024-06-03 NOTE — CONSULT NOTE ADULT - ASSESSMENT
IMPRESSION:    Pneumonia possible aspiration   Recent hospital stay for ETOH withdrawal    SERVANDO   HO ETOH use disorder  HO Throat cancer   AKA     PLAN:    CNS: Thiamine and Folate CIWA protocol.  CATCH.  UDS SDS     HEENT: Oral care.  Speech and swallow.      PULMONARY:  HOB @ 45 degrees.  Aspiration precautions.  CXR noted.  Wean o2 as tolerated.  repeat CXR in am     CARDIOVASCULAR:  GDFR.  ECHO.  Avoid overload.  trend Lactate and CPK.  NaHCo3 drip for now.  Monitor Ph and HCO3     GI: GI prophylaxis.  Feeding per speech.  Bowel regimen     RENAL:  Follow up lytes.  Correct as needed.  CTA with no hydro.  Lokelma.  SP K therapy.  repeat Lytes.      INFECTIOUS DISEASE: Follow up cultures.  RVP neg.  Rocephin and Levaquin.  Nasal MRSA.  Urine Legionella and strep Ags      HEMATOLOGICAL:  DVT prophylaxis.  Dimer.        ENDOCRINE:  Follow up FS.  Insulin protocol if needed.    MUSCULOSKELETAL:  OOB to chair    PT OT     Possible downgrade PM

## 2024-06-03 NOTE — PHYSICAL THERAPY INITIAL EVALUATION ADULT - PERTINENT HX OF CURRENT PROBLEM, REHAB EVAL
64-year-old male with past medical history of EtOH use disorder, throat cancer status post chemotherapy and radiation last November presents to the emergency department from home for syncopal episode witnessed by family. Patient states he has been weak, dizzy, fatigued recently. Family was helping him to the bathroom when he had a syncopal episode but the family caught him so there was no head trauma or any other trauma.  Patient is not on anticoagulation.  EMS reported initial BP 74/52 and satting 84% on room air. EMS gave 300 cc NS bolus and had him on 6L NC.  Patient was recently discharged 2 days ago and he was admitted for alcohol withdrawal in the last admission and his last drink was 8 days ago and he feels like he is withdrawing. He has had withdrawal seizures before.

## 2024-06-03 NOTE — H&P ADULT - NSHPPHYSICALEXAM_GEN_ALL_CORE
Constitutional; No acute distress  Head: Atraumatic, normocephalic  Lungs: bilateral crackles  Heart: S1, S2+; no murmurs  Abd: Soft non tender non distended  Ext: no pedal edema  Neuro: AOX3  Skin: No rashes or lesions

## 2024-06-03 NOTE — ED PROVIDER NOTE - PHYSICAL EXAMINATION
GENERAL: Chronically ill appearing, disheveled, no acute distress   SKIN: warm, dry  HEAD: Normocephalic; atraumatic.  EYES: 1mm pupils, PERRLA, EOMI, no conjunctival erythema  ENT: No nasal discharge; airway clear. Dry mucous membranes   NECK: Supple; non tender.  CARD: S1, S2 normal; no murmurs, gallops, or rubs. Regular rate and rhythm.   RESP: LCTAB; No wheezes, rales, rhonchi, or stridor.  ABD: soft, diffuse TTP, nondistended  EXT: Moving all extremities   NEURO: A/ox3, tremulous, grossly unremarkable  PSYCH: Cooperative, appropriate.

## 2024-06-03 NOTE — H&P ADULT - ASSESSMENT
IMPRESSION:  Sepsis secondary to underlying pneumonia  Lactic acidosis  Hyperkalemia  SERVANDO on CKD3  ?Alcohol withdrawal  Hx throat Ca s/p chemo and radiotherapy    PLAN:    CNS: CTH negative. CIWA protocol    HEENT: Oral care    PULMONARY:  HOB @ 45 degrees.  Aspiration precautions. Wean O2 as tolerated    CARDIOVASCULAR: Trend trops. F/u TTE    GI: GI prophylaxis. NPO. SpSw Eval    RENAL: C/w LR @ 100cc/hr. Trend Lactate. Lokelma 10mg now. Follow up lytes. Correct as needed. F/u RBUS. F/u UA    ID: F/u cultures. C/w cefepime and Levaquin. F/u procal, MRSA. RVP negative    HEME: DVT prophylaxis with SC heparin    ENDOCRINE:  Follow up FS.  Insulin protocol if needed.    MUSCULOSKELETAL: Ambulate as tolerated    Possible Downgrade to SDU in AM

## 2024-06-03 NOTE — PATIENT PROFILE ADULT - FALL HARM RISK - HARM RISK INTERVENTIONS
Assistance with ambulation/Assistance OOB with selected safe patient handling equipment/Communicate Risk of Fall with Harm to all staff/Monitor for mental status changes/Monitor gait and stability/Reinforce activity limits and safety measures with patient and family/Tailored Fall Risk Interventions/Toileting schedule using arm’s reach rule for commode and bathroom/Use of alarms - bed, chair and/or voice tab/Visual Cue: Yellow wristband and red socks/Bed in lowest position, wheels locked, appropriate side rails in place/Call bell, personal items and telephone in reach/Instruct patient to call for assistance before getting out of bed or chair/Non-slip footwear when patient is out of bed/[place] to call system/Physically safe environment - no spills, clutter or unnecessary equipment/Purposeful Proactive Rounding/Room/bathroom lighting operational, light cord in reach

## 2024-06-03 NOTE — H&P ADULT - NSHPLABSRESULTS_GEN_ALL_CORE
(06-03 @ 00:30)                      9.9  17.76 )-----------( 395                 30.1    Neutrophils = 14.42 (81.1%)  Lymphocytes = 0.42 (2.4%)  Eosinophils = 0.00 (0.0%)  Basophils = 0.05 (0.3%)  Monocytes = 2.63 (14.8%)  Bands = --%    06-03    139  |  94<L>  |  34<H>  ----------------------------<  96  6.5<HH>   |  17  |  2.7<H>    Ca    9.8      03 Jun 2024 00:30  Phos  8.3     06-03  Mg     2.1     06-03    TPro  6.5  /  Alb  4.1  /  TBili  0.4  /  DBili  x   /  AST  43<H>  /  ALT  60<H>  /  AlkPhos  81  06-03    ( 03 Jun 2024 00:30 )   PT: 11.40 sec;   INR: 1.00 ratio;       PTT:34.7 sec      RVP:    Venous Blood Gas:  06-03 @ 03:29  7.21/63/25/25/28.6  VBG Lactate: 5.2  Venous Blood Gas:  06-03 @ 00:22  7.12/57/20/18/23.9  VBG Lactate: 13.9        Tox:         Urinalysis Basic - ( 03 Jun 2024 00:30 )    Color: x / Appearance: x / SG: x / pH: x  Gluc: 96 mg/dL / Ketone: x  / Bili: x / Urobili: x   Blood: x / Protein: x / Nitrite: x   Leuk Esterase: x / RBC: x / WBC x   Sq Epi: x / Non Sq Epi: x / Bacteria: x    < from: CT Abdomen and Pelvis w/ IV Cont (06.03.24 @ 01:50) >    IMPRESSION:    Patchy consolidative opacities in the bilateral lung bases, suggestive of   pneumonia.    --- End of Report ---      < end of copied text >    < from: CT Head No Cont (06.03.24 @ 01:41) >      IMPRESSION:    No acute intracranial pathology.    --- End of Report ---    < end of copied text >

## 2024-06-03 NOTE — ED PROVIDER NOTE - CARE PLAN
1 Principal Discharge DX:	Alcohol dependence with withdrawal  Secondary Diagnosis:	Alcoholic ketoacidosis  Secondary Diagnosis:	Sepsis due to pneumonia  Secondary Diagnosis:	Hyperkalemia  Secondary Diagnosis:	Elevated troponin

## 2024-06-03 NOTE — SWALLOW BEDSIDE ASSESSMENT ADULT - COMMENTS
Attempted to see pt in AM for dysphagia eval, s/p Ativan a 10am, on Ativan taper. Unarousable, not appropriate for po trials. SLP will f/u in PM prior to next ativan dose.

## 2024-06-03 NOTE — PATIENT PROFILE ADULT - HAVE YOU RECENTLY LOST WEIGHT WITHOUT TRYING?
04/30/22 0734   Provider Notification   Provider Name/Title Dr. Garduno   Reason for Notification Decels;Status update   Method of Notification Electronic Page;Phone   Request Evaluate - Remote   Response Other (Comment)   MD notified of prolonged deceleration and interventions done (turning of pitocin, position change, fluid bolus). Per MD, restart pitocin after 20 minutes of FHR WNL.   No (0)

## 2024-06-03 NOTE — ED PROVIDER NOTE - OBJECTIVE STATEMENT
64-year-old male, with past medical history of EtOH use disorder, throat cancer status post chemotherapy and radiation last November, presents to the emergency department from home for syncopal episode witnessed by family.  Patient states he has been weak, dizzy, fatigued recently.  Family was helping him to the bathroom when he had a syncopal episode but the family caught him so there was no head trauma or any other trauma.  Patient is not on anticoagulation.  EMS reported initial BP 74/52 and satting 84% on room air.  EMS gave 300 cc NS bolus and had him on 6L NC.  Patient's last drink was 8 days ago and he feels like he is withdrawing.  He has had withdrawal seizures before.

## 2024-06-03 NOTE — PROGRESS NOTE ADULT - ASSESSMENT
IMPRESSION:  Sepsis secondary to underlying pneumonia  Lactic acidosis  Hyperkalemia  SERVANDO on CKD3  ?Alcohol withdrawal  Hx throat Ca s/p chemo and radiotherapy    PLAN:    CNS: CTH negative. CIWA protocol    HEENT: Oral care    PULMONARY:  HOB @ 45 degrees.  Aspiration precautions. Wean O2 as tolerated    CARDIOVASCULAR: Trend trops. F/u TTE    GI: GI prophylaxis. Pending S&S eval     RENAL: Trend Lactate. Lokelma 10mg now. Start bicarb drip. Follow up lytes. Correct as needed. F/u RBUS. F/u UA    ID: F/u cultures. On Rocephin and levaquin. F/u procal, MRSA. RVP negative    HEME: DVT prophylaxis with SC heparin    ENDOCRINE:  Follow up FS.  Insulin protocol if needed.    MUSCULOSKELETAL: Ambulate as tolerated    Possible Downgrade to floor in PM  IMPRESSION:  Sepsis secondary to underlying pneumonia  Lactic acidosis  Hyperkalemia  SERVANDO on CKD3  ?Alcohol withdrawal  Hx throat Ca s/p chemo and radiotherapy    #Sepsis secondary to underlying PNA  #Lactic acidosis  - TTE  - Lactate 14.2 on admission -> 5.2  - Check CPK    #SERVANDO on CKD3  #Hyperkalemia  - CTA no hydro  - S/p lokelma, calcium gluconate, insulin, dexstrose  - Start on D5 HCO3    #History of alcohol use  #Alcohol withdrawal  - Ativan taper     #Hx of throat Ca s/p chemo and radiotherapy       CARDIOVASCULAR: Trend trops. F/u TTE    GI: GI prophylaxis. Pending S&S eval     RENAL: Trend Lactate. Lokelma 10mg now. Start bicarb drip. Follow up lytes. Correct as needed. F/u RBUS. F/u UA    ID: F/u cultures. On Rocephin and levaquin. F/u procal, MRSA. RVP negative    HEME: DVT prophylaxis with SC heparin    ENDOCRINE:  Follow up FS.  Insulin protocol if needed.    MUSCULOSKELETAL: Ambulate as tolerated    Possible Downgrade to floor in PM  IMPRESSION:  Sepsis secondary to underlying pneumonia  Lactic acidosis  Hyperkalemia  SERVANDO on CKD3  ?Alcohol withdrawal  Hx throat Ca s/p chemo and radiotherapy    #Sepsis secondary to underlying PNA  #Lactic acidosis  - TTE  - COVID/flu neg - pending full rvp, MRSA swab   - Lactate 14.2 on admission -> 5.2  - Check CPK  - start on rocephin and levaquin     #SERVANDO on CKD3  #Hyperkalemia  - CTA no hydro  - S/p lokelma, calcium gluconate, insulin, dexstrose  - Start on D5 HCO3    #History of alcohol use  #Alcohol withdrawal  - Ativan taper     #Hx of throat Ca s/p chemo and radiotherapy   - OP f/u     Misc:  DVT ppx: Heparin  GI ppx: PPI  Diet: Puree  Activity: IAT  Code: Full Code  Dispo: Floor if K stable

## 2024-06-04 LAB
ANION GAP SERPL CALC-SCNC: 11 MMOL/L — SIGNIFICANT CHANGE UP (ref 7–14)
BUN SERPL-MCNC: 37 MG/DL — HIGH (ref 10–20)
CALCIUM SERPL-MCNC: 8.5 MG/DL — SIGNIFICANT CHANGE UP (ref 8.4–10.5)
CHLORIDE SERPL-SCNC: 96 MMOL/L — LOW (ref 98–110)
CO2 SERPL-SCNC: 32 MMOL/L — SIGNIFICANT CHANGE UP (ref 17–32)
CREAT SERPL-MCNC: 2.2 MG/DL — HIGH (ref 0.7–1.5)
EGFR: 33 ML/MIN/1.73M2 — LOW
GLUCOSE SERPL-MCNC: 95 MG/DL — SIGNIFICANT CHANGE UP (ref 70–99)
HAPTOGLOB SERPL-MCNC: 175 MG/DL — SIGNIFICANT CHANGE UP (ref 34–200)
HCT VFR BLD CALC: 23.4 % — LOW (ref 42–52)
HGB BLD-MCNC: 7.9 G/DL — LOW (ref 14–18)
LDH SERPL L TO P-CCNC: 291 U/L — HIGH (ref 50–242)
MAGNESIUM SERPL-MCNC: 2 MG/DL — SIGNIFICANT CHANGE UP (ref 1.8–2.4)
MCHC RBC-ENTMCNC: 33.8 G/DL — SIGNIFICANT CHANGE UP (ref 32–37)
MCHC RBC-ENTMCNC: 34.3 PG — HIGH (ref 27–31)
MCV RBC AUTO: 101.7 FL — HIGH (ref 80–94)
MRSA PCR RESULT.: NEGATIVE — SIGNIFICANT CHANGE UP
NRBC # BLD: 0 /100 WBCS — SIGNIFICANT CHANGE UP (ref 0–0)
PHOSPHATE SERPL-MCNC: 3.7 MG/DL — SIGNIFICANT CHANGE UP (ref 2.1–4.9)
PLATELET # BLD AUTO: 216 K/UL — SIGNIFICANT CHANGE UP (ref 130–400)
PMV BLD: 10.1 FL — SIGNIFICANT CHANGE UP (ref 7.4–10.4)
POTASSIUM SERPL-MCNC: 3.7 MMOL/L — SIGNIFICANT CHANGE UP (ref 3.5–5)
POTASSIUM SERPL-SCNC: 3.7 MMOL/L — SIGNIFICANT CHANGE UP (ref 3.5–5)
PROCALCITONIN SERPL-MCNC: 27.6 NG/ML — HIGH (ref 0.02–0.1)
RAPID RVP RESULT: SIGNIFICANT CHANGE UP
RBC # BLD: 2.3 M/UL — LOW (ref 4.7–6.1)
RBC # BLD: 2.3 M/UL — LOW (ref 4.7–6.1)
RBC # FLD: 18.4 % — HIGH (ref 11.5–14.5)
RETICS #: 75 K/UL — SIGNIFICANT CHANGE UP (ref 25–125)
RETICS/RBC NFR: 3.3 % — HIGH (ref 0.5–1.5)
SARS-COV-2 RNA SPEC QL NAA+PROBE: SIGNIFICANT CHANGE UP
SODIUM SERPL-SCNC: 139 MMOL/L — SIGNIFICANT CHANGE UP (ref 135–146)
WBC # BLD: 14.83 K/UL — HIGH (ref 4.8–10.8)
WBC # FLD AUTO: 14.83 K/UL — HIGH (ref 4.8–10.8)

## 2024-06-04 PROCEDURE — 99233 SBSQ HOSP IP/OBS HIGH 50: CPT

## 2024-06-04 RX ORDER — SODIUM CHLORIDE 9 MG/ML
1000 INJECTION, SOLUTION INTRAVENOUS
Refills: 0 | Status: DISCONTINUED | OUTPATIENT
Start: 2024-06-04 | End: 2024-06-06

## 2024-06-04 RX ADMIN — SODIUM CHLORIDE 75 MILLILITER(S): 9 INJECTION, SOLUTION INTRAVENOUS at 11:01

## 2024-06-04 RX ADMIN — HEPARIN SODIUM 5000 UNIT(S): 5000 INJECTION INTRAVENOUS; SUBCUTANEOUS at 06:02

## 2024-06-04 RX ADMIN — Medication 1 MILLIGRAM(S): at 11:07

## 2024-06-04 RX ADMIN — Medication 1 PATCH: at 10:30

## 2024-06-04 RX ADMIN — Medication 2 MILLIGRAM(S): at 11:00

## 2024-06-04 RX ADMIN — PANTOPRAZOLE SODIUM 40 MILLIGRAM(S): 20 TABLET, DELAYED RELEASE ORAL at 06:02

## 2024-06-04 RX ADMIN — CEFTRIAXONE 100 MILLIGRAM(S): 500 INJECTION, POWDER, FOR SOLUTION INTRAMUSCULAR; INTRAVENOUS at 14:25

## 2024-06-04 RX ADMIN — Medication 100 MILLIGRAM(S): at 11:07

## 2024-06-04 RX ADMIN — Medication 1 PATCH: at 11:08

## 2024-06-04 RX ADMIN — Medication 2 MILLIGRAM(S): at 07:00

## 2024-06-04 RX ADMIN — Medication 1 PATCH: at 19:15

## 2024-06-04 RX ADMIN — HEPARIN SODIUM 5000 UNIT(S): 5000 INJECTION INTRAVENOUS; SUBCUTANEOUS at 21:02

## 2024-06-04 RX ADMIN — Medication 2 MILLIGRAM(S): at 01:37

## 2024-06-04 RX ADMIN — HEPARIN SODIUM 5000 UNIT(S): 5000 INJECTION INTRAVENOUS; SUBCUTANEOUS at 14:24

## 2024-06-04 NOTE — PROGRESS NOTE ADULT - ASSESSMENT
IMPRESSION:    Pneumonia possible aspiration   Recent hospital stay for ETOH withdrawal    SERVANDO with hyperkaliemia improving   HO ETOH use disorder  HO Throat cancer   AKA resolved     PLAN:    CNS: Thiamine and Folate CIWA protocol.  CATCH.  FU UDS SDS     HEENT: Oral care.  FU Speech and swallow.      PULMONARY:  HOB @ 45 degrees.  Aspiration precautions.  CXR noted.  Incenttive spirometry      CARDIOVASCULAR:  GDFR.  ECHO noted.  Avoid overload.  trend Lactate and CPK.  DC NaHCo3 drip for now.  LR at 75 cc per hour    GI: GI prophylaxis.  Feeding per speech.  Bowel regimen     RENAL:  Follow up lytes.  Correct as needed.  CTA with no hydro.  DC Lokelma.      INFECTIOUS DISEASE: Follow up cultures.  RVP neg.  Rocephin and Levaquin.  FU Nasal MRSA.  Urine Legionella and strep Ags      HEMATOLOGICAL:  DVT prophylaxis.  Dimer and Duplex noted.        ENDOCRINE:  Follow up FS.  Insulin protocol if needed.    MUSCULOSKELETAL:  OOB to chair.  PT OT     Downgrade to floor

## 2024-06-04 NOTE — CHART NOTE - NSCHARTNOTEFT_GEN_A_CORE
ICU Transfer Note    Transfer from: ICU  Transfer to: Medicine Floor    HPI / ICU COURSE:  64-year-old male with past medical history of EtOH use disorder, throat cancer status post chemotherapy and radiation last November presents to the emergency department from home for syncopal episode witnessed by family. Patient states he has been weak, dizzy, fatigued recently. Family was helping him to the bathroom when he had a syncopal episode but the family caught him so there was no head trauma or any other trauma.  Patient is not on anticoagulation.  EMS reported initial BP 74/52 and satting 84% on room air. EMS gave 300 cc NS bolus and had him on 6L NC.  Patient was recently discharged 2 days ago and he was admitted for alcohol withdrawal in the last admission and his last drink was 8 days ago and he feels like he is withdrawing. He has had withdrawal seizures before.    In ED, patient's BP was 88/53 s/p 2L LR with improvement  Labs show WBC 17K, K+ 6.5--->5.4, AG 28, Cr 2.7 (bl 1.5), Trops 88  pH 7.21-->7.12, Lactate 14.2-->5.2  CT scan showed patchy bilateral opacities concerning pneumonia  Admitted to ICU for Sepsis secondary to pneumonia    ICU Course:  During ICU course, he was treated with rocephin and levaquin for pneumonia. His lactic acidosis resolved with fluids. He was also treated for SERVANDO and hyperkalemia.   His CBC on 6/3 showed a Hgb a of 6.4 for which he was given a unit of pRBC       ASSESSMENT & PLAN:   #Sepsis secondary to underlying PNA  #Lactic acidosis, resolved   - TTE: 60-65% EF   - RVP neg, MRSA swab   - Lactate 14.2 on admission -> wnl now  - CK 1847   - start on rocephin and levaquin     #SERVANDO on CKD3  #Hyperkalemia, resolved   - CTA no hydro  - S/p lokelma, calcium gluconate, insulin, dextrose  - C/w with LR @75    #Acute anemia, requiring transfusion  - Hgb 6.4 -> s/p 1 u pRBC     #History of alcohol use  #Alcohol withdrawal  - Initially on ativan taper, however became too drowsy  - CIWA-triggered ativan     #Hx of throat Ca s/p chemo and radiotherapy   - OP f/u     Misc:  DVT ppx: Heparin  GI ppx: PPI  Diet: Puree  Activity: IAT  Code: Full Code     FOR FOLLOW UP:  [ ] PT  [ ] Resolution of SERVANDO  [ ] C/w Abx   [ ] Monitor Hgb s/p 1 uprbc ICU Transfer Note    Transfer from: ICU  Transfer to: Medicine Floor    HPI / ICU COURSE:  64-year-old male with past medical history of EtOH use disorder, throat cancer status post chemotherapy and radiation last November presents to the emergency department from home for syncopal episode witnessed by family. Patient states he has been weak, dizzy, fatigued recently. Family was helping him to the bathroom when he had a syncopal episode but the family caught him so there was no head trauma or any other trauma.  Patient is not on anticoagulation.  EMS reported initial BP 74/52 and satting 84% on room air. EMS gave 300 cc NS bolus and had him on 6L NC.  Patient was recently discharged 2 days ago and he was admitted for alcohol withdrawal in the last admission and his last drink was 8 days ago and he feels like he is withdrawing. He has had withdrawal seizures before.    In ED, patient's BP was 88/53 s/p 2L LR with improvement  Labs show WBC 17K, K+ 6.5--->5.4, AG 28, Cr 2.7 (bl 1.5), Trops 88  pH 7.21-->7.12, Lactate 14.2-->5.2  CT scan showed patchy bilateral opacities concerning pneumonia  Admitted to ICU for Sepsis secondary to pneumonia    ICU Course:  During ICU course, he was treated with rocephin and levaquin for pneumonia. His lactic acidosis resolved with fluids. He was also treated for SERVANDO and hyperkalemia.   His CBC on 6/3 showed a Hgb a of 6.4 for which he was given a unit of pRBC       ASSESSMENT & PLAN:   #Sepsis secondary to underlying PNA  #Lactic acidosis, resolved   - TTE: 60-65% EF   - RVP neg, MRSA swab   - Lactate 14.2 on admission -> wnl now  - CK 1847   - start on rocephin and levaquin     #SERVANDO on CKD3  #Hyperkalemia, resolved   - CTA no hydro  - S/p lokelma, calcium gluconate, insulin, dextrose  - C/w with LR @75    #Acute anemia, requiring transfusion  - Hgb 6.4 -> s/p 1 u pRBC     #History of alcohol use  #Alcohol withdrawal  - Initially on ativan taper, however became too drowsy  - CIWA-triggered ativan     #Hx of throat Ca s/p chemo and radiotherapy   - OP f/u     Misc:  DVT ppx: Heparin  GI ppx: PPI  Diet: Puree  Activity: IAT  Code: Full Code     FOR FOLLOW UP:  [ ] PT  [ ] Resolution of SERVANDO  [ ] C/w Abx   [ ] Monitor Hgb s/p 1 uprbc  [ ] Pending S&S eval (pt was too lethargic to participate yesterday)

## 2024-06-04 NOTE — SWALLOW BEDSIDE ASSESSMENT ADULT - NS SPL SWALLOW CLINIC TRIAL FT
+baseline cough, +min overt s/s of penetration/aspiration w/ thin liquids, +toleration of mildly thick, puree and soft and bite sized solids w/o overt s/s of penetration/aspiration
+wakes briefly to stim, difficulty maintaining arousal +accepted 1/2 tsp puree, +bolus holding and delayed swallow

## 2024-06-04 NOTE — PROGRESS NOTE ADULT - ASSESSMENT
anemia multifactirial   poor nutrition ,continous ongoing alcohal abuse  h/o throat cancer last petscan few nonths ago was free of recurrence   cnt present managment as per med team    karl blackman MD

## 2024-06-05 LAB
ALBUMIN SERPL ELPH-MCNC: 2.8 G/DL — LOW (ref 3.5–5.2)
ALP SERPL-CCNC: 80 U/L — SIGNIFICANT CHANGE UP (ref 30–115)
ALT FLD-CCNC: 47 U/L — HIGH (ref 0–41)
ANION GAP SERPL CALC-SCNC: 12 MMOL/L — SIGNIFICANT CHANGE UP (ref 7–14)
AST SERPL-CCNC: 53 U/L — HIGH (ref 0–41)
BASOPHILS # BLD AUTO: 0.04 K/UL — SIGNIFICANT CHANGE UP (ref 0–0.2)
BASOPHILS NFR BLD AUTO: 0.2 % — SIGNIFICANT CHANGE UP (ref 0–1)
BILIRUB SERPL-MCNC: 0.4 MG/DL — SIGNIFICANT CHANGE UP (ref 0.2–1.2)
BUN SERPL-MCNC: 25 MG/DL — HIGH (ref 10–20)
CALCIUM SERPL-MCNC: 8.4 MG/DL — SIGNIFICANT CHANGE UP (ref 8.4–10.4)
CHLORIDE SERPL-SCNC: 100 MMOL/L — SIGNIFICANT CHANGE UP (ref 98–110)
CO2 SERPL-SCNC: 27 MMOL/L — SIGNIFICANT CHANGE UP (ref 17–32)
CREAT SERPL-MCNC: 1.8 MG/DL — HIGH (ref 0.7–1.5)
EGFR: 42 ML/MIN/1.73M2 — LOW
EOSINOPHIL # BLD AUTO: 0.03 K/UL — SIGNIFICANT CHANGE UP (ref 0–0.7)
EOSINOPHIL NFR BLD AUTO: 0.2 % — SIGNIFICANT CHANGE UP (ref 0–8)
GLUCOSE SERPL-MCNC: 78 MG/DL — SIGNIFICANT CHANGE UP (ref 70–99)
HCT VFR BLD CALC: 24.5 % — LOW (ref 42–52)
HGB BLD-MCNC: 8.5 G/DL — LOW (ref 14–18)
IMM GRANULOCYTES NFR BLD AUTO: 1.9 % — HIGH (ref 0.1–0.3)
LYMPHOCYTES # BLD AUTO: 0.45 K/UL — LOW (ref 1.2–3.4)
LYMPHOCYTES # BLD AUTO: 2.6 % — LOW (ref 20.5–51.1)
MAGNESIUM SERPL-MCNC: 1.6 MG/DL — LOW (ref 1.8–2.4)
MCHC RBC-ENTMCNC: 34.3 PG — HIGH (ref 27–31)
MCHC RBC-ENTMCNC: 34.7 G/DL — SIGNIFICANT CHANGE UP (ref 32–37)
MCV RBC AUTO: 98.8 FL — HIGH (ref 80–94)
MONOCYTES # BLD AUTO: 0.98 K/UL — HIGH (ref 0.1–0.6)
MONOCYTES NFR BLD AUTO: 5.6 % — SIGNIFICANT CHANGE UP (ref 1.7–9.3)
NEUTROPHILS # BLD AUTO: 15.78 K/UL — HIGH (ref 1.4–6.5)
NEUTROPHILS NFR BLD AUTO: 89.5 % — HIGH (ref 42.2–75.2)
NRBC # BLD: 0 /100 WBCS — SIGNIFICANT CHANGE UP (ref 0–0)
PLATELET # BLD AUTO: 230 K/UL — SIGNIFICANT CHANGE UP (ref 130–400)
PMV BLD: 10.6 FL — HIGH (ref 7.4–10.4)
POTASSIUM SERPL-MCNC: 3.3 MMOL/L — LOW (ref 3.5–5)
POTASSIUM SERPL-SCNC: 3.3 MMOL/L — LOW (ref 3.5–5)
PROT SERPL-MCNC: 4.6 G/DL — LOW (ref 6–8)
RBC # BLD: 2.48 M/UL — LOW (ref 4.7–6.1)
RBC # FLD: 16.9 % — HIGH (ref 11.5–14.5)
SODIUM SERPL-SCNC: 139 MMOL/L — SIGNIFICANT CHANGE UP (ref 135–146)
WBC # BLD: 17.62 K/UL — HIGH (ref 4.8–10.8)
WBC # FLD AUTO: 17.62 K/UL — HIGH (ref 4.8–10.8)

## 2024-06-05 PROCEDURE — 99232 SBSQ HOSP IP/OBS MODERATE 35: CPT

## 2024-06-05 PROCEDURE — 71045 X-RAY EXAM CHEST 1 VIEW: CPT | Mod: 26

## 2024-06-05 PROCEDURE — 93010 ELECTROCARDIOGRAM REPORT: CPT

## 2024-06-05 RX ORDER — MAGNESIUM SULFATE 500 MG/ML
2 VIAL (ML) INJECTION ONCE
Refills: 0 | Status: COMPLETED | OUTPATIENT
Start: 2024-06-05 | End: 2024-06-05

## 2024-06-05 RX ORDER — POTASSIUM CHLORIDE 20 MEQ
40 PACKET (EA) ORAL ONCE
Refills: 0 | Status: COMPLETED | OUTPATIENT
Start: 2024-06-05 | End: 2024-06-05

## 2024-06-05 RX ORDER — AMLODIPINE BESYLATE 2.5 MG/1
5 TABLET ORAL ONCE
Refills: 0 | Status: COMPLETED | OUTPATIENT
Start: 2024-06-05 | End: 2024-06-05

## 2024-06-05 RX ADMIN — AMLODIPINE BESYLATE 5 MILLIGRAM(S): 2.5 TABLET ORAL at 16:35

## 2024-06-05 RX ADMIN — Medication 40 MILLIEQUIVALENT(S): at 11:38

## 2024-06-05 RX ADMIN — Medication 2 MILLIGRAM(S): at 17:01

## 2024-06-05 RX ADMIN — Medication 1 PATCH: at 11:43

## 2024-06-05 RX ADMIN — Medication 1 MILLIGRAM(S): at 11:38

## 2024-06-05 RX ADMIN — CEFTRIAXONE 100 MILLIGRAM(S): 500 INJECTION, POWDER, FOR SOLUTION INTRAMUSCULAR; INTRAVENOUS at 13:06

## 2024-06-05 RX ADMIN — Medication 1 PATCH: at 11:37

## 2024-06-05 RX ADMIN — Medication 25 GRAM(S): at 11:37

## 2024-06-05 RX ADMIN — HEPARIN SODIUM 5000 UNIT(S): 5000 INJECTION INTRAVENOUS; SUBCUTANEOUS at 05:43

## 2024-06-05 RX ADMIN — CHLORHEXIDINE GLUCONATE 1 APPLICATION(S): 213 SOLUTION TOPICAL at 11:43

## 2024-06-05 RX ADMIN — Medication 100 MILLIGRAM(S): at 11:38

## 2024-06-05 RX ADMIN — Medication 650 MILLIGRAM(S): at 06:15

## 2024-06-05 RX ADMIN — HEPARIN SODIUM 5000 UNIT(S): 5000 INJECTION INTRAVENOUS; SUBCUTANEOUS at 13:06

## 2024-06-05 RX ADMIN — Medication 1 PATCH: at 07:00

## 2024-06-05 RX ADMIN — Medication 650 MILLIGRAM(S): at 05:42

## 2024-06-05 RX ADMIN — PANTOPRAZOLE SODIUM 40 MILLIGRAM(S): 20 TABLET, DELAYED RELEASE ORAL at 05:42

## 2024-06-05 NOTE — PROGRESS NOTE ADULT - ASSESSMENT
64-year-old male with past medical history of EtOH use disorder, throat cancer status post chemotherapy and radiation last November presents to the emergency department from home for syncopal episode witnessed by family. Patient states he has been weak, dizzy, fatigued recently. Family was helping him to the bathroom when he had a syncopal episode but the family caught him so there was no head trauma or any other trauma.  Patient is not on anticoagulation.  EMS reported initial BP 74/52 and satting 84% on room air. EMS gave 300 cc NS bolus and had him on 6L NC.  Patient was recently discharged and he was admitted for alcohol withdrawal in the last admission and his last drink was 8 days ago and he feels like he is withdrawing.     In ED, patient's BP was 88/53 s/p 2L LR with improvement  Labs show WBC 17K, K+ 6.5--->5.4, AG 28, Cr 2.7 (bl 1.5), Trops 88  pH 7.21-->7.12, Lactate 14.2-->5.2  CT scan showed patchy bilateral opacities concerning pneumonia  Admitted to ICU for pneumonia and acidosis      # lactic acidosis sec to alcohol use-- resolved with fluids  # PNA-- is on levaquin and rocephin-- fever last night--  CXR looks better  continiue ABX-- culture pending    # hx of head and neck cancer  #alcohol withdrawal-- CIWA still high-- very unsteady-- wanted to sign AMA-- hx of alcohol withdrawal seizures  continue ativan pRN taper may start standing taper po if CIWA is higher.  # Anemia-- Hb 6.4 s/p 1 unit of PRBC-- now hb is 8.5 egd  EGD--< from: EGD-Colonoscopy (05.31.24 @ 08:30) >  Normal mucosa in the whole esophagus.    Small food residues (likely remnants of vegetables) were noted in the stomach.  The residues were irrigated and partially suctioned. .    Erythema in the stomach compatible with non-erosive gastritis. (Biopsy).    Normal mucosa in the whole examined duodenum. (Bio     Colonoscopy showed grade ! hemorrhoids    # SERVANDO-- creat 1.8 improving on iv fluids   Patient has no capacity and is trying to leave AMA-- very unsteady-- called wife multiple times and left messages-- no answer-- will not discharge him as he may go and fall outside or have a seizure.

## 2024-06-05 NOTE — PROGRESS NOTE ADULT - ASSESSMENT
64-year-old male with past medical history of EtOH use disorder, throat cancer status post chemotherapy and radiation last November presents to the emergency department from home for syncopal episode witnessed by family. Patient states he has been weak, dizzy, fatigued recently. Family was helping him to the bathroom when he had a syncopal episode but the family caught him so there was no head trauma or any other trauma.  Patient is not on anticoagulation.  EMS reported initial BP 74/52 and satting 84% on room air. EMS gave 300 cc NS bolus and had him on 6L NC.  Patient was recently discharged and he was admitted for alcohol withdrawal in the last admission and his last drink was 8 days ago and he feels like he is withdrawing. He has had withdrawal seizures before.    In ED, patient's BP was 88/53 s/p 2L LR with improvement  Labs show WBC 17K, K+ 6.5--->5.4, AG 28, Cr 2.7 (bl 1.5), Trops 88  pH 7.21-->7.12, Lactate 14.2-->5.2  CT scan showed patchy bilateral opacities concerning pneumonia  Admitted to ICU for Sepsis secondary to pneumonia    ICU Course:  During ICU course, he was treated with rocephin and levaquin for pneumonia. His lactic acidosis resolved with fluids. He was also treated for SERVANDO and hyperkalemia.   His CBC on 6/3 showed a Hgb a of 6.4 for which he was given a unit of pRBC     #Sepsis secondary to underlying PNA  #Lactic acidosis, resolved   - TTE: 60-65% EF   - RVP neg, MRSA swab   - Lactate 14.2 on admission -> wnl now  - CK 1847   - Continue rocephin and levaquin   - 6/5: spiked a fever this morning --> sent repeat blood cultures, CXR and UA     #SERVANDO on CKD3  #Hyperkalemia, resolved   - CTA no hydro  - S/p lokelma, calcium gluconate, insulin, dextrose  - C/w with LR @75    #Acute anemia likely multifactorial, requiring transfusion  - Hgb 6.4 -> s/p 1 u pRBC   - Hgb stable currently  - Keep active type and screen and monitor Hgb  - EGD May 31st did not show any active bleeding or varices, only gastritis. Colonoscopy with poor prep   - F/u Heme/Onc note     #History of alcohol use  #Alcohol withdrawal  - CIWA-triggered ativan     #Hx of throat Ca s/p chemo and radiotherapy   - OP f/u     #DVT ppx: Heparin

## 2024-06-05 NOTE — SWALLOW BEDSIDE ASSESSMENT ADULT - ADDITIONAL RECOMMENDATIONS
continue to suggest modified barium swallow study, pt refused at this time despite max encouragement and explanation of rationale.

## 2024-06-05 NOTE — SWALLOW BEDSIDE ASSESSMENT ADULT - SLP GENERAL OBSERVATIONS
Pt received in bed awake and alert on O2 NC
Pt received seated at edge of bed on room air fully awake, poor insight
Pt received in bed asleep, RN and Resident at b/s. +wakes briefly to stim

## 2024-06-05 NOTE — SWALLOW BEDSIDE ASSESSMENT ADULT - SPECIFY REASON(S)
dysphagia eval
pt was for planned VFSS, seen b/s prior to assess appropriateness this am
dysphagia eval

## 2024-06-05 NOTE — SWALLOW BEDSIDE ASSESSMENT ADULT - SWALLOW EVAL: DIAGNOSIS
Pt denies dysphagia and wants to be d/c'd home now
+baseline cough, +min overt s/s of penetration/aspiration w/ thin liquids, +toleration of mildly thick, puree and soft and bite sized solids w/o overt s/s of penetration/aspiration
+wakes briefly to stim, difficulty maintaining arousal +accepted 1/2 tsp puree, +bolus holding and delayed swallow

## 2024-06-05 NOTE — SWALLOW BEDSIDE ASSESSMENT ADULT - SWALLOW EVAL: RECOMMENDED DIET
continue soft and bite sized w/ thin liquids
soft and bite sized diet w/ thin liquids
NPO, okay for po meds only if significantly more awake and alert later in the day

## 2024-06-05 NOTE — CHART NOTE - NSCHARTNOTEFT_GEN_A_CORE
Patient will require a rolling walker at home due to their diagnosis of generalized weakness and deconditioning to help complete the MRADL's.    Patient will need rolling walker for at least 6 months.

## 2024-06-05 NOTE — SWALLOW BEDSIDE ASSESSMENT ADULT - SLP PERTINENT HISTORY OF CURRENT PROBLEM
64-year-old male with past medical history of EtOH use disorder, throat cancer status post chemotherapy and radiation last November presents to the emergency department from home for syncopal episode witnessed by family. Patient states he has been weak, dizzy, fatigued recently. Family was helping him to the bathroom when he had a syncopal episode but the family caught him so there was no head trauma or any other trauma. Patient was recently discharged 2 days ago and he was admitted for alcohol withdrawal in the last admission and his last drink was 8 days ago and he feels like he is withdrawing. He has had withdrawal seizures before.  CT scan showed patchy bilateral opacities concerning pneumonia. Admitted to ICU for Sepsis secondary to pneumonia. ?h/o dysphagia

## 2024-06-06 LAB
ALBUMIN SERPL ELPH-MCNC: 2.7 G/DL — LOW (ref 3.5–5.2)
ALP SERPL-CCNC: 75 U/L — SIGNIFICANT CHANGE UP (ref 30–115)
ALT FLD-CCNC: 40 U/L — SIGNIFICANT CHANGE UP (ref 0–41)
ANION GAP SERPL CALC-SCNC: 13 MMOL/L — SIGNIFICANT CHANGE UP (ref 7–14)
AST SERPL-CCNC: 34 U/L — SIGNIFICANT CHANGE UP (ref 0–41)
BASOPHILS # BLD AUTO: 0 K/UL — SIGNIFICANT CHANGE UP (ref 0–0.2)
BASOPHILS NFR BLD AUTO: 0 % — SIGNIFICANT CHANGE UP (ref 0–1)
BILIRUB SERPL-MCNC: 0.5 MG/DL — SIGNIFICANT CHANGE UP (ref 0.2–1.2)
BUN SERPL-MCNC: 21 MG/DL — HIGH (ref 10–20)
CALCIUM SERPL-MCNC: 8.7 MG/DL — SIGNIFICANT CHANGE UP (ref 8.4–10.4)
CHLORIDE SERPL-SCNC: 102 MMOL/L — SIGNIFICANT CHANGE UP (ref 98–110)
CO2 SERPL-SCNC: 25 MMOL/L — SIGNIFICANT CHANGE UP (ref 17–32)
CREAT SERPL-MCNC: 1.8 MG/DL — HIGH (ref 0.7–1.5)
EGFR: 42 ML/MIN/1.73M2 — LOW
EOSINOPHIL # BLD AUTO: 0.09 K/UL — SIGNIFICANT CHANGE UP (ref 0–0.7)
EOSINOPHIL NFR BLD AUTO: 0.9 % — SIGNIFICANT CHANGE UP (ref 0–8)
GLUCOSE SERPL-MCNC: 72 MG/DL — SIGNIFICANT CHANGE UP (ref 70–99)
HCT VFR BLD CALC: 25.2 % — LOW (ref 42–52)
HGB BLD-MCNC: 8.8 G/DL — LOW (ref 14–18)
LYMPHOCYTES # BLD AUTO: 0.42 K/UL — LOW (ref 1.2–3.4)
LYMPHOCYTES # BLD AUTO: 4.3 % — LOW (ref 20.5–51.1)
MAGNESIUM SERPL-MCNC: 1.7 MG/DL — LOW (ref 1.8–2.4)
MCHC RBC-ENTMCNC: 34.5 PG — HIGH (ref 27–31)
MCHC RBC-ENTMCNC: 34.9 G/DL — SIGNIFICANT CHANGE UP (ref 32–37)
MCV RBC AUTO: 98.8 FL — HIGH (ref 80–94)
MONOCYTES # BLD AUTO: 0.68 K/UL — HIGH (ref 0.1–0.6)
MONOCYTES NFR BLD AUTO: 7 % — SIGNIFICANT CHANGE UP (ref 1.7–9.3)
NEUTROPHILS # BLD AUTO: 8.26 K/UL — HIGH (ref 1.4–6.5)
NEUTROPHILS NFR BLD AUTO: 84.3 % — HIGH (ref 42.2–75.2)
PLATELET # BLD AUTO: 201 K/UL — SIGNIFICANT CHANGE UP (ref 130–400)
PMV BLD: 10.1 FL — SIGNIFICANT CHANGE UP (ref 7.4–10.4)
POTASSIUM SERPL-MCNC: 3.8 MMOL/L — SIGNIFICANT CHANGE UP (ref 3.5–5)
POTASSIUM SERPL-SCNC: 3.8 MMOL/L — SIGNIFICANT CHANGE UP (ref 3.5–5)
PROT SERPL-MCNC: 4.8 G/DL — LOW (ref 6–8)
RBC # BLD: 2.55 M/UL — LOW (ref 4.7–6.1)
RBC # FLD: 16.6 % — HIGH (ref 11.5–14.5)
SODIUM SERPL-SCNC: 140 MMOL/L — SIGNIFICANT CHANGE UP (ref 135–146)
WBC # BLD: 9.69 K/UL — SIGNIFICANT CHANGE UP (ref 4.8–10.8)
WBC # FLD AUTO: 9.69 K/UL — SIGNIFICANT CHANGE UP (ref 4.8–10.8)

## 2024-06-06 PROCEDURE — 99232 SBSQ HOSP IP/OBS MODERATE 35: CPT

## 2024-06-06 RX ORDER — MAGNESIUM SULFATE 500 MG/ML
2 VIAL (ML) INJECTION ONCE
Refills: 0 | Status: COMPLETED | OUTPATIENT
Start: 2024-06-06 | End: 2024-06-06

## 2024-06-06 RX ORDER — AMLODIPINE BESYLATE 2.5 MG/1
5 TABLET ORAL DAILY
Refills: 0 | Status: DISCONTINUED | OUTPATIENT
Start: 2024-06-06 | End: 2024-06-08

## 2024-06-06 RX ORDER — SODIUM CHLORIDE 9 MG/ML
1000 INJECTION INTRAMUSCULAR; INTRAVENOUS; SUBCUTANEOUS
Refills: 0 | Status: DISCONTINUED | OUTPATIENT
Start: 2024-06-06 | End: 2024-06-07

## 2024-06-06 RX ORDER — MORPHINE SULFATE 50 MG/1
10 CAPSULE, EXTENDED RELEASE ORAL ONCE
Refills: 0 | Status: DISCONTINUED | OUTPATIENT
Start: 2024-06-06 | End: 2024-06-06

## 2024-06-06 RX ORDER — CEFEPIME 1 G/1
2000 INJECTION, POWDER, FOR SOLUTION INTRAMUSCULAR; INTRAVENOUS ONCE
Refills: 0 | Status: COMPLETED | OUTPATIENT
Start: 2024-06-06 | End: 2024-06-06

## 2024-06-06 RX ORDER — CEFEPIME 1 G/1
2000 INJECTION, POWDER, FOR SOLUTION INTRAMUSCULAR; INTRAVENOUS EVERY 12 HOURS
Refills: 0 | Status: DISCONTINUED | OUTPATIENT
Start: 2024-06-06 | End: 2024-06-08

## 2024-06-06 RX ORDER — CEFEPIME 1 G/1
INJECTION, POWDER, FOR SOLUTION INTRAMUSCULAR; INTRAVENOUS
Refills: 0 | Status: DISCONTINUED | OUTPATIENT
Start: 2024-06-06 | End: 2024-06-08

## 2024-06-06 RX ADMIN — CEFEPIME 100 MILLIGRAM(S): 1 INJECTION, POWDER, FOR SOLUTION INTRAMUSCULAR; INTRAVENOUS at 17:40

## 2024-06-06 RX ADMIN — HEPARIN SODIUM 5000 UNIT(S): 5000 INJECTION INTRAVENOUS; SUBCUTANEOUS at 14:20

## 2024-06-06 RX ADMIN — AMLODIPINE BESYLATE 5 MILLIGRAM(S): 2.5 TABLET ORAL at 09:09

## 2024-06-06 RX ADMIN — Medication 650 MILLIGRAM(S): at 02:10

## 2024-06-06 RX ADMIN — Medication 1 PATCH: at 12:13

## 2024-06-06 RX ADMIN — Medication 1 PATCH: at 11:45

## 2024-06-06 RX ADMIN — CEFEPIME 100 MILLIGRAM(S): 1 INJECTION, POWDER, FOR SOLUTION INTRAMUSCULAR; INTRAVENOUS at 09:09

## 2024-06-06 RX ADMIN — Medication 2 MILLIGRAM(S): at 11:45

## 2024-06-06 RX ADMIN — Medication 1 PATCH: at 19:20

## 2024-06-06 RX ADMIN — Medication 1 MILLIGRAM(S): at 11:45

## 2024-06-06 RX ADMIN — HEPARIN SODIUM 5000 UNIT(S): 5000 INJECTION INTRAVENOUS; SUBCUTANEOUS at 05:16

## 2024-06-06 RX ADMIN — MORPHINE SULFATE 10 MILLIGRAM(S): 50 CAPSULE, EXTENDED RELEASE ORAL at 20:45

## 2024-06-06 RX ADMIN — SODIUM CHLORIDE 75 MILLILITER(S): 9 INJECTION INTRAMUSCULAR; INTRAVENOUS; SUBCUTANEOUS at 16:10

## 2024-06-06 RX ADMIN — MORPHINE SULFATE 10 MILLIGRAM(S): 50 CAPSULE, EXTENDED RELEASE ORAL at 20:16

## 2024-06-06 RX ADMIN — Medication 2 MILLIGRAM(S): at 23:04

## 2024-06-06 RX ADMIN — Medication 650 MILLIGRAM(S): at 01:40

## 2024-06-06 RX ADMIN — Medication 100 MILLIGRAM(S): at 11:45

## 2024-06-06 RX ADMIN — Medication 25 GRAM(S): at 09:09

## 2024-06-06 RX ADMIN — CHLORHEXIDINE GLUCONATE 1 APPLICATION(S): 213 SOLUTION TOPICAL at 11:45

## 2024-06-06 RX ADMIN — HEPARIN SODIUM 5000 UNIT(S): 5000 INJECTION INTRAVENOUS; SUBCUTANEOUS at 23:04

## 2024-06-06 RX ADMIN — Medication 1 PATCH: at 07:38

## 2024-06-06 RX ADMIN — PANTOPRAZOLE SODIUM 40 MILLIGRAM(S): 20 TABLET, DELAYED RELEASE ORAL at 05:16

## 2024-06-06 NOTE — PROGRESS NOTE ADULT - ASSESSMENT
64-year-old male with past medical history of EtOH use disorder, throat cancer status post chemotherapy and radiation last November presents to the emergency department from home for syncopal episode witnessed by family. Patient states he has been weak, dizzy, fatigued recently. Family was helping him to the bathroom when he had a syncopal episode but the family caught him so there was no head trauma or any other trauma.  Patient is not on anticoagulation.  EMS reported initial BP 74/52 and satting 84% on room air. EMS gave 300 cc NS bolus and had him on 6L NC. Patient was recently discharged and he was admitted for alcohol withdrawal in the last admission and his last drink was 8 days ago and he feels like he is withdrawing. He has had withdrawal seizures before.    In ED, patient's BP was 88/53 s/p 2L LR with improvement  Labs show WBC 17K, K+ 6.5--->5.4, AG 28, Cr 2.7 (bl 1.5), Trops 88  pH 7.21-->7.12, Lactate 14.2-->5.2  CT scan showed patchy bilateral opacities concerning pneumonia  Admitted to ICU for sepsis secondary to pneumonia, then downgraded to floors    ICU Course:  During ICU course, he was treated with rocephin and levaquin for pneumonia. His lactic acidosis resolved with fluids. He was also treated for SERVANDO and hyperkalemia.   His CBC on 6/3 showed a Hgb a of 6.4 for which he was given a unit of pRBC     #Sepsis secondary to underlying PNA  #Lactic acidosis, resolved   - TTE: 60-65% EF   - RVP neg  - Lactate 14.2 on admission -> wnl now  - CK 1847    - Blood cultures NGTD  - Spiking fevers  - Continue with levaquin, broadened ceftriaxone to cefepime    #SERVANDO on CKD3 - resolving   #Hyperkalemia, resolved   - CTA no hydro  - S/p lokelma, calcium gluconate, insulin, dextrose    #Acute anemia likely multifactorial, requiring transfusion  - Hgb 6.4 -> s/p 1 u pRBC   - Hgb stable currently  - Keep active type and screen and monitor Hgb  - EGD May 31st did not show any active bleeding or varices, only gastritis. Colonoscopy with poor prep     #History of alcohol use  #Alcohol withdrawal  #Agitation  - PO ativan taper - hold for drowsiness     #Hx of throat Ca s/p chemo and radiotherapy   - OP f/u     #HTN  - Started Amlodipine 5mg inpatient     #DVT ppx: Heparin

## 2024-06-06 NOTE — PROGRESS NOTE ADULT - ASSESSMENT
64-year-old male with past medical history of EtOH use disorder, throat cancer status post chemotherapy and radiation last November presents to the emergency department from home for syncopal episode witnessed by family. Patient states he has been weak, dizzy, fatigued recently. Family was helping him to the bathroom when he had a syncopal episode but the family caught him so there was no head trauma or any other trauma.  Patient is not on anticoagulation.  EMS reported initial BP 74/52 and satting 84% on room air. EMS gave 300 cc NS bolus and had him on 6L NC.  Patient was recently discharged and he was admitted for alcohol withdrawal in the last admission and his last drink was 8 days ago and he feels like he is withdrawing.     In ED, patient's BP was 88/53 s/p 2L LR with improvement  Labs show WBC 17K, K+ 6.5--->5.4, AG 28, Cr 2.7 (bl 1.5), Trops 88  pH 7.21-->7.12, Lactate 14.2-->5.2  CT scan showed patchy bilateral opacities concerning pneumonia  Admitted to ICU for pneumonia and acidosis      # lactic acidosis sec to alcohol use-- resolved with fluids  # PNA-- is on levaquin and rocephin-- Tmax last night--100.9-- rocephin changed to cefepime  CXR looks better  continue ABX-- culture negative    # hx of head and neck cancer  #alcohol withdrawal-- CIWA still high-- very unsteady-- wanted to sign AMA-- hx of alcohol withdrawal seizures  continue ativan low dose  taper may start standing taper po if CIWA is higher.    # Anemia-- Hb 6.4 s/p 1 unit of PRBC-- now hb is 8.5 egd  EGD--< from: EGD-Colonoscopy (05.31.24 @ 08:30) >  Normal mucosa in the whole esophagus.    Small food residues (likely remnants of vegetables) were noted in the stomach.  The residues were irrigated and partially suctioned. .    Erythema in the stomach compatible with non-erosive gastritis. (Biopsy).    Normal mucosa in the whole examined duodenum. (Bio     Colonoscopy showed grade ! hemorrhoids    # SERVANDO-- creat 1.8 improving on iv fluids   Patient is calm today    DC planning AM-- unable to find wife

## 2024-06-07 LAB
ALBUMIN SERPL ELPH-MCNC: 2.7 G/DL — LOW (ref 3.5–5.2)
ALP SERPL-CCNC: 66 U/L — SIGNIFICANT CHANGE UP (ref 30–115)
ALT FLD-CCNC: 33 U/L — SIGNIFICANT CHANGE UP (ref 0–41)
ANION GAP SERPL CALC-SCNC: 10 MMOL/L — SIGNIFICANT CHANGE UP (ref 7–14)
AST SERPL-CCNC: 23 U/L — SIGNIFICANT CHANGE UP (ref 0–41)
BASOPHILS # BLD AUTO: 0.06 K/UL — SIGNIFICANT CHANGE UP (ref 0–0.2)
BASOPHILS NFR BLD AUTO: 0.7 % — SIGNIFICANT CHANGE UP (ref 0–1)
BILIRUB SERPL-MCNC: 0.4 MG/DL — SIGNIFICANT CHANGE UP (ref 0.2–1.2)
BUN SERPL-MCNC: 18 MG/DL — SIGNIFICANT CHANGE UP (ref 10–20)
CALCIUM SERPL-MCNC: 8.3 MG/DL — LOW (ref 8.4–10.4)
CHLORIDE SERPL-SCNC: 104 MMOL/L — SIGNIFICANT CHANGE UP (ref 98–110)
CO2 SERPL-SCNC: 23 MMOL/L — SIGNIFICANT CHANGE UP (ref 17–32)
CREAT SERPL-MCNC: 1.5 MG/DL — SIGNIFICANT CHANGE UP (ref 0.7–1.5)
EGFR: 52 ML/MIN/1.73M2 — LOW
EOSINOPHIL # BLD AUTO: 0.08 K/UL — SIGNIFICANT CHANGE UP (ref 0–0.7)
EOSINOPHIL NFR BLD AUTO: 1 % — SIGNIFICANT CHANGE UP (ref 0–8)
GLUCOSE SERPL-MCNC: 79 MG/DL — SIGNIFICANT CHANGE UP (ref 70–99)
HCT VFR BLD CALC: 27.2 % — LOW (ref 42–52)
HGB BLD-MCNC: 9 G/DL — LOW (ref 14–18)
IMM GRANULOCYTES NFR BLD AUTO: 9.8 % — HIGH (ref 0.1–0.3)
LYMPHOCYTES # BLD AUTO: 0.44 K/UL — LOW (ref 1.2–3.4)
LYMPHOCYTES # BLD AUTO: 5.3 % — LOW (ref 20.5–51.1)
MAGNESIUM SERPL-MCNC: 1.8 MG/DL — SIGNIFICANT CHANGE UP (ref 1.8–2.4)
MCHC RBC-ENTMCNC: 33.1 G/DL — SIGNIFICANT CHANGE UP (ref 32–37)
MCHC RBC-ENTMCNC: 34 PG — HIGH (ref 27–31)
MCV RBC AUTO: 102.6 FL — HIGH (ref 80–94)
MONOCYTES # BLD AUTO: 1.08 K/UL — HIGH (ref 0.1–0.6)
MONOCYTES NFR BLD AUTO: 13.1 % — HIGH (ref 1.7–9.3)
NEUTROPHILS # BLD AUTO: 5.77 K/UL — SIGNIFICANT CHANGE UP (ref 1.4–6.5)
NEUTROPHILS NFR BLD AUTO: 70.1 % — SIGNIFICANT CHANGE UP (ref 42.2–75.2)
NRBC # BLD: 0 /100 WBCS — SIGNIFICANT CHANGE UP (ref 0–0)
PLATELET # BLD AUTO: 187 K/UL — SIGNIFICANT CHANGE UP (ref 130–400)
PMV BLD: 9.8 FL — SIGNIFICANT CHANGE UP (ref 7.4–10.4)
POTASSIUM SERPL-MCNC: 3.8 MMOL/L — SIGNIFICANT CHANGE UP (ref 3.5–5)
POTASSIUM SERPL-SCNC: 3.8 MMOL/L — SIGNIFICANT CHANGE UP (ref 3.5–5)
PROT SERPL-MCNC: 4.8 G/DL — LOW (ref 6–8)
RBC # BLD: 2.65 M/UL — LOW (ref 4.7–6.1)
RBC # FLD: 16.2 % — HIGH (ref 11.5–14.5)
SODIUM SERPL-SCNC: 137 MMOL/L — SIGNIFICANT CHANGE UP (ref 135–146)
WBC # BLD: 8.24 K/UL — SIGNIFICANT CHANGE UP (ref 4.8–10.8)
WBC # FLD AUTO: 8.24 K/UL — SIGNIFICANT CHANGE UP (ref 4.8–10.8)

## 2024-06-07 PROCEDURE — 99232 SBSQ HOSP IP/OBS MODERATE 35: CPT

## 2024-06-07 RX ORDER — TRAMADOL HYDROCHLORIDE 50 MG/1
25 TABLET ORAL ONCE
Refills: 0 | Status: DISCONTINUED | OUTPATIENT
Start: 2024-06-07 | End: 2024-06-07

## 2024-06-07 RX ADMIN — Medication 1 MILLIGRAM(S): at 05:27

## 2024-06-07 RX ADMIN — Medication 650 MILLIGRAM(S): at 22:36

## 2024-06-07 RX ADMIN — CEFEPIME 100 MILLIGRAM(S): 1 INJECTION, POWDER, FOR SOLUTION INTRAMUSCULAR; INTRAVENOUS at 17:25

## 2024-06-07 RX ADMIN — PANTOPRAZOLE SODIUM 40 MILLIGRAM(S): 20 TABLET, DELAYED RELEASE ORAL at 05:29

## 2024-06-07 RX ADMIN — AMLODIPINE BESYLATE 5 MILLIGRAM(S): 2.5 TABLET ORAL at 05:27

## 2024-06-07 RX ADMIN — Medication 1 MILLIGRAM(S): at 11:38

## 2024-06-07 RX ADMIN — Medication 100 MILLIGRAM(S): at 11:38

## 2024-06-07 RX ADMIN — Medication 1 PATCH: at 07:34

## 2024-06-07 RX ADMIN — Medication 1 PATCH: at 11:38

## 2024-06-07 RX ADMIN — CEFEPIME 100 MILLIGRAM(S): 1 INJECTION, POWDER, FOR SOLUTION INTRAMUSCULAR; INTRAVENOUS at 05:27

## 2024-06-07 RX ADMIN — TRAMADOL HYDROCHLORIDE 25 MILLIGRAM(S): 50 TABLET ORAL at 12:51

## 2024-06-07 RX ADMIN — Medication 650 MILLIGRAM(S): at 21:36

## 2024-06-07 RX ADMIN — HEPARIN SODIUM 5000 UNIT(S): 5000 INJECTION INTRAVENOUS; SUBCUTANEOUS at 05:28

## 2024-06-07 RX ADMIN — TRAMADOL HYDROCHLORIDE 25 MILLIGRAM(S): 50 TABLET ORAL at 12:23

## 2024-06-07 RX ADMIN — HEPARIN SODIUM 5000 UNIT(S): 5000 INJECTION INTRAVENOUS; SUBCUTANEOUS at 21:37

## 2024-06-07 RX ADMIN — Medication 1 PATCH: at 12:50

## 2024-06-07 RX ADMIN — Medication 1 PATCH: at 19:00

## 2024-06-07 NOTE — PROGRESS NOTE ADULT - ASSESSMENT
64-year-old male with past medical history of EtOH use disorder, throat cancer status post chemotherapy and radiation last November presents to the emergency department from home for syncopal episode witnessed by family. Patient states he has been weak, dizzy, fatigued recently. Family was helping him to the bathroom when he had a syncopal episode but the family caught him so there was no head trauma or any other trauma.  Patient is not on anticoagulation.  EMS reported initial BP 74/52 and satting 84% on room air. EMS gave 300 cc NS bolus and had him on 6L NC.  Patient was recently discharged and he was admitted for alcohol withdrawal in the last admission and his last drink was 8 days ago and he feels like he is withdrawing.     In ED, patient's BP was 88/53 s/p 2L LR with improvement  Labs show WBC 17K, K+ 6.5--->5.4, AG 28, Cr 2.7 (bl 1.5), Trops 88  pH 7.21-->7.12, Lactate 14.2-->5.2  CT scan showed patchy bilateral opacities concerning pneumonia  Admitted to ICU for pneumonia and acidosis      # lactic acidosis sec to alcohol use-- resolved with fluids  # PNA-- is on levaquin and rocephin-- Tmax last night--100.9-- rocephin changed to cefepime  CXR looks better  continue ABX-- culture negative    # hx of head and neck cancer  #alcohol withdrawal-- CIWA still high-- very unsteady-- wanted to sign AMA-- hx of alcohol withdrawal seizures   tapering ativan low dose  taper  patient very calm    # Anemia-- Hb 6.4 s/p 1 unit of PRBC-- now hb is 8.5 egd  EGD--< from: EGD-Colonoscopy (05.31.24 @ 08:30) >  Normal mucosa in the whole esophagus.    Small food residues (likely remnants of vegetables) were noted in the stomach.  The residues were irrigated and partially suctioned. .    Erythema in the stomach compatible with non-erosive gastritis. (Biopsy).    Normal mucosa in the whole examined duodenum. (Bio     Colonoscopy showed grade ! hemorrhoids    # SERVANDO-- creat 1.5 ---Dc iv fluids   Patient is calm today    DC planning AM--  wife is ready to take him home AM.

## 2024-06-07 NOTE — PROGRESS NOTE ADULT - ASSESSMENT
64-year-old male with past medical history of EtOH use disorder, throat cancer status post chemotherapy and radiation last November presents to the emergency department from home for syncopal episode witnessed by family. Patient states he has been weak, dizzy, fatigued recently. Family was helping him to the bathroom when he had a syncopal episode but the family caught him so there was no head trauma or any other trauma.  Patient is not on anticoagulation.  EMS reported initial BP 74/52 and satting 84% on room air. EMS gave 300 cc NS bolus and had him on 6L NC. Patient was recently discharged and he was admitted for alcohol withdrawal in the last admission and his last drink was 8 days ago and he feels like he is withdrawing. He has had withdrawal seizures before.    In ED, patient's BP was 88/53 s/p 2L LR with improvement  Labs show WBC 17K, K+ 6.5--->5.4, AG 28, Cr 2.7 (bl 1.5), Trops 88  pH 7.21-->7.12, Lactate 14.2-->5.2  CT scan showed patchy bilateral opacities concerning pneumonia  Admitted to ICU for sepsis secondary to pneumonia, then downgraded to floors    ICU Course:  During ICU course, he was treated with rocephin and levaquin for pneumonia. His lactic acidosis resolved with fluids. He was also treated for SERVANDO and hyperkalemia.   His CBC on 6/3 showed a Hgb a of 6.4 for which he was given a unit of pRBC     #Sepsis secondary to underlying PNA  #Lactic acidosis, resolved   - TTE: 60-65% EF   - RVP neg  - Lactate 14.2 on admission -> wnl now  - CK 1847    - Blood cultures NGTD  - Spiked fevers, no fevers in last 24hrs  - Continue with levaquin, broadened ceftriaxone to cefepime    #SERVANDO on CKD3 - resolving   #Hyperkalemia, resolved   - CTA no hydro  - S/p lokelma, calcium gluconate, insulin, dextrose    #Acute anemia likely multifactorial, requiring transfusion  - Hgb 6.4 -> s/p 1 u pRBC   - Hgb stable currently  - Keep active type and screen and monitor Hgb  - EGD May 31st did not show any active bleeding or varices, only gastritis. Colonoscopy with poor prep     #History of alcohol use  #Alcohol withdrawal  #Agitation  - PO ativan taper - hold for drowsiness     #Hx of throat Ca s/p chemo and radiotherapy   - OP f/u     #HTN  - Started Amlodipine 5mg inpatient   - Better controlled now     #DVT ppx: Heparin

## 2024-06-08 VITALS
HEART RATE: 74 BPM | RESPIRATION RATE: 21 BRPM | DIASTOLIC BLOOD PRESSURE: 65 MMHG | SYSTOLIC BLOOD PRESSURE: 122 MMHG | OXYGEN SATURATION: 98 % | TEMPERATURE: 98 F

## 2024-06-08 LAB
ALBUMIN SERPL ELPH-MCNC: 2.7 G/DL — LOW (ref 3.5–5.2)
ALP SERPL-CCNC: 64 U/L — SIGNIFICANT CHANGE UP (ref 30–115)
ALT FLD-CCNC: 32 U/L — SIGNIFICANT CHANGE UP (ref 0–41)
ANION GAP SERPL CALC-SCNC: 10 MMOL/L — SIGNIFICANT CHANGE UP (ref 7–14)
AST SERPL-CCNC: 23 U/L — SIGNIFICANT CHANGE UP (ref 0–41)
BASOPHILS # BLD AUTO: 0.07 K/UL — SIGNIFICANT CHANGE UP (ref 0–0.2)
BASOPHILS NFR BLD AUTO: 0.9 % — SIGNIFICANT CHANGE UP (ref 0–1)
BILIRUB SERPL-MCNC: 0.4 MG/DL — SIGNIFICANT CHANGE UP (ref 0.2–1.2)
BUN SERPL-MCNC: 19 MG/DL — SIGNIFICANT CHANGE UP (ref 10–20)
CALCIUM SERPL-MCNC: 8.3 MG/DL — LOW (ref 8.4–10.5)
CHLORIDE SERPL-SCNC: 103 MMOL/L — SIGNIFICANT CHANGE UP (ref 98–110)
CO2 SERPL-SCNC: 23 MMOL/L — SIGNIFICANT CHANGE UP (ref 17–32)
CREAT SERPL-MCNC: 1.5 MG/DL — SIGNIFICANT CHANGE UP (ref 0.7–1.5)
CULTURE RESULTS: SIGNIFICANT CHANGE UP
EGFR: 52 ML/MIN/1.73M2 — LOW
EOSINOPHIL # BLD AUTO: 0.07 K/UL — SIGNIFICANT CHANGE UP (ref 0–0.7)
EOSINOPHIL NFR BLD AUTO: 0.9 % — SIGNIFICANT CHANGE UP (ref 0–8)
GLUCOSE SERPL-MCNC: 87 MG/DL — SIGNIFICANT CHANGE UP (ref 70–99)
HCT VFR BLD CALC: 26.8 % — LOW (ref 42–52)
HGB BLD-MCNC: 9.1 G/DL — LOW (ref 14–18)
IMM GRANULOCYTES NFR BLD AUTO: 14.2 % — HIGH (ref 0.1–0.3)
LYMPHOCYTES # BLD AUTO: 0.47 K/UL — LOW (ref 1.2–3.4)
LYMPHOCYTES # BLD AUTO: 5.8 % — LOW (ref 20.5–51.1)
MAGNESIUM SERPL-MCNC: 1.6 MG/DL — LOW (ref 1.8–2.4)
MCHC RBC-ENTMCNC: 34 G/DL — SIGNIFICANT CHANGE UP (ref 32–37)
MCHC RBC-ENTMCNC: 34.1 PG — HIGH (ref 27–31)
MCV RBC AUTO: 100.4 FL — HIGH (ref 80–94)
MONOCYTES # BLD AUTO: 0.98 K/UL — HIGH (ref 0.1–0.6)
MONOCYTES NFR BLD AUTO: 12.1 % — HIGH (ref 1.7–9.3)
NEUTROPHILS # BLD AUTO: 5.38 K/UL — SIGNIFICANT CHANGE UP (ref 1.4–6.5)
NEUTROPHILS NFR BLD AUTO: 66.1 % — SIGNIFICANT CHANGE UP (ref 42.2–75.2)
NRBC # BLD: 0 /100 WBCS — SIGNIFICANT CHANGE UP (ref 0–0)
PLATELET # BLD AUTO: 209 K/UL — SIGNIFICANT CHANGE UP (ref 130–400)
PMV BLD: 10.2 FL — SIGNIFICANT CHANGE UP (ref 7.4–10.4)
POTASSIUM SERPL-MCNC: 3.9 MMOL/L — SIGNIFICANT CHANGE UP (ref 3.5–5)
POTASSIUM SERPL-SCNC: 3.9 MMOL/L — SIGNIFICANT CHANGE UP (ref 3.5–5)
PROT SERPL-MCNC: 5.1 G/DL — LOW (ref 6–8)
RBC # BLD: 2.67 M/UL — LOW (ref 4.7–6.1)
RBC # FLD: 16.2 % — HIGH (ref 11.5–14.5)
SODIUM SERPL-SCNC: 136 MMOL/L — SIGNIFICANT CHANGE UP (ref 135–146)
SPECIMEN SOURCE: SIGNIFICANT CHANGE UP
WBC # BLD: 8.12 K/UL — SIGNIFICANT CHANGE UP (ref 4.8–10.8)
WBC # FLD AUTO: 8.12 K/UL — SIGNIFICANT CHANGE UP (ref 4.8–10.8)

## 2024-06-08 PROCEDURE — 99239 HOSP IP/OBS DSCHRG MGMT >30: CPT

## 2024-06-08 RX ORDER — CEFPODOXIME PROXETIL 100 MG
1 TABLET ORAL
Qty: 10 | Refills: 0
Start: 2024-06-08 | End: 2024-06-12

## 2024-06-08 RX ORDER — MAGNESIUM SULFATE 500 MG/ML
2 VIAL (ML) INJECTION ONCE
Refills: 0 | Status: COMPLETED | OUTPATIENT
Start: 2024-06-08 | End: 2024-06-08

## 2024-06-08 RX ADMIN — Medication 650 MILLIGRAM(S): at 06:04

## 2024-06-08 RX ADMIN — PANTOPRAZOLE SODIUM 40 MILLIGRAM(S): 20 TABLET, DELAYED RELEASE ORAL at 06:20

## 2024-06-08 RX ADMIN — Medication 1 PATCH: at 12:07

## 2024-06-08 RX ADMIN — Medication 100 MILLIGRAM(S): at 12:08

## 2024-06-08 RX ADMIN — Medication 0.5 MILLIGRAM(S): at 13:37

## 2024-06-08 RX ADMIN — Medication 1 PATCH: at 07:43

## 2024-06-08 RX ADMIN — HEPARIN SODIUM 5000 UNIT(S): 5000 INJECTION INTRAVENOUS; SUBCUTANEOUS at 06:05

## 2024-06-08 RX ADMIN — Medication 1 PATCH: at 12:09

## 2024-06-08 RX ADMIN — Medication 1 MILLIGRAM(S): at 12:08

## 2024-06-08 RX ADMIN — CEFEPIME 100 MILLIGRAM(S): 1 INJECTION, POWDER, FOR SOLUTION INTRAMUSCULAR; INTRAVENOUS at 06:03

## 2024-06-08 RX ADMIN — Medication 0.5 MILLIGRAM(S): at 09:22

## 2024-06-08 RX ADMIN — Medication 25 GRAM(S): at 12:09

## 2024-06-08 RX ADMIN — HEPARIN SODIUM 5000 UNIT(S): 5000 INJECTION INTRAVENOUS; SUBCUTANEOUS at 13:38

## 2024-06-08 RX ADMIN — AMLODIPINE BESYLATE 5 MILLIGRAM(S): 2.5 TABLET ORAL at 06:20

## 2024-06-08 NOTE — DISCHARGE NOTE NURSING/CASE MANAGEMENT/SOCIAL WORK - PATIENT PORTAL LINK FT
You can access the FollowMyHealth Patient Portal offered by [facility] by registering at the following website: [email or web address]. By joining [employer]’s FollowMyHealth portal, you will also be able to view your health information using other applications (apps) compatible with our system.

## 2024-06-08 NOTE — DISCHARGE NOTE PROVIDER - NSDCCPCAREPLAN_GEN_ALL_CORE_FT
PRINCIPAL DISCHARGE DIAGNOSIS  Diagnosis: Pneumonia  Assessment and Plan of Treatment: Please take your medications as directed. Don’t skip doses. Follow up with your primary care physician within 3 days. Continue taking your antibiotics as directed until they are all gone—even if you start to feel better. This will prevent the pneumonia from reoccuring. Coughing up mucus is normal. Don’t use medicines to suppress your cough unless your cough is dry, painful, or interferes with your sleep. Get plenty of rest until your fever, shortness of breath, and chest pain go away. Plan to get a flu shot every year. Ask your primary care doctor about pneumonia vaccines.  Seek immediate medical attention if you experience chest pain, trouble breathing, blue lips or fingernails, fever of 100.4°F  (38°C) or higher, yellow, green, bloody, or smelly sputum, more than normal mucus production, vomiting or diarrhea.        SECONDARY DISCHARGE DIAGNOSES  Diagnosis: Alcoholic ketoacidosis  Assessment and Plan of Treatment:     Diagnosis: Sepsis due to pneumonia  Assessment and Plan of Treatment:     Diagnosis: Hyperkalemia  Assessment and Plan of Treatment:     Diagnosis: Elevated troponin  Assessment and Plan of Treatment:

## 2024-06-08 NOTE — DISCHARGE NOTE PROVIDER - CARE PROVIDER_API CALL
Stacy Walls  Internal Medicine  [address] [place] [address]  Phone: [phone]  Fax: [phone]  Follow Up Time: 1 week

## 2024-06-08 NOTE — DISCHARGE NOTE PROVIDER - HOSPITAL COURSE
64-year-old male with past medical history of EtOH use disorder, throat cancer status post chemotherapy and radiation last November presents to the emergency department from home for syncopal episode witnessed by family. Patient states he has been weak, dizzy, fatigued recently. Family was helping him to the bathroom when he had a syncopal episode but the family caught him so there was no head trauma or any other trauma.  Patient is not on anticoagulation.  EMS reported initial BP 74/52 and satting 84% on room air. EMS gave 300 cc NS bolus and had him on 6L NC. Patient was recently discharged and he was admitted for alcohol withdrawal in the last admission and his last drink was 8 days ago and he feels like he is withdrawing. He has had withdrawal seizures before.    In ED, patient's BP was 88/53 s/p 2L LR with improvement  Labs show WBC 17K, K+ 6.5--->5.4, AG 28, Cr 2.7 (bl 1.5), Trops 88  pH 7.21-->7.12, Lactate 14.2-->5.2  CT scan showed patchy bilateral opacities concerning pneumonia  Admitted to ICU for sepsis secondary to pneumonia, then downgraded to floors    ICU Course:  During ICU course, he was treated with rocephin and levaquin for pneumonia. His lactic acidosis resolved with fluids. He was also treated for SERVANDO and hyperkalemia.   His CBC on 6/3 showed a Hgb a of 6.4 for which he was given a unit of pRBC     Floor course:  Patient continued on IV antibiotics and is now stable for discharge.    #Sepsis secondary to underlying PNA  #Lactic acidosis, resolved   - TTE: 60-65% EF   - RVP neg  - Lactate 14.2 on admission -> wnl now  - CK 1847    - Blood cultures NGTD  - S/p IV levaquin and cefepime     #SERVANDO on CKD3 - resolving   #Hyperkalemia, resolved   - CTA no hydro  - S/p lokelma, calcium gluconate, insulin, dextrose    #Acute anemia likely multifactorial, requiring transfusion  - Hgb 6.4 -> s/p 1 u pRBC --> now stable   - EGD May 31st did not show any active bleeding or varices, only gastritis. Colonoscopy with poor prep     #History of alcohol use  #Alcohol withdrawal  #Agitation  - S/p ativan taper     #Hx of throat Ca s/p chemo and radiotherapy   - OP f/u     #HTN  - Started Amlodipine 5mg inpatient   - Better controlled now    64-year-old male with past medical history of EtOH use disorder, throat cancer status post chemotherapy and radiation last November presents to the emergency department from home for syncopal episode witnessed by family. Patient states he has been weak, dizzy, fatigued recently. Family was helping him to the bathroom when he had a syncopal episode but the family caught him so there was no head trauma or any other trauma.  Patient is not on anticoagulation.  EMS reported initial BP 74/52 and satting 84% on room air. EMS gave 300 cc NS bolus and had him on 6L NC. Patient was recently discharged and he was admitted for alcohol withdrawal in the last admission and his last drink was 8 days ago and he feels like he is withdrawing. He has had withdrawal seizures before.    In ED, patient's BP was 88/53 s/p 2L LR with improvement  Labs show WBC 17K, K+ 6.5--->5.4, AG 28, Cr 2.7 (bl 1.5), Trops 88  pH 7.21-->7.12, Lactate 14.2-->5.2  CT scan showed patchy bilateral opacities concerning pneumonia  Admitted to ICU for sepsis secondary to pneumonia, then downgraded to floors    ICU Course:  During ICU course, he was treated with rocephin and levaquin for pneumonia. His lactic acidosis resolved with fluids. He was also treated for SERVANDO and hyperkalemia.   His CBC on 6/3 showed a Hgb a of 6.4 for which he was given a unit of pRBC     Floor course:  Patient continued on IV antibiotics and is now stable for discharge.    #Sepsis secondary to underlying PNA  #Lactic acidosis, resolved   - TTE: 60-65% EF   - RVP neg  - Lactate 14.2 on admission -> wnl now  - CK 1847    - Blood cultures NGTD  - S/p IV levaquin and cefepime   - Sending on home course of cefpodoxime PO to complete abx course     #SERVANDO on CKD3 - resolving   #Hyperkalemia, resolved   - CTA no hydro  - S/p lokelma, calcium gluconate, insulin, dextrose    #Acute anemia likely multifactorial, requiring transfusion  - Hgb 6.4 -> s/p 1 u pRBC --> now stable   - EGD May 31st did not show any active bleeding or varices, only gastritis. Colonoscopy with poor prep     #History of alcohol use  #Alcohol withdrawal  #Agitation  - S/p ativan taper     #Hx of throat Ca s/p chemo and radiotherapy   - OP f/u

## 2024-06-08 NOTE — PROGRESS NOTE ADULT - ASSESSMENT
64-year-old male with past medical history of EtOH use disorder, throat cancer status post chemotherapy and radiation last November presents to the emergency department from home for syncopal episode witnessed by family. Patient states he has been weak, dizzy, fatigued recently. Family was helping him to the bathroom when he had a syncopal episode but the family caught him so there was no head trauma or any other trauma.  Patient is not on anticoagulation.  EMS reported initial BP 74/52 and satting 84% on room air. EMS gave 300 cc NS bolus and had him on 6L NC. Patient was recently discharged and he was admitted for alcohol withdrawal in the last admission and his last drink was 8 days ago and he feels like he is withdrawing. He has had withdrawal seizures before.    In ED, patient's BP was 88/53 s/p 2L LR with improvement  Labs show WBC 17K, K+ 6.5--->5.4, AG 28, Cr 2.7 (bl 1.5), Trops 88  pH 7.21-->7.12, Lactate 14.2-->5.2  CT scan showed patchy bilateral opacities concerning pneumonia  Admitted to ICU for sepsis secondary to pneumonia, then downgraded to floors    ICU Course:  During ICU course, he was treated with rocephin and levaquin for pneumonia. His lactic acidosis resolved with fluids. He was also treated for SERVANDO and hyperkalemia.   His CBC on 6/3 showed a Hgb a of 6.4 for which he was given a unit of pRBC     #Sepsis secondary to underlying PNA  #Lactic acidosis, resolved   - TTE: 60-65% EF   - RVP neg  - Lactate 14.2 on admission -> wnl now  - CK 1847    - Blood cultures NGTD  - Spiked fevers, no fevers in last 24hrs  - Continue with levaquin, broadened ceftriaxone to cefepime    #SERVANDO on CKD3 - resolving   #Hyperkalemia, resolved   - CTA no hydro  - S/p lokelma, calcium gluconate, insulin, dextrose    #Acute anemia likely multifactorial, requiring transfusion  - Hgb 6.4 -> s/p 1 u pRBC   - Hgb stable currently  - Keep active type and screen and monitor Hgb  - EGD May 31st did not show any active bleeding or varices, only gastritis. Colonoscopy with poor prep     #History of alcohol use  #Alcohol withdrawal  #Agitation  - PO ativan taper - hold for drowsiness     #Hx of throat Ca s/p chemo and radiotherapy   - OP f/u     #HTN  - Started Amlodipine 5mg inpatient   - Better controlled now     #DVT ppx: Heparin   64-year-old male with past medical history of EtOH use disorder, throat cancer status post chemotherapy and radiation last November presents to the emergency department from home for syncopal episode witnessed by family. Patient states he has been weak, dizzy, fatigued recently. Family was helping him to the bathroom when he had a syncopal episode but the family caught him so there was no head trauma or any other trauma.  Patient is not on anticoagulation.  EMS reported initial BP 74/52 and satting 84% on room air. EMS gave 300 cc NS bolus and had him on 6L NC. Patient was recently discharged and he was admitted for alcohol withdrawal in the last admission and his last drink was 8 days ago and he feels like he is withdrawing. He has had withdrawal seizures before.    In ED, patient's BP was 88/53 s/p 2L LR with improvement  Labs show WBC 17K, K+ 6.5--->5.4, AG 28, Cr 2.7 (bl 1.5), Trops 88  pH 7.21-->7.12, Lactate 14.2-->5.2  CT scan showed patchy bilateral opacities concerning pneumonia  Admitted to ICU for sepsis secondary to pneumonia, then downgraded to floors    ICU Course:  During ICU course, he was treated with rocephin and levaquin for pneumonia. His lactic acidosis resolved with fluids. He was also treated for SERVANDO and hyperkalemia.   His CBC on 6/3 showed a Hgb a of 6.4 for which he was given a unit of pRBC     #Sepsis secondary to underlying PNA  #Lactic acidosis, resolved   - TTE: 60-65% EF   - RVP neg  - Lactate 14.2 on admission -> wnl now  - CK 1847    - Blood cultures NGTD  - Spiked fevers, no fevers in last 24hrs  - s/p levaquin and cefepime. dc on augmentin     #SERVANDO on CKD3 - resolving   #Hyperkalemia, resolved   - CTA no hydro  - S/p lokelma, calcium gluconate, insulin, dextrose    #Acute anemia likely multifactorial, requiring transfusion  - Hgb 6.4 -> s/p 1 u pRBC   - Hgb stable currently  - Keep active type and screen and monitor Hgb  - EGD May 31st did not show any active bleeding or varices, only gastritis. Colonoscopy with poor prep     #History of alcohol use  #Alcohol withdrawal  #Agitation  - PO ativan taper - hold for drowsiness     #Hx of throat Ca s/p chemo and radiotherapy   - OP f/u     #HTN  - Started Amlodipine 5mg inpatient   - Better controlled now     #DVT ppx: Heparin

## 2024-06-08 NOTE — DISCHARGE NOTE PROVIDER - NSDCMRMEDTOKEN_GEN_ALL_CORE_FT
acetaminophen 325 mg oral tablet: 2 tab(s) orally every 6 hours As needed Temp greater or equal to 38C (100.4F), Mild Pain (1 - 3)  aluminum hydroxide-magnesium hydroxide 200 mg-200 mg/5 mL oral suspension: 30 milliliter(s) orally every 4 hours as needed for Dyspepsia  folic acid 1 mg oral tablet: 1 tab(s) orally once a day  Hurricaine 20% mucous membrane spray: Apply topically to affected area 10 times a day  hydrOXYzine hydrochloride 25 mg oral tablet: 1 tab(s) orally 2 times a day as needed for Anxiety  melatonin 3 mg oral tablet: 1 tab(s) orally once a day (at bedtime) as needed for Insomnia  morphine 20 mg/mL oral concentrate: 0.5 milliliter(s) orally every 4 hours as needed for Moderate Pain (4 - 6) MDD: 3mL  Multiple Vitamins oral tablet: 1 tab(s) orally once a day  nicotine 21 mg/24 hr transdermal film, extended release: 1 patch transdermal once a day  pantoprazole 40 mg oral delayed release tablet: 1 tab(s) orally once a day (before a meal)  polyethylene glycol 3350 oral powder for reconstitution: 17 gram(s) orally 2 times a day  senna leaf extract oral tablet: 2 tab(s) orally once a day (at bedtime)  thiamine 100 mg oral tablet: 1 tab(s) orally once a day   acetaminophen 325 mg oral tablet: 2 tab(s) orally every 6 hours As needed Temp greater or equal to 38C (100.4F), Mild Pain (1 - 3)  aluminum hydroxide-magnesium hydroxide 200 mg-200 mg/5 mL oral suspension: 30 milliliter(s) orally every 4 hours as needed for Dyspepsia  cefpodoxime 200 mg oral tablet: 1 tab(s) orally 2 times a day  folic acid 1 mg oral tablet: 1 tab(s) orally once a day  Hurricaine 20% mucous membrane spray: Apply topically to affected area 10 times a day  hydrOXYzine hydrochloride 25 mg oral tablet: 1 tab(s) orally 2 times a day as needed for Anxiety  melatonin 3 mg oral tablet: 1 tab(s) orally once a day (at bedtime) as needed for Insomnia  morphine 20 mg/mL oral concentrate: 0.5 milliliter(s) orally every 4 hours as needed for Moderate Pain (4 - 6) MDD: 3mL  Multiple Vitamins oral tablet: 1 tab(s) orally once a day  nicotine 21 mg/24 hr transdermal film, extended release: 1 patch transdermal once a day  pantoprazole 40 mg oral delayed release tablet: 1 tab(s) orally once a day (before a meal)  polyethylene glycol 3350 oral powder for reconstitution: 17 gram(s) orally 2 times a day  senna leaf extract oral tablet: 2 tab(s) orally once a day (at bedtime)  thiamine 100 mg oral tablet: 1 tab(s) orally once a day

## 2024-06-08 NOTE — DISCHARGE NOTE NURSING/CASE MANAGEMENT/SOCIAL WORK - NSDCPEFALRISK_GEN_ALL_CORE
For information on Fall & Injury Prevention, visit: [email or web address] OR  [email or web address] OR  https://www.cdc.gov/steadi/patient.html

## 2024-06-08 NOTE — PROGRESS NOTE ADULT - PROVIDER SPECIALTY LIST ADULT
Internal Medicine
Critical Care
Hospitalist
Hospitalist
Internal Medicine
Hospitalist
Internal Medicine
Internal Medicine
MICU
Heme/Onc
Hospitalist

## 2024-06-08 NOTE — DISCHARGE NOTE NURSING/CASE MANAGEMENT/SOCIAL WORK - NSDCPEWEB_GEN_ALL_CORE
[facility] for Tobacco Control website --- [email or web address] website --- [email or web address]

## 2024-06-08 NOTE — PROGRESS NOTE ADULT - ASSESSMENT
64-year-old male with past medical history of EtOH use disorder, throat cancer status post chemotherapy and radiation last November presents to the emergency department from home for syncopal episode witnessed by family. Patient states he has been weak, dizzy, fatigued recently. Family was helping him to the bathroom when he had a syncopal episode but the family caught him so there was no head trauma or any other trauma.  Patient is not on anticoagulation.  EMS reported initial BP 74/52 and satting 84% on room air. EMS gave 300 cc NS bolus and had him on 6L NC.  Patient was recently discharged and he was admitted for alcohol withdrawal in the last admission and his last drink was 8 days ago and he feels like he is withdrawing.     In ED, patient's BP was 88/53 s/p 2L LR with improvement  Labs show WBC 17K, K+ 6.5--->5.4, AG 28, Cr 2.7 (bl 1.5), Trops 88  pH 7.21-->7.12, Lactate 14.2-->5.2  CT scan showed patchy bilateral opacities concerning pneumonia  Admitted to ICU for pneumonia and acidosis      # lactic acidosis sec to alcohol use-- resolved with fluids  # PNA-- is on levaquin and rocephin--  no fever- rocephin changed to cefepime  CXR improved  continue ABX-- culture negative-- will do po defpodoxime    # hx of head and neck cancer  #alcohol withdrawal-- -- patient is fully alert-- no tremors    # Anemia-- Hb 6.4 s/p 1 unit of PRBC-- now hb is 9.1  EGD--< from: EGD-Colonoscopy (05.31.24 @ 08:30) >  Normal mucosa in the whole esophagus.    Small food residues (likely remnants of vegetables) were noted in the stomach.  The residues were irrigated and partially suctioned. .    Erythema in the stomach compatible with non-erosive gastritis. (Biopsy).    Normal mucosa in the whole examined duodenum. (Bio     Colonoscopy showed grade 1 hemorrhoids    # SERVANDO-- creat 1.5 -- off fluids  # hypomagnesemia-- repleted     DC plan today-- spent more than 30mins-- wife was called and she is ready to receive him home.

## 2024-06-08 NOTE — PROGRESS NOTE ADULT - SUBJECTIVE AND OBJECTIVE BOX
24H events:    Patient is a 64y old Male who presents with a chief complaint of Sepsis (04 Jun 2024 15:47)    Primary diagnosis of Alcohol dependence with withdrawal    Today is hospital day 2d. This morning patient was seen and examined at bedside, resting comfortably in bed.    No acute or major events overnight.   Patient attempting to leave AMA, but does not have capacity at this time. Attempted to contact wife, Ashley, via phone twice but she did not . I left a voicemail explaining the situation and requested her callback.     Family communication:  Contact date:  Name of person contacted:  Relationship to patient:  Communication details:  What matters most:    PAST MEDICAL & SURGICAL HISTORY  Throat cancer    HTN (hypertension)    History of alcohol use disorder      SOCIAL HISTORY:  Social History:      ALLERGIES:  No Known Allergies    MEDICATIONS:  STANDING MEDICATIONS  cefTRIAXone   IVPB 1000 milliGRAM(s) IV Intermittent every 24 hours  chlorhexidine 4% Liquid 1 Application(s) Topical daily  folic acid 1 milliGRAM(s) Oral daily  heparin   Injectable 5000 Unit(s) SubCutaneous every 8 hours  lactated ringers. 1000 milliLiter(s) IV Continuous <Continuous>  levoFLOXacin IVPB 750 milliGRAM(s) IV Intermittent every 48 hours  magnesium sulfate  IVPB 2 Gram(s) IV Intermittent once  nicotine - 21 mG/24Hr(s) Patch 1 Patch Transdermal daily  pantoprazole    Tablet 40 milliGRAM(s) Oral before breakfast  potassium chloride    Tablet ER 40 milliEquivalent(s) Oral once  thiamine 100 milliGRAM(s) Oral daily    PRN MEDICATIONS  acetaminophen     Tablet .. 650 milliGRAM(s) Oral every 6 hours PRN  LORazepam   Injectable 2 milliGRAM(s) IV Push every 2 hours PRN  polyethylene glycol 3350 17 Gram(s) Oral two times a day PRN  senna 2 Tablet(s) Oral at bedtime PRN    VITALS:   T(F): 99.9  HR: 67  BP: 163/66  RR: 18  SpO2: 97%    PHYSICAL EXAM:  Constitutional; No acute distress. Muscle atrophy   Head: Atraumatic, normocephalic  Lungs: bilateral crackles  Heart: S1, S2+; no murmurs  Abd: Soft non tender non distended  Ext: no pedal edema  Neuro: AOX3  Skin: No rashes or lesions    LABS:                        8.5    17.62 )-----------( 230      ( 05 Jun 2024 06:56 )             24.5     06-05    139  |  100  |  25<H>  ----------------------------<  78  3.3<L>   |  27  |  1.8<H>    Ca    8.4      05 Jun 2024 06:56  Phos  3.7     06-04  Mg     1.6     06-05    TPro  4.6<L>  /  Alb  2.8<L>  /  TBili  0.4  /  DBili  x   /  AST  53<H>  /  ALT  47<H>  /  AlkPhos  80  06-05      Urinalysis Basic - ( 05 Jun 2024 06:56 )    Color: x / Appearance: x / SG: x / pH: x  Gluc: 78 mg/dL / Ketone: x  / Bili: x / Urobili: x   Blood: x / Protein: x / Nitrite: x   Leuk Esterase: x / RBC: x / WBC x   Sq Epi: x / Non Sq Epi: x / Bacteria: x      Culture - Blood (collected 03 Jun 2024 12:00)  Source: .Blood None  Preliminary Report (05 Jun 2024 01:03):    No growth at 24 hours    Culture - Blood (collected 03 Jun 2024 00:30)  Source: .Blood Blood-Peripheral  Preliminary Report (05 Jun 2024 10:02):    No growth at 48 Hours    Culture - Blood (collected 03 Jun 2024 00:30)  Source: .Blood Blood-Peripheral  Preliminary Report (05 Jun 2024 10:02):    No growth at 48 Hours      CARDIAC MARKERS ( 03 Jun 2024 12:00 )  x     / x     / 1847 U/L / x     / x          
24H events:    Patient is a 64y old Male who presents with a chief complaint of Sepsis (05 Jun 2024 17:39)    Primary diagnosis of Alcohol dependence with withdrawal    Today is hospital day 3d. This morning patient was seen and examined at bedside, resting comfortably in bed.    Patient spiked another fever overnight.     Family communication:  Contact date:  Name of person contacted:  Relationship to patient:  Communication details:  What matters most:    PAST MEDICAL & SURGICAL HISTORY  Throat cancer    HTN (hypertension)    History of alcohol use disorder      SOCIAL HISTORY:  Social History:      ALLERGIES:  No Known Allergies    MEDICATIONS:  STANDING MEDICATIONS  amLODIPine   Tablet 5 milliGRAM(s) Oral daily  cefepime   IVPB 2000 milliGRAM(s) IV Intermittent every 12 hours  cefepime   IVPB      chlorhexidine 4% Liquid 1 Application(s) Topical daily  folic acid 1 milliGRAM(s) Oral daily  heparin   Injectable 5000 Unit(s) SubCutaneous every 8 hours  levoFLOXacin IVPB 750 milliGRAM(s) IV Intermittent every 48 hours  LORazepam     Tablet 2 milliGRAM(s) Oral every 6 hours  nicotine - 21 mG/24Hr(s) Patch 1 Patch Transdermal daily  pantoprazole    Tablet 40 milliGRAM(s) Oral before breakfast  thiamine 100 milliGRAM(s) Oral daily    PRN MEDICATIONS  acetaminophen     Tablet .. 650 milliGRAM(s) Oral every 6 hours PRN  polyethylene glycol 3350 17 Gram(s) Oral two times a day PRN  senna 2 Tablet(s) Oral at bedtime PRN    VITALS:   T(F): 97  HR: 70  BP: 136/74  RR: 18  SpO2: 98%    PHYSICAL EXAM:  Constitutional; No acute distress. Muscle atrophy   Head: Atraumatic, normocephalic  Lungs: bilateral crackles  Heart: S1, S2+; no murmurs  Abd: Soft non tender non distended  Ext: no pedal edema  Neuro: AOX3  Skin: No rashes or lesions    LABS:                        8.8    9.69  )-----------( 201      ( 06 Jun 2024 06:56 )             25.2     06-06    140  |  102  |  21<H>  ----------------------------<  72  3.8   |  25  |  1.8<H>    Ca    8.7      06 Jun 2024 06:56  Mg     1.7     06-06    TPro  4.8<L>  /  Alb  2.7<L>  /  TBili  0.5  /  DBili  x   /  AST  34  /  ALT  40  /  AlkPhos  75  06-06      Urinalysis Basic - ( 06 Jun 2024 06:56 )    Color: x / Appearance: x / SG: x / pH: x  Gluc: 72 mg/dL / Ketone: x  / Bili: x / Urobili: x   Blood: x / Protein: x / Nitrite: x   Leuk Esterase: x / RBC: x / WBC x   Sq Epi: x / Non Sq Epi: x / Bacteria: x      Culture - Blood (collected 03 Jun 2024 12:00)  Source: .Blood None  Preliminary Report (05 Jun 2024 23:11):    No growth at 48 Hours          
SUBJECTIVE:    Patient is a 64y old Male who presents with a chief complaint of Sepsis (07 Jun 2024 10:26)    Currently admitted to medicine with the primary diagnosis of Alcohol dependence with withdrawal       Today is hospital day 4d.     PAST MEDICAL & SURGICAL HISTORY  Throat cancer    HTN (hypertension)    History of alcohol use disorder      ALLERGIES:  No Known Allergies    MEDICATIONS:  STANDING MEDICATIONS  amLODIPine   Tablet 5 milliGRAM(s) Oral daily  cefepime   IVPB 2000 milliGRAM(s) IV Intermittent every 12 hours  cefepime   IVPB      chlorhexidine 4% Liquid 1 Application(s) Topical daily  folic acid 1 milliGRAM(s) Oral daily  heparin   Injectable 5000 Unit(s) SubCutaneous every 8 hours  levoFLOXacin IVPB 750 milliGRAM(s) IV Intermittent every 48 hours  LORazepam     Tablet 1 milliGRAM(s) Oral every 6 hours  nicotine - 21 mG/24Hr(s) Patch 1 Patch Transdermal daily  pantoprazole    Tablet 40 milliGRAM(s) Oral before breakfast  sodium chloride 0.9%. 1000 milliLiter(s) IV Continuous <Continuous>  thiamine 100 milliGRAM(s) Oral daily    PRN MEDICATIONS  acetaminophen     Tablet .. 650 milliGRAM(s) Oral every 6 hours PRN  polyethylene glycol 3350 17 Gram(s) Oral two times a day PRN  senna 2 Tablet(s) Oral at bedtime PRN    VITALS:   T(F): 99.2  HR: 84  BP: 127/65  RR: 18  SpO2: 96%    LABS:                        9.0    8.24  )-----------( 187      ( 07 Jun 2024 07:52 )             27.2     06-07    137  |  104  |  18  ----------------------------<  79  3.8   |  23  |  1.5    Ca    8.3<L>      07 Jun 2024 07:52  Mg     1.8     06-07    TPro  4.8<L>  /  Alb  2.7<L>  /  TBili  0.4  /  DBili  x   /  AST  23  /  ALT  33  /  AlkPhos  66  06-07      Urinalysis Basic - ( 07 Jun 2024 07:52 )    Color: x / Appearance: x / SG: x / pH: x  Gluc: 79 mg/dL / Ketone: x  / Bili: x / Urobili: x   Blood: x / Protein: x / Nitrite: x   Leuk Esterase: x / RBC: x / WBC x   Sq Epi: x / Non Sq Epi: x / Bacteria: x            Culture - Blood (collected 06 Jun 2024 06:56)  Source: .Blood None  Preliminary Report (07 Jun 2024 13:02):    No growth at 24 hours          RADIOLOGY:    PHYSICAL EXAM:  GEN: No acute distress  LUNGS: Clear to auscultation bilaterally   HEART: S1/S2 present. RRR.   ABD/ GI: Soft, non-tender, non-distended. Bowel sounds present  EXT: NC/NC/NE/2+PP/GONZLAEZ  NEURO: AAOX3    
CHIEF COMPLAINT:  Patient is a 64y old  Male who presents with a chief complaint of Sepsis (03 Jun 2024 08:39)      INTERVAL HISTORY/OVERNIGHT EVENTS:  Patient was admitted over night.     ======================  MEDICATIONS:  cefTRIAXone   IVPB 1000 milliGRAM(s) IV Intermittent every 24 hours  chlorhexidine 4% Liquid 1 Application(s) Topical daily  folic acid 1 milliGRAM(s) Oral daily  heparin   Injectable 5000 Unit(s) SubCutaneous every 8 hours  levoFLOXacin IVPB 750 milliGRAM(s) IV Intermittent every 48 hours  LORazepam   Injectable 4 milliGRAM(s) IV Push every 4 hours  LORazepam   Injectable   IV Push   nicotine - 21 mG/24Hr(s) Patch 1 Patch Transdermal daily  pantoprazole    Tablet 40 milliGRAM(s) Oral before breakfast  thiamine 100 milliGRAM(s) Oral daily    DRIPS:  sodium bicarbonate  Infusion 0.25 mEq/kG/Hr (100 mL/Hr) IV Continuous <Continuous>    PRN:       ======================  PHYSICAL EXAMINATION:  GEN:  nad.   HEENT:  eomi. ncat  PULM:  b/l lung sounds   CARD: s1, s2  ABD: +bs. ntnd  EXT:  no new rashes.    NEURO:  no new focal deficits.   ======================  OBJECTIVE:        VS:  T(F): 99.3 (06-03 @ 08:00), Max: 102.2 (06-03 @ 05:00)  HR: 78 (06-03 @ 08:00) (78 - 88)  BP: 99/55 (06-03 @ 08:00) (88/53 - 132/66)  RR: 12 (06-03 @ 08:00) (8 - 19)  SpO2: 98% (06-03 @ 08:00) (84% - 100%)  CVP(mm Hg): --  CO: --  CI: --  PA: --  PCWP: --    I/O:      06-02 @ 07:01  -  06-03 @ 07:00  --------------------------------------------------------  IN: 500 mL / OUT: 400 mL / NET: 100 mL    06-03 @ 07:01  -  06-03 @ 10:30  --------------------------------------------------------  IN: 100 mL / OUT: 290 mL / NET: -190 mL        Weight trend:  Weight (kg): 60 (06-03), 61.2 (05-31)    ======================    LABS:                          9.9    17.76 )-----------( 395      ( 03 Jun 2024 00:30 )             30.1     06-03    139  |  94<L>  |  34<H>  ----------------------------<  96  6.5<HH>   |  17  |  2.7<H>    Ca    9.8      03 Jun 2024 00:30  Phos  8.3     06-03  Mg     2.1     06-03    TPro  6.5  /  Alb  4.1  /  TBili  0.4  /  DBili  x   /  AST  43<H>  /  ALT  60<H>  /  AlkPhos  81  06-03    LIVER FUNCTIONS - ( 03 Jun 2024 00:30 )  Alb: 4.1 g/dL / Pro: 6.5 g/dL / ALK PHOS: 81 U/L / ALT: 60 U/L / AST: 43 U/L / GGT: x           PT/INR - ( 03 Jun 2024 00:30 )   PT: 11.40 sec;   INR: 1.00 ratio         PTT - ( 03 Jun 2024 00:30 )  PTT:34.7 sec          Urinalysis Basic - ( 03 Jun 2024 00:30 )    Color: x / Appearance: x / SG: x / pH: x  Gluc: 96 mg/dL / Ketone: x  / Bili: x / Urobili: x   Blood: x / Protein: x / Nitrite: x   Leuk Esterase: x / RBC: x / WBC x   Sq Epi: x / Non Sq Epi: x / Bacteria: x        Cultures:        
Patient is a 64y old  Male who presents with a chief complaint of Sepsis (03 Jun 2024 10:30)        Over Night Events:  Off pressors.  on RA        ROS:     All ROS are negative except HPI         PHYSICAL EXAM    ICU Vital Signs Last 24 Hrs  T(C): 36.9 (04 Jun 2024 04:00), Max: 37.1 (03 Jun 2024 16:00)  T(F): 98.5 (04 Jun 2024 04:00), Max: 98.7 (03 Jun 2024 16:00)  HR: 85 (04 Jun 2024 07:00) (69 - 88)  BP: 128/60 (04 Jun 2024 07:00) (88/50 - 150/65)  BP(mean): 87 (04 Jun 2024 07:00) (64 - 94)  ABP: --  ABP(mean): --  RR: 23 (04 Jun 2024 07:00) (6 - 31)  SpO2: 98% (04 Jun 2024 07:00) (86% - 99%)    O2 Parameters below as of 04 Jun 2024 04:00  Patient On (Oxygen Delivery Method): nasal cannula  O2 Flow (L/min): 2          CONSTITUTIONAL:   NAD    ENT:   Airway patent,   Mouth with normal mucosa.       EYES:   Pupils equal,   Round and reactive to light.    CARDIAC:   Normal rate,   Regular rhythm.      RESPIRATORY:   No wheezing  Bilateral BS  Normal chest expansion  Not tachypneic,  No use of accessory muscles    GASTROINTESTINAL:  Abdomen soft,   Non-tender,   No guarding,   + BS    MUSCULOSKELETAL:   Range of motion is not limited,  No clubbing, cyanosis    NEUROLOGICAL:   Alert and oriented   No motor  deficits.    SKIN:   Skin normal color for race,   No evidence of rash.      06-03-24 @ 07:01  -  06-04-24 @ 07:00  --------------------------------------------------------  IN:    IV PiggyBack: 100 mL    Lactated Ringers: 100 mL    Oral Fluid: 120 mL    Sodium Bicarbonate: 2300 mL  Total IN: 2620 mL    OUT:    Voided (mL): 2110 mL  Total OUT: 2110 mL    Total NET: 510 mL          LABS:                            7.9    14.83 )-----------( 216      ( 04 Jun 2024 04:30 )             23.4                                               06-04    139  |  96<L>  |  37<H>  ----------------------------<  95  3.7   |  32  |  2.2<H>    Creatinine Trend  BUN 37, Cr 2.2, (06-04-24 @ 04:30)  Creatinine Trend  BUN 39, Cr 2.3, (06-03-24 @ 12:00)  Creatinine Trend  BUN 34, Cr 2.7, (06-03-24 @ 00:30)  Creatinine Trend  BUN 17, Cr 1.5, (05-31-24 @ 00:48)      Ca    8.5      04 Jun 2024 04:30  Phos  3.7     06-04  Mg     2.0     06-04    TPro  4.6<L>  /  Alb  3.1<L>  /  TBili  0.2  /  DBili  x   /  AST  66<H>  /  ALT  47<H>  /  AlkPhos  51  06-03      PT/INR - ( 03 Jun 2024 00:30 )   PT: 11.40 sec;   INR: 1.00 ratio         PTT - ( 03 Jun 2024 00:30 )  PTT:34.7 sec                                       Urinalysis Basic - ( 04 Jun 2024 04:30 )    Color: x / Appearance: x / SG: x / pH: x  Gluc: 95 mg/dL / Ketone: x  / Bili: x / Urobili: x   Blood: x / Protein: x / Nitrite: x   Leuk Esterase: x / RBC: x / WBC x   Sq Epi: x / Non Sq Epi: x / Bacteria: x        CARDIAC MARKERS ( 03 Jun 2024 12:00 )  x     / x     / 1847 U/L / x     / x                                                LIVER FUNCTIONS - ( 03 Jun 2024 12:00 )  Alb: 3.1 g/dL / Pro: 4.6 g/dL / ALK PHOS: 51 U/L / ALT: 47 U/L / AST: 66 U/L / GGT: x                                                                                                                                       MEDICATIONS  (STANDING):  cefTRIAXone   IVPB 1000 milliGRAM(s) IV Intermittent every 24 hours  chlorhexidine 4% Liquid 1 Application(s) Topical daily  folic acid 1 milliGRAM(s) Oral daily  heparin   Injectable 5000 Unit(s) SubCutaneous every 8 hours  levoFLOXacin IVPB 750 milliGRAM(s) IV Intermittent every 48 hours  nicotine - 21 mG/24Hr(s) Patch 1 Patch Transdermal daily  pantoprazole    Tablet 40 milliGRAM(s) Oral before breakfast  sodium bicarbonate  Infusion 0.25 mEq/kG/Hr (100 mL/Hr) IV Continuous <Continuous>  thiamine 100 milliGRAM(s) Oral daily    MEDICATIONS  (PRN):  acetaminophen     Tablet .. 650 milliGRAM(s) Oral every 6 hours PRN Temp greater or equal to 38C (100.4F)  LORazepam   Injectable 2 milliGRAM(s) IV Push every 2 hours PRN Symptom-triggered: each CIWA -Ar score 8 or GREATER  polyethylene glycol 3350 17 Gram(s) Oral two times a day PRN Constipation  senna 2 Tablet(s) Oral at bedtime PRN Constipation      New X-rays reviewed:                                                                                  ECHO      
SUBJECTIVE:    Patient is a 64y old Male who presents with a chief complaint of Sepsis (05 Jun 2024 10:12)    Currently admitted to medicine with the primary diagnosis of Alcohol dependence with withdrawal       Today is hospital day 2d.     PAST MEDICAL & SURGICAL HISTORY  Throat cancer    HTN (hypertension)    History of alcohol use disorder      ALLERGIES:  No Known Allergies    MEDICATIONS:  STANDING MEDICATIONS  cefTRIAXone   IVPB 1000 milliGRAM(s) IV Intermittent every 24 hours  chlorhexidine 4% Liquid 1 Application(s) Topical daily  folic acid 1 milliGRAM(s) Oral daily  heparin   Injectable 5000 Unit(s) SubCutaneous every 8 hours  lactated ringers. 1000 milliLiter(s) IV Continuous <Continuous>  levoFLOXacin IVPB 750 milliGRAM(s) IV Intermittent every 48 hours  nicotine - 21 mG/24Hr(s) Patch 1 Patch Transdermal daily  pantoprazole    Tablet 40 milliGRAM(s) Oral before breakfast  thiamine 100 milliGRAM(s) Oral daily    PRN MEDICATIONS  acetaminophen     Tablet .. 650 milliGRAM(s) Oral every 6 hours PRN  LORazepam   Injectable 2 milliGRAM(s) IV Push every 2 hours PRN  polyethylene glycol 3350 17 Gram(s) Oral two times a day PRN  senna 2 Tablet(s) Oral at bedtime PRN    VITALS:   T(F): 98.3  HR: 81  BP: 177/82  RR: 18  SpO2: 99%    LABS:                        8.5    17.62 )-----------( 230      ( 05 Jun 2024 06:56 )             24.5     06-05    139  |  100  |  25<H>  ----------------------------<  78  3.3<L>   |  27  |  1.8<H>    Ca    8.4      05 Jun 2024 06:56  Phos  3.7     06-04  Mg     1.6     06-05    TPro  4.6<L>  /  Alb  2.8<L>  /  TBili  0.4  /  DBili  x   /  AST  53<H>  /  ALT  47<H>  /  AlkPhos  80  06-05      Urinalysis Basic - ( 05 Jun 2024 06:56 )    Color: x / Appearance: x / SG: x / pH: x  Gluc: 78 mg/dL / Ketone: x  / Bili: x / Urobili: x   Blood: x / Protein: x / Nitrite: x   Leuk Esterase: x / RBC: x / WBC x   Sq Epi: x / Non Sq Epi: x / Bacteria: x            Culture - Blood (collected 03 Jun 2024 12:00)  Source: .Blood None  Preliminary Report (05 Jun 2024 01:03):    No growth at 24 hours    Culture - Blood (collected 03 Jun 2024 00:30)  Source: .Blood Blood-Peripheral  Preliminary Report (05 Jun 2024 10:02):    No growth at 48 Hours    Culture - Blood (collected 03 Jun 2024 00:30)  Source: .Blood Blood-Peripheral  Preliminary Report (05 Jun 2024 10:02):    No growth at 48 Hours          RADIOLOGY:    PHYSICAL EXAM:  GEN: No acute distress  LUNGS: Clear to auscultation bilaterally   HEART: S1/S2 present. RRR.   ABD/ GI: Soft, non-tender, non-distended. Bowel sounds present  EXT: NC/NC/NE/2+PP/GONZALEZ  NEURO: Awake and alert but confused    
SUBJECTIVE:    Patient is a 64y old Male who presents with a chief complaint of Sepsis (08 Jun 2024 08:03)    Currently admitted to medicine with the primary diagnosis of Pneumonia       Today is hospital day 5d.     PAST MEDICAL & SURGICAL HISTORY  Throat cancer    HTN (hypertension)    History of alcohol use disorder      ALLERGIES:  No Known Allergies    MEDICATIONS:  STANDING MEDICATIONS  amLODIPine   Tablet 5 milliGRAM(s) Oral daily  cefepime   IVPB      cefepime   IVPB 2000 milliGRAM(s) IV Intermittent every 12 hours  chlorhexidine 4% Liquid 1 Application(s) Topical daily  folic acid 1 milliGRAM(s) Oral daily  heparin   Injectable 5000 Unit(s) SubCutaneous every 8 hours  levoFLOXacin IVPB 750 milliGRAM(s) IV Intermittent every 48 hours  LORazepam     Tablet 0.5 milliGRAM(s) Oral every 8 hours  magnesium sulfate  IVPB 2 Gram(s) IV Intermittent once  nicotine - 21 mG/24Hr(s) Patch 1 Patch Transdermal daily  pantoprazole    Tablet 40 milliGRAM(s) Oral before breakfast  thiamine 100 milliGRAM(s) Oral daily    PRN MEDICATIONS  acetaminophen     Tablet .. 650 milliGRAM(s) Oral every 6 hours PRN  polyethylene glycol 3350 17 Gram(s) Oral two times a day PRN  senna 2 Tablet(s) Oral at bedtime PRN    VITALS:   T(F): 98.2  HR: 74  BP: 122/65  RR: 21  SpO2: 98%    LABS:                        9.1    8.12  )-----------( 209      ( 08 Jun 2024 07:05 )             26.8     06-08    136  |  103  |  19  ----------------------------<  87  3.9   |  23  |  1.5    Ca    8.3<L>      08 Jun 2024 07:05  Mg     1.6     06-08    TPro  5.1<L>  /  Alb  2.7<L>  /  TBili  0.4  /  DBili  x   /  AST  23  /  ALT  32  /  AlkPhos  64  06-08      Urinalysis Basic - ( 08 Jun 2024 07:05 )    Color: x / Appearance: x / SG: x / pH: x  Gluc: 87 mg/dL / Ketone: x  / Bili: x / Urobili: x   Blood: x / Protein: x / Nitrite: x   Leuk Esterase: x / RBC: x / WBC x   Sq Epi: x / Non Sq Epi: x / Bacteria: x            Culture - Blood (collected 06 Jun 2024 06:56)  Source: .Blood None  Preliminary Report (07 Jun 2024 13:02):    No growth at 24 hours          RADIOLOGY:    PHYSICAL EXAM:  GEN: No acute distress  LUNGS: Clear to auscultation bilaterally   HEART: S1/S2 present. RRR.   ABD/ GI: Soft, non-tender, non-distended. Bowel sounds present  EXT: NC/NC/NE/2+PP/GONZALEZ  NEURO: AAOX3    
24H events:    Patient is a 64y old Male who presents with a chief complaint of Sepsis (06 Jun 2024 15:41)    Primary diagnosis of Alcohol dependence with withdrawal    Today is hospital day 4d. This morning patient was seen and examined at bedside, resting comfortably in bed.    No acute or major events overnight.    Family communication:  Contact date:  Name of person contacted:  Relationship to patient:  Communication details:  What matters most:    PAST MEDICAL & SURGICAL HISTORY  Throat cancer    HTN (hypertension)    History of alcohol use disorder      SOCIAL HISTORY:  Social History:      ALLERGIES:  No Known Allergies    MEDICATIONS:  STANDING MEDICATIONS  amLODIPine   Tablet 5 milliGRAM(s) Oral daily  cefepime   IVPB 2000 milliGRAM(s) IV Intermittent every 12 hours  cefepime   IVPB      chlorhexidine 4% Liquid 1 Application(s) Topical daily  folic acid 1 milliGRAM(s) Oral daily  heparin   Injectable 5000 Unit(s) SubCutaneous every 8 hours  levoFLOXacin IVPB 750 milliGRAM(s) IV Intermittent every 48 hours  LORazepam     Tablet 1 milliGRAM(s) Oral every 6 hours  nicotine - 21 mG/24Hr(s) Patch 1 Patch Transdermal daily  pantoprazole    Tablet 40 milliGRAM(s) Oral before breakfast  sodium chloride 0.9%. 1000 milliLiter(s) IV Continuous <Continuous>  thiamine 100 milliGRAM(s) Oral daily    PRN MEDICATIONS  acetaminophen     Tablet .. 650 milliGRAM(s) Oral every 6 hours PRN  polyethylene glycol 3350 17 Gram(s) Oral two times a day PRN  senna 2 Tablet(s) Oral at bedtime PRN    VITALS:   T(F): 99.2  HR: 84  BP: 127/65  RR: 18  SpO2: 96%    PHYSICAL EXAM:  Constitutional; No acute distress. Muscle atrophy   Head: Atraumatic, normocephalic  Lungs: bilateral crackles  Heart: S1, S2+; no murmurs  Abd: Soft non tender non distended  Ext: no pedal edema  Neuro: AOX3  Skin: No rashes or lesions    LABS:                        9.0    8.24  )-----------( 187      ( 07 Jun 2024 07:52 )             27.2     06-07    137  |  104  |  18  ----------------------------<  79  3.8   |  23  |  1.5    Ca    8.3<L>      07 Jun 2024 07:52  Mg     1.8     06-07    TPro  4.8<L>  /  Alb  2.7<L>  /  TBili  0.4  /  DBili  x   /  AST  23  /  ALT  33  /  AlkPhos  66  06-07      Urinalysis Basic - ( 07 Jun 2024 07:52 )    Color: x / Appearance: x / SG: x / pH: x  Gluc: 79 mg/dL / Ketone: x  / Bili: x / Urobili: x   Blood: x / Protein: x / Nitrite: x   Leuk Esterase: x / RBC: x / WBC x   Sq Epi: x / Non Sq Epi: x / Bacteria: x            
24H events:    Patient is a 64y old Male who presents with a chief complaint of Sepsis (07 Jun 2024 15:37)    Primary diagnosis of Alcohol dependence with withdrawal    Today is hospital day 5d. This morning patient was seen and examined at bedside, resting comfortably in bed.    No acute or major events overnight.    Family communication:  Contact date:  Name of person contacted:  Relationship to patient:  Communication details:  What matters most:    PAST MEDICAL & SURGICAL HISTORY  Throat cancer    HTN (hypertension)    History of alcohol use disorder      SOCIAL HISTORY:  Social History:      ALLERGIES:  No Known Allergies    MEDICATIONS:  STANDING MEDICATIONS  amLODIPine   Tablet 5 milliGRAM(s) Oral daily  cefepime   IVPB      cefepime   IVPB 2000 milliGRAM(s) IV Intermittent every 12 hours  chlorhexidine 4% Liquid 1 Application(s) Topical daily  folic acid 1 milliGRAM(s) Oral daily  heparin   Injectable 5000 Unit(s) SubCutaneous every 8 hours  levoFLOXacin IVPB 750 milliGRAM(s) IV Intermittent every 48 hours  nicotine - 21 mG/24Hr(s) Patch 1 Patch Transdermal daily  pantoprazole    Tablet 40 milliGRAM(s) Oral before breakfast  thiamine 100 milliGRAM(s) Oral daily    PRN MEDICATIONS  acetaminophen     Tablet .. 650 milliGRAM(s) Oral every 6 hours PRN  polyethylene glycol 3350 17 Gram(s) Oral two times a day PRN  senna 2 Tablet(s) Oral at bedtime PRN    VITALS:   T(F): 98.6  HR: 80  BP: 121/66  RR: 18  SpO2: 96%    PHYSICAL EXAM:  Constitutional; No acute distress. Muscle atrophy   Head: Atraumatic, normocephalic  Lungs: bilateral crackles  Heart: S1, S2+; no murmurs  Abd: Soft non tender non distended  Ext: no pedal edema  Neuro: AOX3  Skin: No rashes or lesions    LABS:                        9.0    8.24  )-----------( 187      ( 07 Jun 2024 07:52 )             27.2     06-07    137  |  104  |  18  ----------------------------<  79  3.8   |  23  |  1.5    Ca    8.3<L>      07 Jun 2024 07:52  Mg     1.8     06-07    TPro  4.8<L>  /  Alb  2.7<L>  /  TBili  0.4  /  DBili  x   /  AST  23  /  ALT  33  /  AlkPhos  66  06-07      Urinalysis Basic - ( 07 Jun 2024 07:52 )    Color: x / Appearance: x / SG: x / pH: x  Gluc: 79 mg/dL / Ketone: x  / Bili: x / Urobili: x   Blood: x / Protein: x / Nitrite: x   Leuk Esterase: x / RBC: x / WBC x   Sq Epi: x / Non Sq Epi: x / Bacteria: x      Culture - Blood (collected 06 Jun 2024 06:56)  Source: .Blood None  Preliminary Report (07 Jun 2024 13:02):    No growth at 24 hours            
CHIEF COMPLAINT:  Patient is a 64y old  Male who presents with a chief complaint of Sepsis (03 Jun 2024 08:39)      INTERVAL HISTORY/OVERNIGHT EVENTS:  Patient was admitted over night.     ======================  MEDICATIONS:  cefTRIAXone   IVPB 1000 milliGRAM(s) IV Intermittent every 24 hours  chlorhexidine 4% Liquid 1 Application(s) Topical daily  folic acid 1 milliGRAM(s) Oral daily  heparin   Injectable 5000 Unit(s) SubCutaneous every 8 hours  levoFLOXacin IVPB 750 milliGRAM(s) IV Intermittent every 48 hours  LORazepam   Injectable 4 milliGRAM(s) IV Push every 4 hours  LORazepam   Injectable   IV Push   nicotine - 21 mG/24Hr(s) Patch 1 Patch Transdermal daily  pantoprazole    Tablet 40 milliGRAM(s) Oral before breakfast  thiamine 100 milliGRAM(s) Oral daily    DRIPS:  sodium bicarbonate  Infusion 0.25 mEq/kG/Hr (100 mL/Hr) IV Continuous <Continuous>    PRN:       ======================  PHYSICAL EXAMINATION:  GEN:  nad.   HEENT:  eomi. ncat  PULM:  b/l lung sounds   CARD: s1, s2  ABD: +bs. ntnd  EXT:  no new rashes.    NEURO:  no new focal deficits.   ======================  OBJECTIVE:        VS:  T(F): 99.3 (06-03 @ 08:00), Max: 102.2 (06-03 @ 05:00)  HR: 78 (06-03 @ 08:00) (78 - 88)  BP: 99/55 (06-03 @ 08:00) (88/53 - 132/66)  RR: 12 (06-03 @ 08:00) (8 - 19)  SpO2: 98% (06-03 @ 08:00) (84% - 100%)  CVP(mm Hg): --  CO: --  CI: --  PA: --  PCWP: --    I/O:      06-02 @ 07:01  -  06-03 @ 07:00  --------------------------------------------------------  IN: 500 mL / OUT: 400 mL / NET: 100 mL    06-03 @ 07:01  -  06-03 @ 10:30  --------------------------------------------------------  IN: 100 mL / OUT: 290 mL / NET: -190 mL        Weight trend:  Weight (kg): 60 (06-03), 61.2 (05-31)    ======================    LABS:                          9.9    17.76 )-----------( 395      ( 03 Jun 2024 00:30 )             30.1     06-03    139  |  94<L>  |  34<H>  ----------------------------<  96  6.5<HH>   |  17  |  2.7<H>    Ca    9.8      03 Jun 2024 00:30  Phos  8.3     06-03  Mg     2.1     06-03    TPro  6.5  /  Alb  4.1  /  TBili  0.4  /  DBili  x   /  AST  43<H>  /  ALT  60<H>  /  AlkPhos  81  06-03    LIVER FUNCTIONS - ( 03 Jun 2024 00:30 )  Alb: 4.1 g/dL / Pro: 6.5 g/dL / ALK PHOS: 81 U/L / ALT: 60 U/L / AST: 43 U/L / GGT: x           PT/INR - ( 03 Jun 2024 00:30 )   PT: 11.40 sec;   INR: 1.00 ratio         PTT - ( 03 Jun 2024 00:30 )  PTT:34.7 sec          Urinalysis Basic - ( 03 Jun 2024 00:30 )    Color: x / Appearance: x / SG: x / pH: x  Gluc: 96 mg/dL / Ketone: x  / Bili: x / Urobili: x   Blood: x / Protein: x / Nitrite: x   Leuk Esterase: x / RBC: x / WBC x   Sq Epi: x / Non Sq Epi: x / Bacteria: x        Cultures:        
SUBJECTIVE:    Patient is a 64y old Male who presents with a chief complaint of Sepsis (06 Jun 2024 10:04)    Currently admitted to medicine with the primary diagnosis of Alcohol dependence with withdrawal       Today is hospital day 3d.     PAST MEDICAL & SURGICAL HISTORY  Throat cancer    HTN (hypertension)    History of alcohol use disorder      ALLERGIES:  No Known Allergies    MEDICATIONS:  STANDING MEDICATIONS  amLODIPine   Tablet 5 milliGRAM(s) Oral daily  cefepime   IVPB      cefepime   IVPB 2000 milliGRAM(s) IV Intermittent every 12 hours  chlorhexidine 4% Liquid 1 Application(s) Topical daily  folic acid 1 milliGRAM(s) Oral daily  heparin   Injectable 5000 Unit(s) SubCutaneous every 8 hours  levoFLOXacin IVPB 750 milliGRAM(s) IV Intermittent every 48 hours  LORazepam     Tablet 2 milliGRAM(s) Oral every 6 hours  nicotine - 21 mG/24Hr(s) Patch 1 Patch Transdermal daily  pantoprazole    Tablet 40 milliGRAM(s) Oral before breakfast  thiamine 100 milliGRAM(s) Oral daily    PRN MEDICATIONS  acetaminophen     Tablet .. 650 milliGRAM(s) Oral every 6 hours PRN  polyethylene glycol 3350 17 Gram(s) Oral two times a day PRN  senna 2 Tablet(s) Oral at bedtime PRN    VITALS:   T(F): 97  HR: 75  BP: 147/71  RR: 18  SpO2: 94%    LABS:                        8.8    9.69  )-----------( 201      ( 06 Jun 2024 06:56 )             25.2     06-06    140  |  102  |  21<H>  ----------------------------<  72  3.8   |  25  |  1.8<H>    Ca    8.7      06 Jun 2024 06:56  Mg     1.7     06-06    TPro  4.8<L>  /  Alb  2.7<L>  /  TBili  0.5  /  DBili  x   /  AST  34  /  ALT  40  /  AlkPhos  75  06-06      Urinalysis Basic - ( 06 Jun 2024 06:56 )    Color: x / Appearance: x / SG: x / pH: x  Gluc: 72 mg/dL / Ketone: x  / Bili: x / Urobili: x   Blood: x / Protein: x / Nitrite: x   Leuk Esterase: x / RBC: x / WBC x   Sq Epi: x / Non Sq Epi: x / Bacteria: x                RADIOLOGY:    PHYSICAL EXAM:  GEN: No acute distress  LUNGS: Clear to auscultation bilaterally   HEART: S1/S2 present. RRR.   ABD/ GI: Soft, non-tender, non-distended. Bowel sounds present  EXT: NC/NC/NE/2+PP/GONZALEZ  NEURO: AAOX3

## 2024-06-08 NOTE — DISCHARGE NOTE NURSING/CASE MANAGEMENT/SOCIAL WORK - NSDCPEEMAIL_GEN_ALL_CORE
[facility] email [email or web address]

## 2024-06-10 LAB — DRUG SCREEN, SERUM: ABNORMAL

## 2024-06-11 LAB
CULTURE RESULTS: SIGNIFICANT CHANGE UP
SPECIMEN SOURCE: SIGNIFICANT CHANGE UP

## 2024-06-14 DIAGNOSIS — Z11.52 ENCOUNTER FOR SCREENING FOR COVID-19: ICD-10-CM

## 2024-06-14 DIAGNOSIS — F10.239 ALCOHOL DEPENDENCE WITH WITHDRAWAL, UNSPECIFIED: ICD-10-CM

## 2024-06-14 DIAGNOSIS — E87.29 OTHER ACIDOSIS: ICD-10-CM

## 2024-06-14 DIAGNOSIS — N18.30 CHRONIC KIDNEY DISEASE, STAGE 3 UNSPECIFIED: ICD-10-CM

## 2024-06-14 DIAGNOSIS — J69.0 PNEUMONITIS DUE TO INHALATION OF FOOD AND VOMIT: ICD-10-CM

## 2024-06-14 DIAGNOSIS — N17.9 ACUTE KIDNEY FAILURE, UNSPECIFIED: ICD-10-CM

## 2024-06-14 DIAGNOSIS — E87.5 HYPERKALEMIA: ICD-10-CM

## 2024-06-14 DIAGNOSIS — K29.60 OTHER GASTRITIS WITHOUT BLEEDING: ICD-10-CM

## 2024-06-14 DIAGNOSIS — Z92.3 PERSONAL HISTORY OF IRRADIATION: ICD-10-CM

## 2024-06-14 DIAGNOSIS — Z92.21 PERSONAL HISTORY OF ANTINEOPLASTIC CHEMOTHERAPY: ICD-10-CM

## 2024-06-14 DIAGNOSIS — R09.02 HYPOXEMIA: ICD-10-CM

## 2024-06-14 DIAGNOSIS — Z85.89 PERSONAL HISTORY OF MALIGNANT NEOPLASM OF OTHER ORGANS AND SYSTEMS: ICD-10-CM

## 2024-06-14 DIAGNOSIS — R55 SYNCOPE AND COLLAPSE: ICD-10-CM

## 2024-06-14 DIAGNOSIS — J44.9 CHRONIC OBSTRUCTIVE PULMONARY DISEASE, UNSPECIFIED: ICD-10-CM

## 2024-06-14 DIAGNOSIS — F17.210 NICOTINE DEPENDENCE, CIGARETTES, UNCOMPLICATED: ICD-10-CM

## 2024-06-14 DIAGNOSIS — D64.89 OTHER SPECIFIED ANEMIAS: ICD-10-CM

## 2024-06-14 DIAGNOSIS — R45.1 RESTLESSNESS AND AGITATION: ICD-10-CM

## 2024-06-14 DIAGNOSIS — I12.9 HYPERTENSIVE CHRONIC KIDNEY DISEASE WITH STAGE 1 THROUGH STAGE 4 CHRONIC KIDNEY DISEASE, OR UNSPECIFIED CHRONIC KIDNEY DISEASE: ICD-10-CM

## 2024-06-14 DIAGNOSIS — A41.9 SEPSIS, UNSPECIFIED ORGANISM: ICD-10-CM

## 2024-06-14 DIAGNOSIS — K64.0 FIRST DEGREE HEMORRHOIDS: ICD-10-CM

## 2024-06-14 DIAGNOSIS — E83.42 HYPOMAGNESEMIA: ICD-10-CM

## 2024-06-14 DIAGNOSIS — Z79.899 OTHER LONG TERM (CURRENT) DRUG THERAPY: ICD-10-CM

## 2024-06-14 DIAGNOSIS — R79.89 OTHER SPECIFIED ABNORMAL FINDINGS OF BLOOD CHEMISTRY: ICD-10-CM

## 2024-06-18 LAB — DRUG SCREEN, SERUM: ABNORMAL

## 2025-03-24 NOTE — ED ADULT NURSE NOTE - CHIEF COMPLAINT QUOTE
Patient is calling back because the pharmacy stated that doctor's office stated the medication was not necessary. Per patient she has been on this medication for a long time and she does not understand why it is not necessary. Patient would like a call back. Thank you in advance.   BIBEMS from home. pt had syncopal episode. on scene RA:84% BP:75/50. FS:143. family caught the pt, lowered to the floor. denies trauma as per ems given 300ml fluid bolus